# Patient Record
Sex: FEMALE
[De-identification: names, ages, dates, MRNs, and addresses within clinical notes are randomized per-mention and may not be internally consistent; named-entity substitution may affect disease eponyms.]

---

## 2018-05-30 ENCOUNTER — HOSPITAL ENCOUNTER (EMERGENCY)
Dept: HOSPITAL 97 - ER | Age: 23
Discharge: HOME | End: 2018-05-30
Payer: SELF-PAY

## 2018-05-30 VITALS — TEMPERATURE: 98.1 F

## 2018-05-30 VITALS — OXYGEN SATURATION: 98 % | SYSTOLIC BLOOD PRESSURE: 126 MMHG | DIASTOLIC BLOOD PRESSURE: 87 MMHG

## 2018-05-30 DIAGNOSIS — M62.838: ICD-10-CM

## 2018-05-30 DIAGNOSIS — N39.0: Primary | ICD-10-CM

## 2018-05-30 LAB
BUN BLD-MCNC: 15 MG/DL (ref 6–20)
GLUCOSE SERPLBLD-MCNC: 95 MG/DL (ref 65–120)
HCT VFR BLD CALC: 43.1 % (ref 36–45)
LYMPHOCYTES # SPEC AUTO: 1.4 K/UL (ref 0.7–4.9)
MCH RBC QN AUTO: 30.1 PG (ref 27–35)
MCV RBC: 89.2 FL (ref 80–100)
PMV BLD: 10.3 FL (ref 7.6–11.3)
POTASSIUM SERPL-SCNC: 3.8 MEQ/L (ref 3.6–5)
RBC # BLD: 4.83 M/UL (ref 3.86–4.86)

## 2018-05-30 PROCEDURE — 81025 URINE PREGNANCY TEST: CPT

## 2018-05-30 PROCEDURE — 96361 HYDRATE IV INFUSION ADD-ON: CPT

## 2018-05-30 PROCEDURE — 70450 CT HEAD/BRAIN W/O DYE: CPT

## 2018-05-30 PROCEDURE — 80048 BASIC METABOLIC PNL TOTAL CA: CPT

## 2018-05-30 PROCEDURE — 99284 EMERGENCY DEPT VISIT MOD MDM: CPT

## 2018-05-30 PROCEDURE — 81003 URINALYSIS AUTO W/O SCOPE: CPT

## 2018-05-30 PROCEDURE — 36415 COLL VENOUS BLD VENIPUNCTURE: CPT

## 2018-05-30 PROCEDURE — 96375 TX/PRO/DX INJ NEW DRUG ADDON: CPT

## 2018-05-30 PROCEDURE — 96374 THER/PROPH/DIAG INJ IV PUSH: CPT

## 2018-05-30 PROCEDURE — 72125 CT NECK SPINE W/O DYE: CPT

## 2018-05-30 PROCEDURE — 85025 COMPLETE CBC W/AUTO DIFF WBC: CPT
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
## 2018-08-20 ENCOUNTER — HOSPITAL ENCOUNTER (EMERGENCY)
Dept: HOSPITAL 97 - ER | Age: 23
Discharge: HOME | End: 2018-08-20
Payer: SELF-PAY

## 2018-08-20 VITALS — SYSTOLIC BLOOD PRESSURE: 112 MMHG | OXYGEN SATURATION: 98 % | DIASTOLIC BLOOD PRESSURE: 60 MMHG

## 2018-08-20 VITALS — TEMPERATURE: 97.8 F

## 2018-08-20 DIAGNOSIS — O23.11: Primary | ICD-10-CM

## 2018-08-20 LAB
ALBUMIN SERPL BCP-MCNC: 3.9 G/DL (ref 3.4–5)
ALP SERPL-CCNC: 83 U/L (ref 45–117)
ALT SERPL W P-5'-P-CCNC: 16 U/L (ref 12–78)
AST SERPL W P-5'-P-CCNC: 18 U/L (ref 15–37)
BUN BLD-MCNC: 10 MG/DL (ref 7–18)
GLUCOSE SERPLBLD-MCNC: 81 MG/DL (ref 74–106)
HCG SERPL-ACNC: 4613 MIU/ML (ref 1–3)
HCT VFR BLD CALC: 40.2 % (ref 36–45)
LIPASE SERPL-CCNC: 121 U/L (ref 73–393)
LYMPHOCYTES # SPEC AUTO: 1.6 K/UL (ref 0.7–4.9)
MCH RBC QN AUTO: 30.2 PG (ref 27–35)
MCV RBC: 90.7 FL (ref 80–100)
PMV BLD: 10.8 FL (ref 7.6–11.3)
POTASSIUM SERPL-SCNC: 3.7 MMOL/L (ref 3.5–5.1)
RBC # BLD: 4.43 M/UL (ref 3.86–4.86)

## 2018-08-20 PROCEDURE — 87077 CULTURE AEROBIC IDENTIFY: CPT

## 2018-08-20 PROCEDURE — 86901 BLOOD TYPING SEROLOGIC RH(D): CPT

## 2018-08-20 PROCEDURE — 83690 ASSAY OF LIPASE: CPT

## 2018-08-20 PROCEDURE — 76817 TRANSVAGINAL US OBSTETRIC: CPT

## 2018-08-20 PROCEDURE — 80076 HEPATIC FUNCTION PANEL: CPT

## 2018-08-20 PROCEDURE — 84702 CHORIONIC GONADOTROPIN TEST: CPT

## 2018-08-20 PROCEDURE — 86900 BLOOD TYPING SEROLOGIC ABO: CPT

## 2018-08-20 PROCEDURE — 80048 BASIC METABOLIC PNL TOTAL CA: CPT

## 2018-08-20 PROCEDURE — 85025 COMPLETE CBC W/AUTO DIFF WBC: CPT

## 2018-08-20 PROCEDURE — 36415 COLL VENOUS BLD VENIPUNCTURE: CPT

## 2018-08-20 PROCEDURE — 96360 HYDRATION IV INFUSION INIT: CPT

## 2018-08-20 PROCEDURE — 87186 SC STD MICRODIL/AGAR DIL: CPT

## 2018-08-20 PROCEDURE — 87086 URINE CULTURE/COLONY COUNT: CPT

## 2018-08-20 PROCEDURE — 99284 EMERGENCY DEPT VISIT MOD MDM: CPT

## 2018-08-20 PROCEDURE — 87088 URINE BACTERIA CULTURE: CPT

## 2018-08-20 NOTE — RAD REPORT
EXAM DESCRIPTION:  US - Transvaginal OB - 8/20/2018 1:41 pm

 

CLINICAL HISTORY:  ABD CRAMPING, PREGNANT

 

COMPARISON:  OB Complete dated 8/29/2016

 

FINDINGS:  A single gestational sac is seen within the uterus. The shape of the sac is within normal 
limits for gestational age. Based on mean sac diameter of 6 mm, estimated gestational age is 5 weeks 
1 day. No yolk sac or fetal pole is seen.

 

The placenta is not yet developed due to early gestational age.

 

A large right corpus luteal cyst is suspected measuring 5.3 x 5.2 cm. No evidence of ovarian torsion.
 Maternal adnexae are otherwise within normal limits. Normal Doppler blood flow was demonstrated to b
ot ovaries.

 

 

IMPRESSION:  Findings are compatible with early IUP. Embryonic components are not yet visualized, thu
s follow-up sonography would be recommended in 7-10 days.

## 2018-08-20 NOTE — ER
Nurse's Notes                                                                                     

 [facility]                                                                

Name: Temi Cartagena                                                                             

Age: 23 yrs                                                                                       

Sex: Female                                                                                       

: 1995                                                                                   

MRN: [id number]                                                                                   

Arrival Date: 2018                                                                          

Time: 12:29                                                                                       

Account#: [id number]                                                                            

Bed 8                                                                                             

Private MD: None, None                                                                            

Diagnosis: Pregnancy related conditions, unspecified, first trimester;Cystitis                    

                                                                                                  

Presentation:                                                                                     

                                                                                             

12:38 Presenting complaint: Patient states: PAIN TO EPIGASTRIC, SHER SIDES OF ABDOMEN, SHER     ch  

      "OVARIES" ALTERNATING, PINCHING TO "SIDE OF VAGINA" R SIDE.                                 

12:39 Transition of care: patient was not received from another setting of care. Onset of     ch  

      symptoms was 2018. Risk Assessment: Do you want to hurt yourself or someone      

      else? Patient reports no desire to harm self or others. Initial Sepsis Screen: Does the     

      patient meet any 2 criteria? No. Patient's initial sepsis screen is negative. Does the      

      patient have a suspected source of infection? No. Patient's initial sepsis screen is        

      negative. Care prior to arrival: None.                                                      

12:39 Method Of Arrival: Ambulatory                                                             

12:39 Acuity: CLAUDIO 3                                                                           ch  

                                                                                                  

Triage Assessment:                                                                                

12:40 General: Appears in no apparent distress. comfortable, Behavior is calm, cooperative,   ch  

      appropriate for age. Pain: Complains of pain in epigastric area, anterior aspect of         

      right lateral abdomen, anterior aspect of left lateral abdomen, right upper quadrant,       

      left upper quadrant and pelvis Pain currently is 0 out of 10 on a pain scale. at worst      

      was 8 out of 10 on a pain scale. Neuro: No deficits noted.                                  

                                                                                                  

OB/GYN:                                                                                           

12:40 LMP 2018                                                                            ch  

13:38  2, Full Term 1, Premature 0,  0, Living 1                               frederick 

                                                                                                  

Historical:                                                                                       

- Allergies:                                                                                      

12:40 No Known Allergies;                                                                     ch  

- Home Meds:                                                                                      

12:40 None [Active];                                                                          ch  

- PMHx:                                                                                           

12:40 Anxiety;                                                                                ch  

- PSHx:                                                                                           

12:40 None;                                                                                   ch  

                                                                                                  

- Immunization history:: Adult Immunizations up to date, Flu vaccine is not up to date.           

- Social history:: Smoking status: Patient/guardian denies using tobacco,                         

  Patient/guardian denies using alcohol, street drugs.                                            

- Ebola Screening: : Patient negative for fever greater than or equal to 101.5 degrees            

  Fahrenheit, and additional compatible Ebola Virus Disease symptoms Patient denies               

  exposure to infectious person Patient denies travel to an Ebola-affected area in the            

  21 days before illness onset No symptoms or risks identified at this time.                      

- Family history:: not pertinent.                                                                 

                                                                                                  

                                                                                                  

Screenin:06 Abuse screen: Denies threats or abuse. Nutritional screening: No deficits noted.        tw2 

      Tuberculosis screening: No symptoms or risk factors identified. Fall Risk None              

      identified.                                                                                 

                                                                                                  

Assessment:                                                                                       

13:04 Reassessment: pt instructed NPO at this time. General: Appears in no apparent distress. tw2 

      distressed, comfortable. Pain: Complains of pain in pelvis and left upper quadrant and      

      right upper quadrant and epigastric area. Neuro: Level of Consciousness is awake,           

      alert, obeys commands, Oriented to person, place, time, situation. Cardiovascular:          

      Denies chest pain, shortness of breath, Heart tones S1 S2 Patient's skin is warm and        

      dry. Respiratory: Airway is patent Respiratory effort is even, unlabored, Respiratory       

      pattern is regular, symmetrical. GI: Abdomen is flat, Bowel sounds present X 4 quads.       

      Reports lower abdominal pain, upper abdominal pain, nausea. : Reports white vaginal       

      discharge at times. EENT: No signs and/or symptoms were reported regarding the EENT         

      system. Derm: No signs and/or symptoms reported regarding the dermatologic system.          

      Musculoskeletal: Range of motion: intact in all extremities.                                

14:10 Reassessment: Patient appears in no apparent distress at this time. No changes from     tw2 

      previously documented assessment. Patient and/or family updated on plan of care and         

      expected duration. Pain level reassessed. Patient is alert, oriented x 3, equal             

      unlabored respirations, skin warm/dry/pink.                                                 

15:20 Reassessment: Patient appears in no apparent distress at this time. No changes from     tw2 

      previously documented assessment. Patient and/or family updated on plan of care and         

      expected duration. Pain level reassessed. Patient is alert, oriented x 3, equal             

      unlabored respirations, skin warm/dry/pink.                                                 

16:23 Reassessment: Patient appears in no apparent distress at this time. No changes from     tw2 

      previously documented assessment. Patient and/or family updated on plan of care and         

      expected duration. Pain level reassessed. Patient is alert, oriented x 3, equal             

      unlabored respirations, skin warm/dry/pink.                                                 

17:55 Reassessment: Patient appears in no apparent distress at this time. No changes from     tw2 

      previously documented assessment. Patient and/or family updated on plan of care and         

      expected duration. Pain level reassessed. Patient is alert, oriented x 3, equal             

      unlabored respirations, skin warm/dry/pink.                                                 

18:55 Reassessment: Patient appears in no apparent distress at this time. No changes from     tw2 

      previously documented assessment. Patient and/or family updated on plan of care and         

      expected duration. Pain level reassessed. Patient is alert, oriented x 3, equal             

      unlabored respirations, skin warm/dry/pink.                                                 

                                                                                                  

Vital Signs:                                                                                      

12:40  / 83; Pulse 72; Resp 18; Temp 97.8; Pulse Ox 99% on R/A; Weight 95.25 kg; Height ch  

      5 ft. 5 in. (165.10 cm); Pain 0/10;                                                         

14:23  / 53; Pulse 69; Resp 18; Pulse Ox 99% ;                                          sv  

15:20  / 60; Pulse 81; Resp 16; Pulse Ox 100% on R/A;                                   tw2 

16:22  / 68; Pulse 77; Resp 17; Pulse Ox 100% on R/A;                                   tw2 

17:00  / 64; Pulse 83; Resp 18; Pulse Ox 99% ;                                          sv  

18:40  / 60; Pulse 80; Resp 16; Pulse Ox 98% ;                                          sv  

12:40 Body Mass Index 34.95 (95.25 kg, 165.10 cm)                                               

                                                                                                  

ED Course:                                                                                        

12:29 Patient arrived in ED.                                                                  sb2 

12:30 None, None is Private Physician.                                                        sb2 

12:39 Triage completed.                                                                       ch  

12:40 Arm band placed on left wrist. Patient placed in waiting room.                          ch  

12:59 Rowan Arguello, RN is Primary Nurse.                                                        tw2 

13:06 Bed in low position. Call light in reach. Adult w/ patient. Pulse ox on. NIBP on.       tw2 

13:13 Mayito Coulter MD is Attending Physician.                                             frederick 

13:17 Radiology exam delayed due to lab results not completed at this time. (HCG).            lc3 

13:24 Inserted saline lock: 22 gauge in right antecubital area, using aseptic technique.      tw2 

      Blood collected.                                                                            

13:40 US Transvaginal Ob In Process Unspecified.                                              EDMS

16:52 Parth Archuleta MD is Referral Physician.                                           frederick 

17:11 Awaiting lab results, Awaiting: prior to discharge.                                     tw2 

17:45 Awaiting lab results, Awaiting: spoke with Julianne Kim in lab checking on results on     tw2 

      lipase and lfts, 20 minutes needed for additional results, provider notified.               

18:58 No provider procedures requiring assistance completed. IV discontinued, intact,         tw2 

      bleeding controlled, No redness/swelling at site. Pressure dressing applied.                

                                                                                                  

Administered Medications:                                                                         

13:42 Drug: NS 0.9% 1000 ml Route: IV; Rate: 1 bolus; Site: right antecubital;                tw2 

15:00 Follow up: Response: No adverse reaction; IV Status: Completed infusion; IV Intake:     tw2 

      1000ml                                                                                      

                                                                                                  

                                                                                                  

Intake:                                                                                           

15:00 IV: 1000ml; Total: 1000ml.                                                              tw2 

                                                                                                  

Outcome:                                                                                          

16:52 Discharge ordered by MD. mack 

18:58 Patient left the ED.                                                                    tw2 

18:58 Discharged to home ambulatory.                                                          tw2 

18:58 Condition: stable                                                                           

18:58 Discharge instructions given to patient, Instructed on discharge instructions, follow       

      up and referral plans. medication usage, Demonstrated understanding of instructions,        

      follow-up care, medications, Prescriptions given X 2.                                       

                                                                                                  

Addendum:                                                                                         

2018                                                                                        

     07:45 Addendum: Culture Results: Positive urine culture. No further action required. Bacteria i
w

           sensitive to prescribed antibiotic.                                                    

                                                                                                  

Signatures:                                                                                       

Dispatcher [employer]                           EDDot Gonzalez, RN                  RN   Zhane Taylor RN RN sv Anderson, Corey, MD MD cha Williams, Irene, RN RN iw Cunningham, Laulita lc3 Wise, Tara, RN                          RN   tw2                                                  

Lucille Haider                               sb2                                                  

                                                                                                  

**************************************************************************************************

## 2018-08-20 NOTE — EDPHYS
Physician Documentation                                                                           

 [facility]                                                                

Name: Temi Cratagena                                                                             

Age: 23 yrs                                                                                       

Sex: Female                                                                                       

: 1995                                                                                   

MRN: [id number]                                                                                   

Arrival Date: 2018                                                                          

Time: 12:29                                                                                       

Account#: [id number]                                                                            

Bed 8                                                                                             

Private MD: None, None                                                                            

ED Physician Mayito Coulter                                                                      

HPI:                                                                                              

                                                                                             

13:38 This 23 yrs old  Female presents to ER via Ambulatory with complaints of        frederick 

      PREG,ABD CRAMPING LMP 18.                                                             

13:38 The patient presents with abdominal pain in the upper abdomen, in the lower abdomen.    frederick 

      Onset: The symptoms/episode began/occurred 2 day(s) ago. The patient presents to the        

      emergency department with possible uterine contractions. The estimated gestational age      

      is 8 weeks. Associated signs and symptoms: The patient has no apparent associated signs     

      or symptoms.                                                                                

                                                                                                  

OB/GYN:                                                                                           

12:40 LMP 2018                                                                            ch  

13:38  2, Full Term 1, Premature 0,  0, Living 1                               frederick 

                                                                                                  

Historical:                                                                                       

- Allergies:                                                                                      

12:40 No Known Allergies;                                                                     ch  

- Home Meds:                                                                                      

12:40 None [Active];                                                                          ch  

- PMHx:                                                                                           

12:40 Anxiety;                                                                                ch  

- PSHx:                                                                                           

12:40 None;                                                                                   ch  

                                                                                                  

- Immunization history:: Adult Immunizations up to date, Flu vaccine is not up to date.           

- Social history:: Smoking status: Patient/guardian denies using tobacco,                         

  Patient/guardian denies using alcohol, street drugs.                                            

- Ebola Screening: : Patient negative for fever greater than or equal to 101.5 degrees            

  Fahrenheit, and additional compatible Ebola Virus Disease symptoms Patient denies               

  exposure to infectious person Patient denies travel to an Ebola-affected area in the            

  21 days before illness onset No symptoms or risks identified at this time.                      

- Family history:: not pertinent.                                                                 

                                                                                                  

                                                                                                  

ROS:                                                                                              

13:38 Constitutional: Negative for fever, chills, and weight loss, Eyes: Negative for injury, frederick 

      pain, redness, and discharge, ENT: Negative for injury, pain, and discharge, Neck:          

      Negative for injury, pain, and swelling, Cardiovascular: Negative for chest pain,           

      palpitations, and edema, Respiratory: Negative for shortness of breath, cough,              

      wheezing, and pleuritic chest pain, Abdomen/GI: Negative for abdominal pain, nausea,        

      vomiting, diarrhea, and constipation, Back: Negative for injury and pain, : Negative      

      for injury, bleeding, discharge, and swelling, MS/Extremity: Negative for injury and        

      deformity, Skin: Negative for injury, rash, and discoloration, Neuro: Negative for          

      headache, weakness, numbness, tingling, and seizure, Psych: Negative for depression,        

      anxiety, suicide ideation, homicidal ideation, and hallucinations, Allergy/Immunology:      

      Negative for hives, rash, and allergies, Endocrine: Negative for neck swelling,             

      polydipsia, polyuria, polyphagia, and marked weight changes, Hematologic/Lymphatic:         

      Negative for swollen nodes, abnormal bleeding, and unusual bruising.                        

                                                                                                  

Exam:                                                                                             

13:38 Constitutional:  This is a well developed, well nourished patient who is awake, alert,  frederick 

      and in no acute distress. Head/Face:  Normocephalic, atraumatic. Eyes:  Pupils equal        

      round and reactive to light, extra-ocular motions intact.  Lids and lashes normal.          

      Conjunctiva and sclera are non-icteric and not injected.  Cornea within normal limits.      

      Periorbital areas with no swelling, redness, or edema. ENT:  Nares patent. No nasal         

      discharge, no septal abnormalities noted.  Tympanic membranes are normal and external       

      auditory canals are clear.  Oropharynx with no redness, swelling, or masses, exudates,      

      or evidence of obstruction, uvula midline.  Mucous membranes moist. Neck:  Trachea          

      midline, no thyromegaly or masses palpated, and no cervical lymphadenopathy.  Supple,       

      full range of motion without nuchal rigidity, or vertebral point tenderness.  No            

      Meningismus. Chest/axilla:  Normal chest wall appearance and motion.  Nontender with no     

      deformity.  No lesions are appreciated. Cardiovascular:  Regular rate and rhythm with a     

      normal S1 and S2.  No gallops, murmurs, or rubs.  Normal PMI, no JVD.  No pulse             

      deficits. Respiratory:  Lungs have equal breath sounds bilaterally, clear to                

      auscultation and percussion.  No rales, rhonchi or wheezes noted.  No increased work of     

      breathing, no retractions or nasal flaring. Abdomen/GI:  Soft, non-tender, with normal      

      bowel sounds.  No distension or tympany.  No guarding or rebound.  No evidence of           

      tenderness throughout. Back:  No spinal tenderness.  No costovertebral tenderness.          

      Full range of motion. Skin:  Warm, dry with normal turgor.  Normal color with no            

      rashes, no lesions, and no evidence of cellulitis. MS/ Extremity:  Pulses equal, no         

      cyanosis.  Neurovascular intact.  Full, normal range of motion. Neuro:  Awake and           

      alert, GCS 15, oriented to person, place, time, and situation.  Cranial nerves II-XII       

      grossly intact.  Motor strength 5/5 in all extremities.  Sensory grossly intact.            

      Cerebellar exam normal.  Normal gait. Psych:  Awake, alert, with orientation to person,     

      place and time.  Behavior, mood, and affect are within normal limits.                       

                                                                                                  

Vital Signs:                                                                                      

12:40  / 83; Pulse 72; Resp 18; Temp 97.8; Pulse Ox 99% on R/A; Weight 95.25 kg; Height ch  

      5 ft. 5 in. (165.10 cm); Pain 0/10;                                                         

14:23  / 53; Pulse 69; Resp 18; Pulse Ox 99% ;                                          sv  

15:20  / 60; Pulse 81; Resp 16; Pulse Ox 100% on R/A;                                   tw2 

16:22  / 68; Pulse 77; Resp 17; Pulse Ox 100% on R/A;                                   tw2 

17:00  / 64; Pulse 83; Resp 18; Pulse Ox 99% ;                                          sv  

18:40  / 60; Pulse 80; Resp 16; Pulse Ox 98% ;                                          sv  

12:40 Body Mass Index 34.95 (95.25 kg, 165.10 cm)                                               

                                                                                                  

MDM:                                                                                              

13:13 Patient medically screened.                                                             [facility] 

13:39 Data reviewed: vital signs, nurses notes, lab test result(s), radiologic studies,       frederick 

      ultrasound.                                                                                 

                                                                                                  

                                                                                             

13:14 Order name: Quantitative Hcg; Complete Time: 18:39                                      [facility] 

                                                                                             

13:14 Order name: Abo/rh Typing; Complete Time: 14:53                                         [facility] 

                                                                                             

13:14 Order name: Basic Metabolic Panel; Complete Time: 18:39                                 [facility] 

                                                                                             

13:14 Order name: CBC with Diff; Complete Time: 14:53                                         [facility] 

                                                                                             

13:14 Order name: Urine Culture                                                               [facility] 

                                                                                             

13:37 Order name: LFT's; Complete Time: 18:39                                                 [facility] 

                                                                                             

12:41 Order name: Urine Dipstick-Ancillary (obtain specimen); Complete Time: 13:00              

                                                                                             

12:41 Order name: Urine Pregnancy Test (obtain specimen); Complete Time: 13:00                  

                                                                                             

13:14 Order name: IV Saline Lock; Complete Time: 13:27                                        [facility] 

                                                                                             

13:14 Order name: Labs collected and sent; Complete Time: 13:28                               [facility] 

                                                                                             

13:14 Order name: US Transvaginal Ob; Complete Time: 15:15                                    [facility] 

                                                                                             

13:37 Order name: Lipase; Complete Time: 18:39                                                [facility] 

                                                                                             

16:09 Order name: ABO/RH no charge; Complete Time: 16:22                                      [facility]

                                                                                             

13:14 Order name: NPO; Complete Time: 13:28                                                   [facility] 

                                                                                                  

Administered Medications:                                                                         

13:42 Drug: NS 0.9% 1000 ml Route: IV; Rate: 1 bolus; Site: right antecubital;                tw2 

15:00 Follow up: Response: No adverse reaction; IV Status: Completed infusion; IV Intake:     tw2 

      1000ml                                                                                      

                                                                                                  

                                                                                                  

Disposition:                                                                                      

18 16:52 Discharged to Home. Impression: Pregnancy related conditions, unspecified,         

  first trimester, Cystitis.                                                                      

- Condition is Stable.                                                                            

- Discharge Instructions: Dysuria, First Trimester of Pregnancy, Easy-to-Read, First              

  Trimester of Pregnancy, Pelvic Rest.                                                            

- Prescriptions for Prenatal Vitamin 27- 0.8 mg Oral Tablet - take 1 tablet by ORAL               

  route once daily; 30 tablet. Macrobid 100 mg Oral Capsule - take 1 capsule by ORAL              

  route every 12 hours for 7 days; 14 capsule.                                                    

- Medication Reconciliation Form, Thank You Letter, Antibiotic Education, Prescription            

  Opioid Use, Work release form form.                                                             

- Follow up: Private Physician; When: 2 - 3 days; Reason: Recheck today's complaints,             

  Continuance of care, Re-evaluation by your physician. Follow up: Parth Archuleta;             

  When: 2 - 3 days; Reason: Recheck today's complaints, Re-evaluation by your physician.          

- Problem is new.                                                                                 

- Symptoms have improved.                                                                         

                                                                                                  

                                                                                                  

                                                                                                  

Signatures:                                                                                       

Dispatcher [employer]                           EDDot Gonzalez RN RN ch Anderson, Corey, MD MD cha Wise, Tara RN                          RN   tw2                                                  

                                                                                                  

Corrections: (The following items were deleted from the chart)                                    

18:58 16:52 2018 16:52 Discharged to Home. Impression: Pregnancy related conditions,    tw2 

      unspecified, first trimester; Cystitis. Condition is Stable. Discharge Instructions:        

      Dysuria, First Trimester of Pregnancy, Easy-to-Read, First Trimester of Pregnancy,          

      Pelvic Rest. Prescriptions for Prenatal Vitamin 27-0.8 mg Oral Tablet - take 1 tablet       

      by ORAL route once daily; 30 tablet, Macrobid 100 mg Oral Capsule - take 1 capsule by       

      ORAL route every 12 hours for 7 days; 14 capsule. and Forms are Medication                  

      Reconciliation Form, Thank You Letter, Antibiotic Education, Prescription Opioid Use.       

      Follow up: Private Physician; When: 2 - 3 days; Reason: Recheck today's complaints,         

      Continuance of care, Re-evaluation by your physician. Follow up: Parth Archuleta;         

      When: 2 - 3 days; Reason: Recheck today's complaints, Re-evaluation by your physician.      

      Problem is new. Symptoms have improved. frederick                                                 

                                                                                                  

**************************************************************************************************

## 2019-08-03 ENCOUNTER — HOSPITAL ENCOUNTER (EMERGENCY)
Dept: HOSPITAL 97 - ER | Age: 24
Discharge: HOME | End: 2019-08-03
Payer: COMMERCIAL

## 2019-08-03 VITALS — OXYGEN SATURATION: 99 %

## 2019-08-03 VITALS — DIASTOLIC BLOOD PRESSURE: 68 MMHG | SYSTOLIC BLOOD PRESSURE: 112 MMHG

## 2019-08-03 VITALS — TEMPERATURE: 98.4 F

## 2019-08-03 DIAGNOSIS — N73.9: Primary | ICD-10-CM

## 2019-08-03 LAB
ALBUMIN SERPL BCP-MCNC: 3.8 G/DL (ref 3.4–5)
ALP SERPL-CCNC: 94 U/L (ref 45–117)
ALT SERPL W P-5'-P-CCNC: 18 U/L (ref 12–78)
AST SERPL W P-5'-P-CCNC: 14 U/L (ref 15–37)
BUN BLD-MCNC: 13 MG/DL (ref 7–18)
GLUCOSE SERPLBLD-MCNC: 83 MG/DL (ref 74–106)
HCT VFR BLD CALC: 35 % (ref 36–45)
LIPASE SERPL-CCNC: 151 U/L (ref 73–393)
LYMPHOCYTES # SPEC AUTO: 1.9 K/UL (ref 0.7–4.9)
PMV BLD: 11.4 FL (ref 7.6–11.3)
POTASSIUM SERPL-SCNC: 4.2 MMOL/L (ref 3.5–5.1)
RBC # BLD: 4.29 M/UL (ref 3.86–4.86)

## 2019-08-03 PROCEDURE — 80048 BASIC METABOLIC PNL TOTAL CA: CPT

## 2019-08-03 PROCEDURE — 96375 TX/PRO/DX INJ NEW DRUG ADDON: CPT

## 2019-08-03 PROCEDURE — 87490 CHLMYD TRACH DNA DIR PROBE: CPT

## 2019-08-03 PROCEDURE — 81025 URINE PREGNANCY TEST: CPT

## 2019-08-03 PROCEDURE — 36415 COLL VENOUS BLD VENIPUNCTURE: CPT

## 2019-08-03 PROCEDURE — 96365 THER/PROPH/DIAG IV INF INIT: CPT

## 2019-08-03 PROCEDURE — 76830 TRANSVAGINAL US NON-OB: CPT

## 2019-08-03 PROCEDURE — 96361 HYDRATE IV INFUSION ADD-ON: CPT

## 2019-08-03 PROCEDURE — 99284 EMERGENCY DEPT VISIT MOD MDM: CPT

## 2019-08-03 PROCEDURE — 80076 HEPATIC FUNCTION PANEL: CPT

## 2019-08-03 PROCEDURE — 87590 N.GONORRHOEAE DNA DIR PROB: CPT

## 2019-08-03 PROCEDURE — 87210 SMEAR WET MOUNT SALINE/INK: CPT

## 2019-08-03 PROCEDURE — 85025 COMPLETE CBC W/AUTO DIFF WBC: CPT

## 2019-08-03 PROCEDURE — 81003 URINALYSIS AUTO W/O SCOPE: CPT

## 2019-08-03 PROCEDURE — 83690 ASSAY OF LIPASE: CPT

## 2019-08-03 NOTE — ER
Nurse's Notes                                                                                     

 [facility]                                                                 

Name: Temi Cartagena                                                                             

Age: 24 yrs                                                                                       

Sex: Female                                                                                       

: 1995                                                                                   

MRN: [id number]                                                                                   

Arrival Date: 2019                                                                          

Time: 13:23                                                                                       

Account#: [id number]                                                                            

Bed 20                                                                                            

Private MD:                                                                                       

Diagnosis: Female pelvic inflammatory disease, unspecified                                        

                                                                                                  

Presentation:                                                                                     

                                                                                             

13:25 Presenting complaint: Patient states: im having bad pain on my bladder area,            hj  

      (suprapubic area), it doesn't burn when i pee, its been on and off for a couple of          

      weeks now; denies fever and chills; reports nausea;. Transition of care: patient was        

      not received from another setting of care. Onset of symptoms was 2019. Risk      

      Assessment: Do you want to hurt yourself or someone else? Patient reports no desire to      

      harm self or others. Initial Sepsis Screen: Does the patient meet any 2 criteria? No.       

      Patient's initial sepsis screen is negative. Does the patient have a suspected source       

      of infection? No. Patient's initial sepsis screen is negative. Care prior to arrival:       

      None.                                                                                       

13:25 Method Of Arrival: Ambulatory                                                             

13:25 Acuity: CLAUDIO 3                                                                           hj  

                                                                                                  

OB/GYN:                                                                                           

13:27 LMP 2019                                                                             

                                                                                                  

Historical:                                                                                       

- Allergies:                                                                                      

13:27 No Known Allergies;                                                                       

- PMHx:                                                                                           

13:27 Anxiety;                                                                                  

- PSHx:                                                                                           

13:27 None;                                                                                     

                                                                                                  

                                                                                                  

                                                                                                  

Screenin:41 Abuse screen: Denies threats or abuse. Nutritional screening: No deficits noted.        em  

      Tuberculosis screening: No symptoms or risk factors identified. Fall Risk None              

      identified.                                                                                 

                                                                                                  

Assessment:                                                                                       

13:44 General: Appears in no apparent distress. uncomfortable, Behavior is calm, cooperative, em  

      Denies fever. Pain: Complains of pain in suprapubic area Pain currently is 8 out of 10      

      on a pain scale. Neuro: Level of Consciousness is awake, alert, obeys commands,             

      Oriented to person, place, time, situation. Cardiovascular: Capillary refill < 3            

      seconds Patient's skin is warm and dry. Respiratory: Airway is patent Respiratory           

      effort is even, unlabored, Respiratory pattern is regular, symmetrical. : Reports         

      discharge, Denies burning with urination, vaginal bleeding. Derm: Skin is intact, is        

      healthy with good turgor, Skin is pink, warm \T\ dry. Musculoskeletal: Capillary refill <   

      3 seconds, Range of motion: intact in all extremities.                                      

13:44 GI: Abdomen is flat, Patient currently denies nausea, vomiting.                         em  

14:41 Reassessment: Patient appears in no apparent distress at this time. Patient and/or      em  

      family updated on plan of care and expected duration. Pain level reassessed. Patient is     

      alert, oriented x 3, equal unlabored respirations, skin warm/dry/pink. rates pain 4/10      

      Patient states feeling better. Patient states symptoms have improved.                       

16:33 Reassessment: Patient appears in no apparent distress at this time. Patient and/or      em  

      family updated on plan of care and expected duration. Pain level reassessed. Patient is     

      alert, oriented x 3, equal unlabored respirations, skin warm/dry/pink. Patient denies       

      pain at this time. Patient states feeling better.                                           

                                                                                                  

Vital Signs:                                                                                      

13:27  / 68; Pulse 62; Resp 18; Temp 98.4(O); Pulse Ox 100% on R/A; Weight 86.18 kg;    hj  

      Height 5 ft. 5 in. (165.10 cm); Pain 8/10;                                                  

14:41  / 80; Pulse 71; Resp 18; Pulse Ox 99% on R/A; Pain 4/10;                         em  

16:34  / 68; Pulse 81; Resp 16; Pulse Ox 99% on R/A; Pain 0/10;                         em  

13:27 Body Mass Index 31.62 (86.18 kg, 165.10 cm)                                               

                                                                                                  

ED Course:                                                                                        

13:23 Patient arrived in ED.                                                                  mr  

13:27 Triage completed.                                                                       hj  

13:27 Arm band placed on left wrist.                                                          hj  

13:33 Hernesto Saravia LVN is Primary Nurse.                                                     em  

13:35 Mayito Pena PA is PHCP.                                                                cp  

13:35 Kevin Leo MD is Attending Physician.                                                cp  

13:43 Patient has correct armband on for positive identification. Placed in gown. Bed in low  em  

      position. Call light in reach. Pulse ox on. NIBP on.                                        

14:30 Assist provider with pelvic exam: Set up pelvic tray. Performed by Mayito JACKSON        iw  

      Specimens sent to lab. Patient tolerated well.                                              

16:05 Ultrasound completed. Patient tolerated well. Notified NP/PA page.                      sg3 

16:05 US Transvaginal Study (Probe) In Process Unspecified.                                   EDMS

16:48 IV discontinued, intact, bleeding controlled, No redness/swelling at site. Pressure     em  

      dressing applied.                                                                           

                                                                                                  

Administered Medications:                                                                         

13:58 CANCELLED (Physician Discretion): morphine 2 mg IVP once                                cp  

14:10 Drug: NS 0.9% 1000 ml Route: IV; Rate: 1 bolus; Site: right antecubital;                em  

16:35 Follow up: IV Status: Completed infusion; IV Intake: 1000ml                             em  

14:10 Drug: TORadol 30 mg Route: IVP; Site: right antecubital;                                iw  

15:13 Follow up: Response: No adverse reaction; Pain is decreased                             em  

15:24 Not Given (Patient Refused): Zofran 4 mg IVP once; over 2 minutes                       em  

15:29 Drug: Rocephin - (cefTRIAXone) 1 grams Route: IVPB; Infused Over: 30 mins; Site: right  iw  

      antecubital;                                                                                

16:35 Follow up: Response: No adverse reaction; IV Status: Completed infusion; IV Intake: 10mlem  

                                                                                                  

                                                                                                  

Intake:                                                                                           

16:35 IV: 10ml; Total: 10ml.                                                                  em  

16:35 IV: 1000ml; Total: 1010ml.                                                              em  

                                                                                                  

Outcome:                                                                                          

16:19 Discharge ordered by MD.                                                                cp  

16:48 Discharged to home ambulatory.                                                          em  

16:48 Condition: good                                                                             

16:48 Discharge instructions given to patient, Instructed on discharge instructions, follow       

      up and referral plans. medication usage, Demonstrated understanding of instructions,        

      follow-up care, medications, Prescriptions given X 5                                        

16:49 Patient left the ED.                                                                    em  

                                                                                                  

Signatures:                                                                                       

Dispatcher [employer]                           EDMS                                                 

Myra Ortiz, Hernesto, LVN                       LVN  em                                                   

Gabi Calderon RN RN   iw                                                   

Dane Alex RN RN hj Page, Corey, PA PA cp Godinez, Sarah                               sg3                                                  

                                                                                                  

Corrections: (The following items were deleted from the chart)                                    

13:30 13:27 Pulse 62bpm; Resp 18bpm; Pulse Ox 100% RA; Temp 98.4F Oral; 86.18 kg; Height 5    hj  

      ft. 5 in.; BMI: 31.6; Pain 8/10; hj                                                         

                                                                                                  

**************************************************************************************************

## 2019-08-03 NOTE — RAD REPORT
EXAM DESCRIPTION:  US - Transvaginal Study Probe - 8/3/2019 4:07 pm

 

CLINICAL HISTORY:  Pelvic pain

 

COMPARISON:  none

 

FINDINGS:  The uterus measures 7 x 3 x 3cm. A fibroid is not seen. Endometrial stripe measures 14 mil
limeters.

 

The ovaries are normal in size and echotexture. The right and left adnexa unremarkable

 

No significant free fluid is seen.

 

IMPRESSION:  No significant abnormality is displayed

## 2019-08-03 NOTE — EDPHYS
Physician Documentation                                                                           

 [facility]                                                                 

Name: Temi Cartagena                                                                             

Age: 24 yrs                                                                                       

Sex: Female                                                                                       

: 1995                                                                                   

MRN: [id number]                                                                                   

Arrival Date: 2019                                                                          

Time: 13:23                                                                                       

Account#: [id number]                                                                            

Bed 20                                                                                            

Private MD:                                                                                       

ED Physician Kevin Leo                                                                         

HPI:                                                                                              

                                                                                             

13:50 This 24 yrs old  Female presents to ER via Ambulatory with complaints of        cp  

      Vaginal Pain.                                                                               

13:50 The patient presents with pelvic pain, vaginal discharge. Onset: The symptoms/episode   cp  

      began/occurred 2 week(s) ago. Associated signs and symptoms: Pertinent positives:           

      nausea, Pertinent negatives: constipation, diarrhea, dysuria, fever, vaginal bleeding.      

      Severity of symptoms: in the emergency department the symptoms are unchanged, despite       

      home interventions.                                                                         

                                                                                                  

OB/GYN:                                                                                           

13:27 LMP 2019                                                                             

                                                                                                  

Historical:                                                                                       

- Allergies:                                                                                      

13:27 No Known Allergies;                                                                     hj  

- PMHx:                                                                                           

13:27 Anxiety;                                                                                hj  

- PSHx:                                                                                           

13:27 None;                                                                                   hj  

                                                                                                  

                                                                                                  

                                                                                                  

ROS:                                                                                              

14:00 Constitutional: Negative for body aches, chills, fever, poor PO intake.                 cp  

14:00 Eyes: Negative for injury, pain, redness, and discharge.                                cp  

14:00 ENT: Negative for drainage from ear(s), ear pain, sore throat, difficulty swallowing,       

      difficulty handling secretions.                                                             

14:00 Cardiovascular: Negative for chest pain, palpitations.                                      

14:00 Respiratory: Negative for cough, shortness of breath, wheezing.                             

14:00 Abdomen/GI: Negative for vomiting, diarrhea, constipation, black/tarry stool, rectal        

      bleeding.                                                                                   

14:00 : Positive for pelvic pain, vaginal discharge, Negative for urinary symptoms, flank       

      pain, vaginal bleeding.                                                                     

14:00 Skin: Negative for rash.                                                                    

14:00 All other systems are negative.                                                             

                                                                                                  

Exam:                                                                                             

14:50 Head/Face:  Normocephalic, atraumatic.                                                  cp  

14:50 Constitutional: The patient appears in no acute distress, alert, awake, non-toxic, well     

      developed, well nourished, uncomfortable.                                                   

14:50 Eyes: Periorbital structures: appear normal, Conjunctiva: normal, no exudate, no            

      injection, Sclera: no appreciated abnormality, Lids and lashes: appear normal,              

      bilaterally.                                                                                

14:50 ENT: External ear(s): are unremarkable, Nose: is normal, Mouth: Lips: moist, Oral           

      mucosa: moist, Posterior pharynx: is normal, airway is patent, no erythema, no exudate.     

14:50 Chest/axilla: Inspection: normal, Palpation: is normal, no crepitus, no tenderness.         

14:50 Cardiovascular: Rate: normal, Rhythm: regular.                                              

14:50 Respiratory: the patient does not display signs of respiratory distress,  Respirations:     

      normal, no use of accessory muscles, no retractions, no splinting, no tachypnea,            

      labored breathing, is not present, Breath sounds: are clear throughout, no decreased        

      breath sounds, no stridor, no wheezing.                                                     

14:50 Abdomen/GI: Inspection: abdomen appears normal, Bowel sounds: active, all quadrants,        

      Palpation: soft, in all quadrants, moderate abdominal tenderness, in the suprapubic         

      area, rebound tenderness, is not appreciated, voluntary guarding, is elicited in the        

      suprapubic area.                                                                            

14:50 : Pelvic Exam: External exam: is normal, Speculum exam: no bleeding is noted, os that     

      is closed, bimanual exam reveals cervical motion tenderness, uterine tenderness, right      

      adnexal tenderness, left adnexal tenderness, no adnexal mass on right, no adnexal mass      

      on left, discharge, white, yellow, the nurse was present for the exam.                      

14:50 Skin: no rash present.                                                                      

                                                                                                  

Vital Signs:                                                                                      

13:27  / 68; Pulse 62; Resp 18; Temp 98.4(O); Pulse Ox 100% on R/A; Weight 86.18 kg;    hj  

      Height 5 ft. 5 in. (165.10 cm); Pain 8/10;                                                  

14:41  / 80; Pulse 71; Resp 18; Pulse Ox 99% on R/A; Pain 4/10;                         em  

16:34  / 68; Pulse 81; Resp 16; Pulse Ox 99% on R/A; Pain 0/10;                         em  

13:27 Body Mass Index 31.62 (86.18 kg, 165.10 cm)                                               

                                                                                                  

MDM:                                                                                              

13:41 Patient medically screened.                                                             cp  

14:00 Differential diagnosis: cervicitis, ectopic pregnancy, nonspecific abdominal pain,      cp  

      ovarian cyst, pelvic inflammatory disease, urinary tract infection, vaginosis.              

16:15 Data reviewed: vital signs, nurses notes, lab test result(s), radiologic studies,       cp  

      ultrasound.                                                                                 

16:15 Counseling: I had a detailed discussion with the patient and/or guardian regarding: the cp  

      historical points, exam findings, and any diagnostic results supporting the                 

      discharge/admit diagnosis, lab results, radiology results, the need for outpatient          

      follow up, an OB/Gyne specialist, to return to the emergency department if symptoms         

      worsen or persist or if there are any questions or concerns that arise at home.             

      Response to treatment: the patient's symptoms have markedly improved after treatment,       

      and as a result, I will discharge patient. Special discussion: Based on the patient's       

      Hx, exam, and Dx evaluation, there is no indication for emergent surgery or inpatient       

      Tx. It is understood by the patient/guardian that if the Sx's persist or worsen they        

      need to return immediately for re-evaluation.                                               

                                                                                                  

                                                                                             

13:47 Order name: Urine Dipstick--Ancillary (enter results); Complete Time: 14:30             eb  

                                                                                             

13:47 Order name: Urine Pregnancy--Ancillary (enter results); Complete Time: 14:30            eb  

                                                                                             

13:56 Order name: Basic Metabolic Panel; Complete Time: 14:48                                 cp  

                                                                                             

13:56 Order name: CBC with Diff; Complete Time: 14:30                                         cp  

                                                                                             

14:31 Interpretation: Normal except: HGB 11.2; HCT 35.0; MCV 81.5; MCH 26.1; RDW 17.6; MPV    cp  

      11.4.                                                                                       

                                                                                             

13:56 Order name: Creatinine for Radiology; Complete Time: 14:48                              cp  

                                                                                             

13:56 Order name: Hepatic Function; Complete Time: 14:48                                      cp  

                                                                                             

13:56 Order name: Lipase; Complete Time: 14:48                                                cp  

                                                                                             

13:58 Order name: GC (GONORR/CHLAMYDIA) Probe                                                 cp  

                                                                                             

13:58 Order name: Wet Prep; Complete Time: 15:11                                              cp  

                                                                                             

15:11 Interpretation: Reviewed.                                                               cp  

                                                                                             

14:49 Order name: US Transvaginal Study (Probe)                                               cp  

                                                                                             

13:41 Order name: Urine Dipstick-Ancillary (obtain specimen); Complete Time: 13:48            cp  

                                                                                             

13:41 Order name: Urine Pregnancy Test (obtain specimen); Complete Time: 13:48                cp  

                                                                                             

13:56 Order name: IV Saline Lock; Complete Time: 14:43                                        cp  

                                                                                             

13:56 Order name: Labs collected and sent; Complete Time: 14:43                               cp  

                                                                                             

13:58 Order name: Pelvic Exam Setup; Complete Time: 15:03                                     cp  

                                                                                                  

Administered Medications:                                                                         

13:58 CANCELLED (Physician Discretion): morphine 2 mg IVP once                                cp  

14:10 Drug: NS 0.9% 1000 ml Route: IV; Rate: 1 bolus; Site: right antecubital;                em  

16:35 Follow up: IV Status: Completed infusion; IV Intake: 1000ml                             em  

14:10 Drug: TORadol 30 mg Route: IVP; Site: right antecubital;                                iw  

15:13 Follow up: Response: No adverse reaction; Pain is decreased                             em  

15:24 Not Given (Patient Refused): Zofran 4 mg IVP once; over 2 minutes                       em  

15:29 Drug: Rocephin - (cefTRIAXone) 1 grams Route: IVPB; Infused Over: 30 mins; Site: right  iw  

      antecubital;                                                                                

16:35 Follow up: Response: No adverse reaction; IV Status: Completed infusion; IV Intake: 10mlem  

                                                                                                  

                                                                                                  

Disposition:                                                                                      

17:26 Co-signature as Attending Physician, Kevin Leo MD.                                    rn  

                                                                                                  

Disposition:                                                                                      

19 16:19 Discharged to Home. Impression: Female pelvic inflammatory disease, unspecified.   

- Condition is Stable.                                                                            

- Discharge Instructions: Pelvic Inflammatory Disease, Pelvic Pain, Female.                       

- Prescriptions for Zofran 4 mg Oral Tablet - take 1 tablet by ORAL route every 12                

  hours As needed; 20 tablet. Doxycycline Hyclate 100 mg Oral Tablet - take 1 tablet by           

  ORAL route every 12 hours for 14 days; 28 tablet. Metronidazole 500 mg Oral Tablet -            

  take 1 tablet by ORAL route every 8 hours for 14 days; 42 tablet. Tramadol 50 mg Oral           

  Tablet - take 1 tablet by ORAL route every 8 hours as needed; 15 tablet. Ibuprofen              

  800 mg Oral Tablet - take 1 tablet by ORAL route every 8 hours As needed take with              

  food; 30 tablet.                                                                                

- Medication Reconciliation Form, Thank You Letter, Antibiotic Education, Prescription            

  Opioid Use form.                                                                                

- Follow up: Private Physician; When: 1 week; Reason: Recheck today's complaints.                 

- Problem is new.                                                                                 

- Symptoms have improved.                                                                         

                                                                                                  

                                                                                                  

                                                                                                  

Signatures:                                                                                       

Dispatcher [employer]                           Hernesto Marques, LVN                       LVN  em                                                   

Gabi Calderon RN RN iw Nieto, Roman, MD MD rn Joaquin, Henry, RN RN hj Page, Corey PA                         PA   cp                                                   

                                                                                                  

Corrections: (The following items were deleted from the chart)                                    

13:58 13:58 morphine 2 mg IVP once ordered. cp                                                cp  

16:49 16:19 2019 16:19 Discharged to Home. Impression: Female pelvic inflammatory       em  

      disease, unspecified. Condition is Stable. Forms are Medication Reconciliation Form,        

      Thank You Letter, Antibiotic Education, Prescription Opioid Use. Follow up: Private         

      Physician; When: 1 week; Reason: Recheck today's complaints. Problem is new. Symptoms       

      have improved. cp                                                                           

                                                                                                  

**************************************************************************************************

## 2019-08-03 NOTE — XMS REPORT
Patient Summary Document

 Created on:August 3, 2019



Patient:FELIPE CLARKE

Sex:Female

:1995

External Reference #:[id number]





Demographics







 Address  [address]



   [place] [address]

 

 Home Phone  [phone]

 

 Email Address  NONE

 

 Preferred Language  Unknown

 

 Marital Status  Unknown

 

 Mu-ism Affiliation  Unknown

 

 Race  Unknown

 

 Additional Race(s)  Unavailable

 

 Ethnic Group  Unknown









Author







 Organization  [facility]

 

 Address  [address] [place] [address]

 

 Phone  [phone]









Care Team Providers







 Name  Role  Phone

 

 Unavailable  Unavailable  Unavailable









Problems

This patient has no known problems.



Allergies, Adverse Reactions, Alerts

This patient has no known allergies or adverse reactions.



Medications

This patient has no known medications.

## 2019-11-18 ENCOUNTER — HOSPITAL ENCOUNTER (EMERGENCY)
Dept: HOSPITAL 97 - ER | Age: 24
Discharge: HOME | End: 2019-11-18
Payer: SELF-PAY

## 2019-11-18 VITALS — SYSTOLIC BLOOD PRESSURE: 124 MMHG | DIASTOLIC BLOOD PRESSURE: 76 MMHG

## 2019-11-18 VITALS — OXYGEN SATURATION: 100 % | TEMPERATURE: 98.2 F

## 2019-11-18 DIAGNOSIS — R53.1: ICD-10-CM

## 2019-11-18 DIAGNOSIS — F17.210: ICD-10-CM

## 2019-11-18 DIAGNOSIS — R53.83: ICD-10-CM

## 2019-11-18 DIAGNOSIS — N39.0: Primary | ICD-10-CM

## 2019-11-18 DIAGNOSIS — R53.81: ICD-10-CM

## 2019-11-18 LAB — UA COMPLETE W REFLEX CULTURE PNL UR: (no result)

## 2019-11-18 PROCEDURE — 81015 MICROSCOPIC EXAM OF URINE: CPT

## 2019-11-18 PROCEDURE — 99284 EMERGENCY DEPT VISIT MOD MDM: CPT

## 2019-11-18 PROCEDURE — 87088 URINE BACTERIA CULTURE: CPT

## 2019-11-18 PROCEDURE — 87086 URINE CULTURE/COLONY COUNT: CPT

## 2019-11-18 PROCEDURE — 96372 THER/PROPH/DIAG INJ SC/IM: CPT

## 2019-11-18 PROCEDURE — 81025 URINE PREGNANCY TEST: CPT

## 2019-11-18 PROCEDURE — 81003 URINALYSIS AUTO W/O SCOPE: CPT

## 2019-11-18 NOTE — EDPHYS
Physician Documentation                                                                           

 [facility]                                                                 

Name: Temi Cartagena                                                                             

Age: 24 yrs                                                                                       

Sex: Female                                                                                       

: 1995                                                                                   

MRN: [id number]                                                                                   

Arrival Date: 2019                                                                          

Time: 12:41                                                                                       

Account#: [id number]                                                                            

Bed 18                                                                                            

Private MD:                                                                                       

ED Physician Mayito Coulter                                                                      

HPI:                                                                                              

                                                                                             

13:16 This 24 yrs old  Female presents to ER via Ambulatory with complaints of Flu    frederick 

      Symptoms, Diarrhea, Abdominal Pain.                                                         

13:16 The patient presents to the emergency department with nausea, diarrhea, that is         frederick 

      intermittent. Onset: The symptoms/episode began/occurred 2 day(s) ago. Possible causes:     

      unknown. The symptoms are aggravated by nothing. The symptoms are alleviated by             

      nothing. Associated signs and symptoms: Pertinent positives: abdominal pain, diarrhea,      

      dysuria, fever, nausea. Severity of symptoms: At their worst the symptoms were mild in      

      the emergency department the symptoms are unchanged. The patient has not experienced        

      similar symptoms in the past.                                                               

                                                                                                  

OB/GYN:                                                                                           

12:49 LMP 10/21/2019                                                                          hb  

                                                                                                  

Historical:                                                                                       

- Allergies:                                                                                      

12:50 No Known Allergies;                                                                     hb  

- PMHx:                                                                                           

12:50 Anxiety;                                                                                hb  

- PSHx:                                                                                           

12:50 None;                                                                                   hb  

                                                                                                  

- Immunization history:: Adult Immunizations up to date.                                          

- Social history:: Smoking status: Patient uses tobacco products, smokes one pack                 

  cigarettes per day.                                                                             

- Ebola Screening: : No symptoms or risks identified at this time.                                

- Family history:: not pertinent.                                                                 

                                                                                                  

                                                                                                  

ROS:                                                                                              

13:16 Eyes: Negative for injury, pain, redness, and discharge, ENT: Negative for injury,      frederick 

      pain, and discharge, Neck: Negative for injury, pain, and swelling, Cardiovascular:         

      Negative for chest pain, palpitations, and edema, Respiratory: Negative for shortness       

      of breath, cough, wheezing, and pleuritic chest pain, Back: Negative for injury and         

      pain, : Negative for injury, bleeding, discharge, and swelling, MS/Extremity:             

      Negative for injury and deformity, Skin: Negative for injury, rash, and discoloration,      

      Neuro: Negative for headache, weakness, numbness, tingling, and seizure, Psych:             

      Negative for depression, anxiety, suicide ideation, homicidal ideation, and                 

      hallucinations, Allergy/Immunology: Negative for hives, rash, and allergies, Endocrine:     

      Negative for neck swelling, polydipsia, polyuria, polyphagia, and marked weight             

      changes, Hematologic/Lymphatic: Negative for swollen nodes, abnormal bleeding, and          

      unusual bruising.                                                                           

13:16 Constitutional: Positive for chills, fever, malaise.                                        

13:16 Abdomen/GI: Positive for abdominal pain, nausea, diarrhea, abdominal cramps.                

                                                                                                  

Exam:                                                                                             

13:16 Constitutional:  This is a well developed, well nourished patient who is awake, alert,  frederick 

      and in no acute distress. Head/Face:  Normocephalic, atraumatic. Eyes:  Pupils equal        

      round and reactive to light, extra-ocular motions intact.  Lids and lashes normal.          

      Conjunctiva and sclera are non-icteric and not injected.  Cornea within normal limits.      

      Periorbital areas with no swelling, redness, or edema. ENT:  Nares patent. No nasal         

      discharge, no septal abnormalities noted.  Tympanic membranes are normal and external       

      auditory canals are clear.  Oropharynx with no redness, swelling, or masses, exudates,      

      or evidence of obstruction, uvula midline.  Mucous membranes moist. Neck:  Trachea          

      midline, no thyromegaly or masses palpated, and no cervical lymphadenopathy.  Supple,       

      full range of motion without nuchal rigidity, or vertebral point tenderness.  No            

      Meningismus. Chest/axilla:  Normal chest wall appearance and motion.  Nontender with no     

      deformity.  No lesions are appreciated. Cardiovascular:  Regular rate and rhythm with a     

      normal S1 and S2.  No gallops, murmurs, or rubs.  Normal PMI, no JVD.  No pulse             

      deficits. Respiratory:  Lungs have equal breath sounds bilaterally, clear to                

      auscultation and percussion.  No rales, rhonchi or wheezes noted.  No increased work of     

      breathing, no retractions or nasal flaring. Abdomen/GI:  Soft, non-tender, with normal      

      bowel sounds.  No distension or tympany.  No guarding or rebound.  No evidence of           

      tenderness throughout. Back:  No spinal tenderness.  No costovertebral tenderness.          

      Full range of motion. Skin:  Warm, dry with normal turgor.  Normal color with no            

      rashes, no lesions, and no evidence of cellulitis. MS/ Extremity:  Pulses equal, no         

      cyanosis.  Neurovascular intact.  Full, normal range of motion. Neuro:  Awake and           

      alert, GCS 15, oriented to person, place, time, and situation.  Cranial nerves II-XII       

      grossly intact.  Motor strength 5/5 in all extremities.  Sensory grossly intact.            

      Cerebellar exam normal.  Normal gait. Psych:  Awake, alert, with orientation to person,     

      place and time.  Behavior, mood, and affect are within normal limits.                       

                                                                                                  

Vital Signs:                                                                                      

12:49  / 84; Pulse 118; Resp 20; Temp 98.2(TE); Pulse Ox 100% ; Weight 77.11 kg; Height hb  

      5 ft. 5 in. (165.10 cm); Pain 9/10;                                                         

13:23  / 76; Pulse 113; Resp 16; Pulse Ox 100% ;                                        jl7 

12:49 Body Mass Index 28.29 (77.11 kg, 165.10 cm)                                             hb  

                                                                                                  

MDM:                                                                                              

12:57 Patient medically screened.                                                             [facility] 

13:19 Data reviewed: vital signs, nurses notes, lab test result(s), urinalysis, pyuria.       [facility] 

                                                                                                  

                                                                                             

13:04 Order name: Urine Microscopic Only                                                        

                                                                                             

13:12 Order name: Urine Dipstick--Ancillary (enter results)                                     

                                                                                             

13:04 Order name: Urine Dipstick-Ancillary (obtain specimen); Complete Time: 13:04              

                                                                                             

13:12 Order name: Urine Pregnancy--Ancillary (enter results)                                  bd  

                                                                                                  

Administered Medications:                                                                         

13:35 Drug: Rocephin (cefTRIAXone) 1 grams Route: IM; Site: right gluteus;                    [facility] 

13:50 Follow up: Response: No adverse reaction                                                [facility] 

                                                                                                  

                                                                                                  

Disposition:                                                                                      

19 13:20 Discharged to Home. Impression: Malaise and fatigue, Diarrhea, unspecified,        

  Urinary tract infection, site not specified, Weakness.                                          

- Condition is Stable.                                                                            

- Discharge Instructions: Food Choices to Help Relieve Diarrhea, Adult, Diarrhea,                 

  Adult, Dysuria, Urinary Tract Infection, Adult, Weakness, Fatigue, Urinary Tract                

  Infection, Adult, Easy-to-Read, Diarrhea, Adult, Easy-to-Read, Weakness, Easy-to-Read.          

- Prescriptions for Zofran 4 mg Oral Tablet - take 1 tablet by ORAL route every 12                

  hours As needed; 20 tablet. Bactrim - 160 mg Oral Tablet - take 1 tablet by               

  ORAL route every 12 hours for 7 days; 14 tablet.                                                

- Medication Reconciliation Form, Thank You Letter, Antibiotic Education, Prescription            

  Opioid Use form.                                                                                

- Follow up: Private Physician; When: 2 - 3 days; Reason: Recheck today's complaints,             

  Continuance of care, Re-evaluation by your physician.                                           

- Problem is new.                                                                                 

- Symptoms have improved.                                                                         

                                                                                                  

                                                                                                  

                                                                                                  

Signatures:                                                                                       

Dispatcher [employer]                           EDMS                                                 

Mayito Coulter MD MD cha Williams, Irene RN                     RN   Erna Hess, RN                     RN   Paul Rojas RN                        RN   jl7                                                  

                                                                                                  

Corrections: (The following items were deleted from the chart)                                    

14:03 13:20 2019 13:20 Discharged to Home. Impression: Malaise and fatigue; Diarrhea,   jl7 

      unspecified; Urinary tract infection, site not specified; Weakness. Condition is            

      Stable. Forms are Medication Reconciliation Form, Thank You Letter, Antibiotic              

      Education, Prescription Opioid Use. Follow up: Private Physician; When: 2 - 3 days;         

      Reason: Recheck today's complaints, Continuance of care, Re-evaluation by your              

      physician. Problem is new. Symptoms have improved. frederick                                      

                                                                                                  

**************************************************************************************************

## 2019-11-18 NOTE — ER
Nurse's Notes                                                                                     

 [facility]                                                                 

Name: Temi Cartagena                                                                             

Age: 24 yrs                                                                                       

Sex: Female                                                                                       

: 1995                                                                                   

MRN: [id number]                                                                                   

Arrival Date: 2019                                                                          

Time: 12:41                                                                                       

Account#: [id number]                                                                            

Bed 18                                                                                            

Private MD:                                                                                       

Diagnosis: Malaise and fatigue;Diarrhea, unspecified;Urinary tract infection, site not            

  specified;Weakness                                                                              

                                                                                                  

Presentation:                                                                                     

                                                                                             

12:48 Presenting complaint: Abdominal cramping, nausea, headache, chills, and subjective      hb  

      fever x 2 days. Transition of care: patient was not received from another setting of        

      care. Onset of symptoms was 2019. Risk Assessment: Do you want to hurt         

      yourself or someone else? Patient reports no desire to harm self or others. Care prior      

      to arrival: None.                                                                           

12:48 Method Of Arrival: Ambulatory                                                           hb  

12:48 Acuity: CLAUDIO 3                                                                           hb  

13:49 Initial Sepsis Screen: Does the patient meet any 2 criteria? HR > 90 bpm. No. Patient's jl7 

      initial sepsis screen is negative. Does the patient have a suspected source of              

      infection? No. Patient's initial sepsis screen is negative.                                 

                                                                                                  

OB/GYN:                                                                                           

12:49 LMP 10/21/2019                                                                          hb  

                                                                                                  

Historical:                                                                                       

- Allergies:                                                                                      

12:50 No Known Allergies;                                                                     hb  

- PMHx:                                                                                           

12:50 Anxiety;                                                                                hb  

- PSHx:                                                                                           

12:50 None;                                                                                   hb  

                                                                                                  

- Immunization history:: Adult Immunizations up to date.                                          

- Social history:: Smoking status: Patient uses tobacco products, smokes one pack                 

  cigarettes per day.                                                                             

- Ebola Screening: : No symptoms or risks identified at this time.                                

- Family history:: not pertinent.                                                                 

                                                                                                  

                                                                                                  

Screenin:08 Abuse screen: Denies threats or abuse. Denies injuries from another. Nutritional        jl7 

      screening: No deficits noted. Tuberculosis screening: No symptoms or risk factors           

      identified. Fall Risk None identified.                                                      

                                                                                                  

Assessment:                                                                                       

13:08 General: Appears in no apparent distress. uncomfortable, Behavior is cooperative,       jl7 

      anxious. Pain: Complains of pain in abdomen diffusely Pain currently is 9 out of 10 on      

      a pain scale. Neuro: Level of Consciousness is awake, alert, obeys commands, Oriented       

      to person, place, time, situation. Cardiovascular: Patient's skin is warm and dry.          

      Respiratory: Airway is patent Respiratory effort is even, unlabored, Respiratory            

      pattern is regular, symmetrical. GI: Abdomen is non-distended, Bowel sounds present X 4     

      quads. : No signs and/or symptoms were reported regarding the genitourinary system.       

      EENT: No signs and/or symptoms were reported regarding the EENT system. Derm: Skin is       

      pink, warm \T\ dry.                                                                         

13:35 Reassessment: Pt will be discharged once shot time is up.                               jl7 

                                                                                                  

Vital Signs:                                                                                      

12:49  / 84; Pulse 118; Resp 20; Temp 98.2(TE); Pulse Ox 100% ; Weight 77.11 kg; Height hb  

      5 ft. 5 in. (165.10 cm); Pain 9/10;                                                         

13:23  / 76; Pulse 113; Resp 16; Pulse Ox 100% ;                                        jl7 

12:49 Body Mass Index 28.29 (77.11 kg, 165.10 cm)                                             hb  

                                                                                                  

ED Course:                                                                                        

12:41 Patient arrived in ED.                                                                  mr  

12:49 Triage completed.                                                                         

12:49 Arm band placed on.                                                                       

12:52 Paul Mccall RN is Primary Nurse.                                                      jl7 

12:57 Mayito Coulter MD is Attending Physician.                                             [facility] 

13:08 Patient has correct armband on for positive identification. Bed in low position. Call   jl7 

      light in reach. Side rails up X 1. Pulse ox on. NIBP on.                                    

13:08 Urine collected: clean catch specimen, cloudy.                                          jl7 

13:49 No provider procedures requiring assistance completed. Patient did not have IV access   jl7 

      during this emergency room visit.                                                           

                                                                                                  

Administered Medications:                                                                         

13:35 Drug: Rocephin (cefTRIAXone) 1 grams Route: IM; Site: right gluteus;                    jl7 

13:50 Follow up: Response: No adverse reaction                                                jl7 

                                                                                                  

                                                                                                  

Outcome:                                                                                          

13:20 Discharge ordered by MD.                                                                [facility] 

13:50 Discharged to home ambulatory.                                                          jl7 

13:50 Condition: stable                                                                           

13:50 Discharge instructions given to patient, Instructed on discharge instructions, follow       

      up and referral plans. medication usage, Demonstrated understanding of instructions,        

      follow-up care, medications, Prescriptions given X 2.                                       

14:03 Patient left the ED.                                                                    jl7 

                                                                                                  

Signatures:                                                                                       

Mayito Coulter MD MD cha Rivera, Mary                                 mr GibbsErna, RN                     RN                                                      

Paul Mccall RN RN   jl7                                                  

                                                                                                  

**************************************************************************************************

## 2019-11-18 NOTE — XMS REPORT
Summary of Care

 Created on:2019



Patient:Temi Cartagena

Sex:Female

:1995

External Reference #:[id number]





Demographics







 Address  [address]



   [place] [address]

 

 Mobile Phone  [phone]

 

 Home Phone  [phone]

 

 Email Address  [email or web address]

 

 Preferred Language  English

 

 Marital Status  Single

 

 Confucianism Affiliation  Unknown

 

 Race  White

 

 Ethnic Group   or 









Author







 Organization  [facility]

 

 Address  [address] [place] [address]









Support







 Name  Relationship  Address  Phone

 

 Andrew George  Unavailable  [address]  [phone]



     [place] [address]  

 

 Vicky Cartagena  Unavailable  [address]  [phone]



     [place] [address]  









Care Team Providers







 Name  Role  Phone

 

 Shravan Sidhu [facility]  Primary Care Provider  [phone]









Reason for Visit







 Reason  Comments

 

 Assessment  ER Visit







Encounter Details







 Date  Type  Department  Care Team  Description

 

 2019  Telephone  [facility]  Shravan Sidhu,  Assessment (ER 
Visit)



     [facility]



  



     [address] [facility]



  [address] [place], [place] [address] [phone] (Fax)  







Allergies







 Active Allergy  Reactions  Severity  Noted Date  Comments

 

 Metoclopramide Hcl  Anxiety    10/14/2018  



documented as of this encounter (statuses as of 2019)



Medications







 Medication  Sig  Dispensed  Refills  Start Date  End Date  Status

 

 proMETHazine 25 mg  Take 1 tablet by  30 tablet  0  2018    Active



 tabletIndications:  mouth every 6          



 Nausea and vomiting  (six) hours as          



 during pregnancy  needed for          



 prior to 22 weeks  Nausea and          



 gestation  Vomiting (N/V).          

 

 proMETHazine 25 mg  Insert 1  4 Suppository  1  2018    Active



 suppository  Suppository into          



   rectum every 4          



   (four) hours as          



   needed for          



   Nausea and          



   Vomiting (N/V).          

 

 famotidine 20 mg  Take 1 tablet by  60 tablet  3  10/03/2018    Active



 tablet  mouth 2 (two)          



   times daily.          

 

 proCHLORperazine  Insert 1  12 Suppository  0  10/14/2018    Active



 (COMPAZINE) 25 mg  Suppository into          



 suppository  rectum every 12          



   (twelve) hours          



   as needed for          



   Nausea and          



   Vomiting (N/V).          

 

 proCHLORperazine 10  Take 1 tablet by  30 tablet  3  10/15/2018    Active



 mg tablet  mouth every 6          



   (six) hours as          



   needed for          



   Nausea and          



   Vomiting (N/V).          

 

 doxylamine-pyridoxine  Take 4 tablets  120 tablet  1  10/15/2018    Active



 , vit B6, (DICLEGIS)  by mouth at          



 10-10 mg per  bedtime. Take 1          



 tabletIndications:  tab in the          



 Nausea and vomiting  morning, 1 tab          



 during pregnancy  with lunch and 2          



 prior to 22 weeks  tabs at bedtime.          



 gestation            



documented as of this encounter (statuses as of 2019)



Active Problems







 Problem  Noted Date

 

 Glucose tolerance test abnormal  2019

 

 12 weeks gestation of pregnancy  10/14/2018

 

 Rubella non-immune status, antepartum  10/10/2018

 

 Dyspepsia  10/03/2018

 

 Obesity in pregnancy  2018

 

 UTI (urinary tract infection) during pregnancy  2018









 Overview: 







 TONIE at next visit -neg









 Supervision of high risk pregnancy in third trimester  2018

 

 Multiparity  2018

 

 Marijuana use  2018









 Overview: 







 Reports due to nausea









 Ovarian cyst  2018









 Overview: 







 Per outside us[facility] see external provided records









 Atypical squamous cells of undetermined significance (ASCUS) on  2018



 Papanicolaou smear of cervix  









 Overview: 







 Repeat pap in 12 months









 History of depression  2018

 

 History of anxiety  2016

 

 Nausea and vomiting during pregnancy prior to 22 weeks gestation  2016



documented as of this encounter (statuses as of 2019)



Resolved Problems







 Problem  Noted Date  Resolved Date

 

 Hyperemesis  10/06/2018  10/09/2018

 

 Dehydration  2018  10/07/2018

 

 11 weeks gestation of pregnancy  2018  10/09/2018

 

 Well woman exam  2018

 

 Contraceptive management  2018

 

 Nexplanon removal  2018









 Overview: 







 Per patient reports was only for 2 years









 History of depression  2016

 

 UTI in pregnancy, antepartum  2016









 Overview: 







 Still current uti as of 16









 Supervision of high risk pregnancy, antepartum  2016

 

 BV (bacterial vaginosis)  2016



documented as of this encounter (statuses as of 2019)



Immunizations







 Name  Administration Dates  Next Due

 

 Influenza Virus Vaccine Quad .5 mL IM 6+ MO  2019, 10/18/2018  

 

 Influenza Virus Vaccine Quad IM 3+ YRS  2016  

 

 TDAP (ADACEL) VACCINE  2016  

 

 Tdap  2019  



documented as of this encounter



Social History







 Tobacco Use  Types  Packs/Day  Years Used  Date

 

 Never Smoker        









 Smokeless Tobacco: Never Used      









 Alcohol Use  Drinks/Week  oz/Week  Comments

 

 No  0 Standard drinks or equivalent  0.0  









 Sex Assigned at Birth  Date Recorded

 

 Not on file  









 Job Start Date  Occupation  Industry

 

 Not on file  Not on file  Not on file









 Travel History  Travel Start  Travel End









 No recent travel history available.



documented as of this encounter



Last Filed Vital Signs

Not on filedocumented in this encounter



Plan of Treatment







 Date  Type  Specialty  Care Team  Description

 

 2019  Office Visit  [facility]  2, [facility] Room  

 

 2019  Office Visit  [facility]  Shravan Sidhu, FNP



  



       [address] [place] [address] [phone] (Fax)  









 Health Maintenance  Due Date  Last Done  Comments

 

 HPV VACCINES (1 - Female  2010    



 3-dose series)      

 

 INFLUENZA VACCINE  2019, 10/18/2018,  



     2016  

 

 CHLAMYDIA SCREENING  10/06/2019  10/06/2018, 2018,  



     2018, Additional  



     history exists  

 

 PAP SMEAR  2021  

 

 DTaP,Tdap,and Td Vaccines  2029, 2016  



 (3 - Td)      

 

 PNEUMOCOCCAL 0-64 YEARS  Aged Out    No longer eligible



 COMBINED SERIES      based on patient's age



       to complete this topic



documented as of this encounter



Results

Not on filedocumented in this encounter



Insurance







 Payer  Benefit Plan /  Subscriber ID  Effective  Phone  Address  Type



   Group    Dates      

 

 [facility]  xxxxxxxxx  2018-Mary Kate    [address]  Medicaid



 HEALTH CHOICE -  HEALTH CHOICE    [place]    [id number]



  



 [place]NA[place]ICA[place]          [id number]  



documented as of this encounter



Advance Directives







 Name  Relationship  Healthcare Agent  Communication



     Relationship  

 

 Andrew George  Spouse  Primary healthcare agent  [phone]



       (Mobile)[phone] (Home)

 

 Vicky Cartagena  Mother  [facility] healthcare  [phone]



     agent  (Mobile)[phone] (Home)

## 2019-11-18 NOTE — XMS REPORT
Patient Summary Document

 Created on:2019



Patient:FELIPE CLARKE

Sex:Female

:1995

External Reference #:[id number]





Demographics







 Address  [address]



   [place] [address]

 

 Home Phone  [phone]

 

 Email Address  NONE

 

 Preferred Language  Unknown

 

 Marital Status  Unknown

 

 Muslim Affiliation  Unknown

 

 Race  Unknown

 

 Additional Race(s)  Unavailable

 

 Ethnic Group  Unknown









Author







 Organization  [facility]

 

 Address  [address] [place] [address] [place] [address]

 

 Phone  [phone]









Care Team Providers







 Name  Role  Phone

 

 Unavailable  Unavailable  Unavailable









Problems

This patient has no known problems.



Allergies, Adverse Reactions, Alerts

This patient has no known allergies or adverse reactions.



Medications

This patient has no known medications.

## 2020-06-18 ENCOUNTER — HOSPITAL ENCOUNTER (EMERGENCY)
Dept: HOSPITAL 97 - ER | Age: 25
Discharge: HOME | End: 2020-06-18
Payer: COMMERCIAL

## 2020-06-18 VITALS — OXYGEN SATURATION: 98 % | DIASTOLIC BLOOD PRESSURE: 69 MMHG | SYSTOLIC BLOOD PRESSURE: 122 MMHG | TEMPERATURE: 97.8 F

## 2020-06-18 DIAGNOSIS — Z3A.08: ICD-10-CM

## 2020-06-18 DIAGNOSIS — O98.311: Primary | ICD-10-CM

## 2020-06-18 PROCEDURE — 99281 EMR DPT VST MAYX REQ PHY/QHP: CPT

## 2020-06-18 NOTE — EDPHYS
Physician Documentation                                                                           

 [facility]                                                                 

Name: Temi Cartagena                                                                             

Age: 25 yrs                                                                                       

Sex: Female                                                                                       

: 1995                                                                                   

MRN: [id number]                                                                                   

Arrival Date: 2020                                                                          

Time: 17:36                                                                                       

Account#: [id number]                                                                            

Bed 15                                                                                            

Private MD:                                                                                       

ED Physician Soy Tineo                                                                     

HPI:                                                                                              

                                                                                             

20:39 This 25 yrs old  Female presents to ER via Ambulatory with complaints of        tw4 

      Vaginal Itching, 8 wks Preg.                                                                

20:39 The patient presents with a possible exposure to a sexually transmitted disease,        tw4 

      herpes, and has been treated with nothing to date. Onset: The symptoms/episode              

      began/occurred yesterday. Modifying factors: The symptoms are alleviated by nothing,        

      the symptoms are aggravated by nothing. Associated signs and symptoms: The patient has      

      no apparent associated signs or symptoms. Severity of symptoms: At their worst the          

      symptoms were moderate, in the emergency department the symptoms are unchanged. The         

      patient has not experienced similar symptoms in the past.                                   

                                                                                                  

Historical:                                                                                       

- Allergies:                                                                                      

18:01 No Known Allergies;                                                                     ss  

- PMHx:                                                                                           

18:01 Anxiety;                                                                                ss  

                                                                                                  

- Immunization history:: Adult Immunizations unknown.                                             

- Social history:: Smoking status: Patient/guardian denies using tobacco, Stopped _               

  months ago 1.                                                                                   

                                                                                                  

                                                                                                  

ROS:                                                                                              

20:39  Positive for vaginal itching.                                                        tw4 

20:39 Constitutional: Negative for fever, chills, and weight loss, Eyes: Negative for injury,     

      pain, redness, and discharge, Cardiovascular: Negative for chest pain, palpitations,        

      and edema, Respiratory: Negative for shortness of breath, cough, wheezing, and              

      pleuritic chest pain, Abdomen/GI: Negative for abdominal pain, nausea, vomiting,            

      diarrhea, and constipation, MS/Extremity: Negative for injury and deformity, Skin:          

      Negative for injury, rash, and discoloration.                                               

                                                                                                  

Exam:                                                                                             

20:39 Constitutional:  This is a well developed, well nourished patient who is awake, alert,  tw4 

      and in no acute distress. Head/Face:  Normocephalic, atraumatic. Chest/axilla:  Normal      

      chest wall appearance and motion.  Nontender with no deformity.  No lesions are             

      appreciated. Cardiovascular:  Regular rate and rhythm with a normal S1 and S2.  No          

      gallops, murmurs, or rubs.  Normal PMI, no JVD.  No pulse deficits. Respiratory:  Lungs     

      have equal breath sounds bilaterally, clear to auscultation and percussion.  No rales,      

      rhonchi or wheezes noted.  No increased work of breathing, no retractions or nasal          

      flaring. Abdomen/GI:  Soft, non-tender, with normal bowel sounds.  No distension or         

      tympany.  No guarding or rebound.  No evidence of tenderness throughout.                    

20:39 : Pelvic Exam: External exam: herpes lesions noted.                                       

                                                                                                  

Vital Signs:                                                                                      

17:57  / 69; Pulse 87; Resp 16; Temp 97.8; Pulse Ox 98% on R/A; Weight 90.72 kg; Height ss  

      5 ft. 5 in. (165.10 cm);                                                                    

17:57 Body Mass Index 33.28 (90.72 kg, 165.10 cm)                                             ss  

                                                                                                  

MDM:                                                                                              

19:43 Patient medically screened.                                                             tw4 

20:39 Data reviewed: vital signs, nurses notes. Data interpreted: Pulse oximetry:             tw4 

      Interpretation: normal. Counseling: I had a detailed discussion with the patient and/or     

      guardian regarding: the historical points, exam findings, and any diagnostic results        

      supporting the discharge/admit diagnosis. Special discussion: I discussed with the          

      patient/guardian in detail that at this point there is no indication for admission to       

      the hospital. It is understood, however, that if the symptoms persist or worsen the         

      patient needs to return immediately for re-evaluation. ED course: PT has lesions            

      consistent with HSV. Instructed that patient should followup with PCP and OB/Gyn.           

                                                                                                  

Administered Medications:                                                                         

No medications were administered                                                                  

                                                                                                  

                                                                                                  

Disposition:                                                                                      

20 20:46 Discharged to Home. Impression: Herpesviral vesicular dermatitis.                  

- Condition is Stable.                                                                            

- Discharge Instructions: Genital Herpes.                                                         

- Prescriptions for Acyclovir 200 mg Oral Capsule - take 1 capsule by ORAL route 5                

  times per day; 50 capsule.                                                                      

- Medication Reconciliation Form, Thank You Letter, Antibiotic Education, Prescription            

  Opioid Use form.                                                                                

- Follow up: Private Physician; When: Upon discharge from the Emergency Department;               

  Reason: Recheck today's complaints, Continuance of care, Re-evaluation by your                  

  physician.                                                                                      

- Problem is new.                                                                                 

- Symptoms have improved.                                                                         

                                                                                                  

                                                                                                  

                                                                                                  

Signatures:                                                                                       

Rimma Duggan RN                      RN                                                      

Soy Tineo MD MD   tw4                                                  

Kareen Carlton RN                       RN   ls4                                                  

                                                                                                  

Corrections: (The following items were deleted from the chart)                                    

21:06 20:46 2020 20:46 Discharged to Home. Impression: Herpesviral vesicular            ls4 

      dermatitis. Condition is Stable. Forms are Medication Reconciliation Form, Thank You        

      Letter, Antibiotic Education, Prescription Opioid Use. Follow up: Private Physician;        

      When: Upon discharge from the Emergency Department; Reason: Recheck today's complaints,     

      Continuance of care, Re-evaluation by your physician. Problem is new. Symptoms have         

      improved. tw4                                                                               

                                                                                                  

**************************************************************************************************

## 2020-06-18 NOTE — ER
Nurse's Notes                                                                                     

 [facility]                                                                 

Name: Temi Cartagena                                                                             

Age: 25 yrs                                                                                       

Sex: Female                                                                                       

: 1995                                                                                   

MRN: [id number]                                                                                   

Arrival Date: 2020                                                                          

Time: 17:36                                                                                       

Account#: [id number]                                                                            

Bed 15                                                                                            

Private MD:                                                                                       

Diagnosis: Herpesviral vesicular dermatitis                                                       

                                                                                                  

Presentation:                                                                                     

                                                                                             

17:57 Chief complaint: Patient states: "I'm having a herpes outbreak on my vagina. It started ss  

      3-4 days ago and I'm about 8 weeks pregnant.". Coronavirus screen: Proceed with normal      

      triage. Patient denies a cough. Patient denies shortness of breath or difficulty            

      breathing. Patient denies measured and/or subjective temperature greater than 100.4F        

      prior to today's visit. Patient denies travel on a cruise ship or to a country the [facility]      

      currently lists as an affected area. Patient denies contact with known and/or suspected     

      case of COVID-19. Ebola Screen: Patient denies exposure to infectious person. Patient       

      denies travel to an Ebola-affected area in the 21 days before illness onset. Initial        

      Sepsis Screen: Does the patient meet any 2 criteria? No. Patient's initial sepsis           

      screen is negative. Does the patient have a suspected source of infection? No.              

      Patient's initial sepsis screen is negative. Risk Assessment: Do you want to hurt           

      yourself or someone else? Patient reports no desire to harm self or others. Onset of        

      symptoms was 2020.                                                                 

17:57 Method Of Arrival: Ambulatory                                                             

17:57 Acuity: CLAUDIO 4                                                                           ss  

                                                                                                  

Historical:                                                                                       

- Allergies:                                                                                      

18:01 No Known Allergies;                                                                     ss  

- PMHx:                                                                                           

18:01 Anxiety;                                                                                ss  

                                                                                                  

- Immunization history:: Adult Immunizations unknown.                                             

- Social history:: Smoking status: Patient/guardian denies using tobacco, Stopped _               

  months ago 1.                                                                                   

                                                                                                  

                                                                                                  

Vital Signs:                                                                                      

17:57  / 69; Pulse 87; Resp 16; Temp 97.8; Pulse Ox 98% on R/A; Weight 90.72 kg; Height ss  

      5 ft. 5 in. (165.10 cm);                                                                    

17:57 Body Mass Index 33.28 (90.72 kg, 165.10 cm)                                               

                                                                                                  

ED Course:                                                                                        

17:36 Patient arrived in ED.                                                                  ag5 

18:01 Triage completed.                                                                       ss  

18:01 Arm band placed on right wrist.                                                         ss  

19:43 Soy Tineo MD is Attending Physician.                                            tw4 

19:49 Kareen Carlton, RN is Primary Nurse.                                                     ls4 

                                                                                                  

Administered Medications:                                                                         

No medications were administered                                                                  

                                                                                                  

                                                                                                  

Outcome:                                                                                          

20:46 Discharge ordered by MD.                                                                tw4 

21:06 Patient left the ED.                                                                    ls4 

                                                                                                  

Signatures:                                                                                       

Rimma Duggan, RN                      RN                                                      

Soy Tineo MD MD   4                                                  

Kareen Carlton, RN                       RN   4                                                  

[facility] [place] [address]                                                  

                                                                                                  

**************************************************************************************************

## 2020-06-18 NOTE — XMS REPORT
Continuity of Care Document

                            Created on:2020



Patient:FELIPE CLARKE

Sex:Female

:1995

External Reference #:[id number]





Demographics







                          Address                   [address]



                                                    [place] [address]

 

                          Home Phone                [phone]

 

                          Mobile Phone              [phone]

 

                          Email Address             NONE

 

                          Preferred Language        English

 

                          Marital Status            Unknown

 

                          Baptism Affiliation     Unknown

 

                          Race                      Unknown

 

                          Additional Race(s)        Unavailable



                                                    White

 

                          Ethnic Group              Unknown









Author







                          Organization              [facility]

t

 

                          Address                   [address]  [address]



                                                    [place] [address]

 

                          Phone                     [phone]









Support







                Name            Relationship    Address         Phone

 

                Ariel       Spouse          [address] [phone]



                                                [place] [address] 

 

                Mitzi          Mother          [address] [phone]

[phone]



                                                [place] [address] 

 

                Mitzi          Mother          [address] [phone]



                                                [place] [address] 









Care Team Providers







                    Name                Role                Phone

 

                    Lulu IZQUIERDO          Attending Clinician Unavailable

 

                    IKE Abel      Attending Clinician [phone]









Problems

This patient has no known problems.



Allergies, Adverse Reactions, Alerts

This patient has no known allergies or adverse reactions.



Medications

This patient has no known medications.



Procedures

This patient has no known procedures.



Encounters







        Start   End     Encounter Admission Attending Care    Care    Encounter 

Source



        Date/Time Date/Time Type    Type    Clinicians Facility Department ID   

   

 

        2020 Nurse           HIMANSHU Lawson    [id number]

[id number] 



        00:00:00 00:00:00 Triage          Cherie DUNBAR   [id number]         



                                                [facility] [id number]         



                                                        [phone]         



                                                        019             

 

        2019 Telephone         WING Sidhu    [phone]

[id number] 



        00:00:00 00:00:00                 [facility]  [id number]        

 



                                                REGIONAL [id number]         



                                                MATERNAL [phone]         



                                                & CHILD [address] [place] [address]                 







Results

This patient has no known results.

## 2020-06-18 NOTE — XMS REPORT
Summary of Care

                            Created on:2020



Patient:Temi Cartagena

Sex:Female

:1995

External Reference #:[id number]





Demographics







                          Address                   [address]



                                                    [place] [address]

 

                          Mobile Phone              [phone]

 

                          Home Phone                [phone]

 

                          Email Address             [email or web address]

[email or web address]

 

                          Preferred Language        English

 

                          Marital Status            Single

 

                          Episcopalian Affiliation     Unknown

 

                          Race                      White

 

                          Ethnic Group               or 









Author







                          Organization              [facility]

 

                          Address                   [address] [place] [address]









Support







                Name            Relationship    Address         Phone

 

                Andrew George Unavailable     [address] [phone]



                                                [place] [address] 

 

                Vicky Mitzi  Unavailable     [address] [phone]



                                                [place] [address] 









Care Team Providers







                    Name                Role                Phone

 

                    Pcp,  Does Not Have A Primary Care Provider [phone]









Reason for Visit







                          Reason                    Comments

 

                          Vaginal Problem           







Encounter Details







             Date         Type         Department   Care Team    Description

 

                    2020          Nurse Triage        [facility]



                                        Cherie Lawson RN



                                        Vaginal Problem



                                       [address] [place] [address] 



                                                            [id number] [phone]              







Allergies







             Active Allergy Reactions    Severity     Noted Date   Comments

 

             Metoclopramide Hcl Anxiety                   10/14/2018   



documented as of this encounter (statuses as of 2020)



Medications







          Medication Sig       Dispensed Refills   Start Date End Date  Status

 

          proMETHazine 25 mg Take 1 tablet by 30 tablet 0         2018    

       Active



          tabletIndications: mouth every 6                                      

   



          Nausea and vomiting (six) hours as                                    

     



          during pregnancy needed for                                         



          prior to 22 weeks Nausea and                                         



          gestation Vomiting (N/V).                                         

 

          proMETHazine 25 mg Insert 1  4 Suppository 1         2018       

    Active



          suppository Suppository into                                         



                    rectum every 4                                         



                    (four) hours as                                         



                    needed for                                         



                    Nausea and                                         



                    Vomiting (N/V).                                         

 

          famotidine 20 mg Take 1 tablet by 60 tablet 3         10/03/2018      

     Active



          tablet    mouth 2 (two)                                         



                    times daily.                                         

 

          proCHLORperazine Insert 1  12 Suppository 0         10/14/2018        

   Active



          (COMPAZINE) 25 mg Suppository into                                    

     



          suppository rectum every 12                                         



                    (twelve) hours                                         



                    as needed for                                         



                    Nausea and                                         



                    Vomiting (N/V).                                         

 

          proCHLORperazine 10 Take 1 tablet by 30 tablet 3         10/15/2018   

        Active



          mg tablet mouth every 6                                         



                    (six) hours as                                         



                    needed for                                         



                    Nausea and                                         



                    Vomiting (N/V).                                         

 

          doxylamine-pyridoxine Take 4 tablets 120 tablet 1         10/15/2018  

         Active



          , vit B6, (DICLEGIS) by mouth at                                      

   



          10-10 mg per bedtime. Take 1                                         



          tabletIndications: tab in the                                         



          Nausea and vomiting morning, 1 tab                                    

     



          during pregnancy with lunch and 2                                     

    



          prior to 22 weeks tabs at bedtime.                                    

     



          gestation                                                   

 

          ciprofloxacin HCl 500 Take 1 tablet by 20 tablet 0         2019 

          Active



          mg tabletIndications: mouth 2 (two)                                   

      



          Colitis   times daily.                                         

 

          metroNIDAZOLE 500 mg Take 1 tablet by 14 tablet 0         2019  

         Active



          tabletIndications: mouth 2 (two)                                      

   



          Colitis   times daily.                                         

 

          ondansetron 4 mg Take 1 tablet by 20 tablet 0         2019      

     Active



          disintegrating mouth every 8                                         



          tabletIndications: (eight) hours as                                   

      



          Colitis   needed for                                         



                    Nausea and                                         



                    Vomiting (N/V).                                         



documented as of this encounter (statuses as of 2020)



Active Problems







                          Problem                   Noted Date

 

                          Glucose tolerance test abnormal 2019

 

                          12 weeks gestation of pregnancy 10/14/2018

 

                          Rubella non-immune status, antepartum 10/10/2018

 

                          Dyspepsia                 10/03/2018

 

                          Obesity in pregnancy      2018

 

                          UTI (urinary tract infection) during pregnancy 

018









                                        Overview: 







                                        TONIE at next visit -neg









                          Supervision of high risk pregnancy in third trimester 

2018

 

                          Multiparity               2018

 

                          Marijuana use             2018









                                        Overview: 







                                        Reports due to nausea









                          Ovarian cyst              2018









                                        Overview: 







                                        Per outside usg see external provided re

cords









                          Atypical squamous cells of undetermined significance (

ASCUS) on 2018



                          Papanicolaou smear of cervix 









                                        Overview: 







                                        Repeat pap in 12 months









                          History of depression     2018

 

                          History of anxiety        2016

 

                          Nausea and vomiting during pregnancy prior to 22 weeks

 gestation 2016



documented as of this encounter (statuses as of 2020)



Resolved Problems







                    Problem             Noted Date          Resolved Date

 

                    Hyperemesis         10/06/2018          10/09/2018

 

                    Dehydration         2018          10/07/2018

 

                    11 weeks gestation of pregnancy 2018          10/09/20

18

 

                    Well woman exam     2018

 

                    Contraceptive management 2018

 

                    Nexplanon removal   2018









                                        Overview: 







                                        Per patient reports was only for 2 years









                    History of depression 2016

 

                    UTI in pregnancy, antepartum 2016









                                        Overview: 







                                        Still current uti as of 16









                    Supervision of high risk pregnancy, antepartum 2016

 

                    BV (bacterial vaginosis) 2016



documented as of this encounter (statuses as of 2020)



Immunizations







                    Name                Administration Dates Next Due

 

                    Influenza Virus Vaccine Quad .5 mL IM 6+ MO 2019, 10/1

2018 

 

                    Influenza Virus Vaccine Quad IM 3+ YRS 2016          

 

                    TDAP                2019          

 

                    TDAP (ADACEL) VACCINE 2016          



documented as of this encounter



Social History







             Tobacco Use  Types        Packs/Day    Years Used   Date

 

             Never Smoker                                        









                Smokeless Tobacco: Never Used                                 









                Alcohol Use     Drinks/Week     oz/Week         Comments

 

                No              0 Standard drinks or equivalent 0.0             









                          Sex Assigned at Birth     Date Recorded

 

                          Not on file               









                    Job Start Date      Occupation          Industry

 

                    Not on file         Not on file         Not on file









                    Travel History      Travel Start        Travel End









                                        No recent travel history available.



documented as of this encounter



Last Filed Vital Signs

Not on filedocumented in this encounter



Plan of Treatment







             Date         Type         Specialty    Care Team    Description

 

             2020   [place] [facility] 

 

                2020      Initial Prenatal Visit [facility]   [facility] [place], [facility]



                                        



                                                                [address] [address]

[phone] (Fax) 









                Health Maintenance Due Date        Last Done       Comments

 

                HPV VACCINES (1 - Female 2006                      



                2-dose series)                                  

 

                Depression Screening 2007                      

 

                INFLUENZA VACCINE (Season 2020,     



                Ended)                          10/18/2018,     



                                                2016      

 

                PAP SMEAR       2021      

 

                DTaP,Tdap,and Td Vaccines (3 2029,     



                - Td)                           2016      

 

                PNEUMOCOCCAL 0-64 YEARS Aged Out                        No longe

r eligible based



                COMBINED SERIES                                 on patient's age

 to



                                                                complete this to

pic



documented as of this encounter



Results

Not on filedocumented in this encounter



Advance Directives







                Name            Relationship    Healthcare Agent Communication



                                                Relationship    

 

                Andrew George Spouse          Primary healthcare agent [phone]



                                                                [phone])[phone] (Home)

 

                Vicky Cartagena  Mother          [facility] [place] [id number] 



                                                agent           (Mobile)[phone] (Home)

## 2020-06-20 ENCOUNTER — HOSPITAL ENCOUNTER (EMERGENCY)
Dept: HOSPITAL 97 - ER | Age: 25
Discharge: HOME | End: 2020-06-20
Payer: COMMERCIAL

## 2020-06-20 VITALS — OXYGEN SATURATION: 99 %

## 2020-06-20 VITALS — TEMPERATURE: 97.9 F | SYSTOLIC BLOOD PRESSURE: 126 MMHG | DIASTOLIC BLOOD PRESSURE: 89 MMHG

## 2020-06-20 DIAGNOSIS — Z3A.08: ICD-10-CM

## 2020-06-20 DIAGNOSIS — O21.0: Primary | ICD-10-CM

## 2020-06-20 LAB
ALBUMIN SERPL BCP-MCNC: 4.4 G/DL (ref 3.4–5)
ALP SERPL-CCNC: 102 U/L (ref 45–117)
ALT SERPL W P-5'-P-CCNC: 14 U/L (ref 12–78)
AST SERPL W P-5'-P-CCNC: 9 U/L (ref 15–37)
BUN BLD-MCNC: 12 MG/DL (ref 7–18)
GLUCOSE SERPLBLD-MCNC: 87 MG/DL (ref 74–106)
HCT VFR BLD CALC: 37.9 % (ref 36–45)
LIPASE SERPL-CCNC: 150 U/L (ref 73–393)
LYMPHOCYTES # SPEC AUTO: 1.5 K/UL (ref 0.7–4.9)
PMV BLD: 11.3 FL (ref 7.6–11.3)
POTASSIUM SERPL-SCNC: 3.6 MMOL/L (ref 3.5–5.1)
RBC # BLD: 4.43 M/UL (ref 3.86–4.86)
UA COMPLETE W REFLEX CULTURE PNL UR: (no result)

## 2020-06-20 PROCEDURE — 81015 MICROSCOPIC EXAM OF URINE: CPT

## 2020-06-20 PROCEDURE — 80076 HEPATIC FUNCTION PANEL: CPT

## 2020-06-20 PROCEDURE — 87088 URINE BACTERIA CULTURE: CPT

## 2020-06-20 PROCEDURE — 81025 URINE PREGNANCY TEST: CPT

## 2020-06-20 PROCEDURE — 85025 COMPLETE CBC W/AUTO DIFF WBC: CPT

## 2020-06-20 PROCEDURE — 96361 HYDRATE IV INFUSION ADD-ON: CPT

## 2020-06-20 PROCEDURE — 87086 URINE CULTURE/COLONY COUNT: CPT

## 2020-06-20 PROCEDURE — 96375 TX/PRO/DX INJ NEW DRUG ADDON: CPT

## 2020-06-20 PROCEDURE — 36415 COLL VENOUS BLD VENIPUNCTURE: CPT

## 2020-06-20 PROCEDURE — 87186 SC STD MICRODIL/AGAR DIL: CPT

## 2020-06-20 PROCEDURE — 87077 CULTURE AEROBIC IDENTIFY: CPT

## 2020-06-20 PROCEDURE — 80048 BASIC METABOLIC PNL TOTAL CA: CPT

## 2020-06-20 PROCEDURE — 96374 THER/PROPH/DIAG INJ IV PUSH: CPT

## 2020-06-20 PROCEDURE — 99284 EMERGENCY DEPT VISIT MOD MDM: CPT

## 2020-06-20 PROCEDURE — 81003 URINALYSIS AUTO W/O SCOPE: CPT

## 2020-06-20 PROCEDURE — 83690 ASSAY OF LIPASE: CPT

## 2020-06-20 NOTE — EDPHYS
Physician Documentation                                                                           

 [facility]                                                                 

Name: Temi Cartagena                                                                             

Age: 25 yrs                                                                                       

Sex: Female                                                                                       

: 1995                                                                                   

MRN: [id number]                                                                                   

Arrival Date: 2020                                                                          

Time: 00:59                                                                                       

Account#: [id number]                                                                            

Bed 5                                                                                             

Private MD:                                                                                       

ED Physician Soy Tineo                                                                     

HPI:                                                                                              

                                                                                             

01:54 This 25 yrs old  Female presents to ER via Ambulatory with complaints of        tw4 

      Nausea/Vomiting, Decreased Appetite, About 8wks preg.                                       

01:54 The patient presents to the emergency department with nausea, vomiting. Onset: The      tw4 

      symptoms/episode began/occurred 1 week(s) ago. Possible causes: unknown. The symptoms       

      are aggravated by nothing. The symptoms are alleviated by nothing. Severity of              

      symptoms: At their worst the symptoms were moderate in the emergency department the         

      symptoms are unchanged. The patient has not experienced similar symptoms in the past.       

                                                                                                  

OB/GYN:                                                                                           

01:40 LMP 2020                                                                              rr5 

                                                                                                  

Historical:                                                                                       

- Allergies:                                                                                      

01:07 No Known Allergies;                                                                     sg  

- PMHx:                                                                                           

01:07 Anxiety;                                                                                sg  

- PSHx:                                                                                           

01:07 None;                                                                                   sg  

                                                                                                  

- Immunization history:: Adult Immunizations up to date.                                          

- Social history:: Smoking status: Patient denies any tobacco usage or history of.                

                                                                                                  

                                                                                                  

ROS:                                                                                              

01:54 Constitutional: Negative for fever, chills, and weight loss, Eyes: Negative for injury, tw4 

      pain, redness, and discharge, Cardiovascular: Negative for chest pain, palpitations,        

      and edema, Respiratory: Negative for shortness of breath, cough, wheezing, and              

      pleuritic chest pain, Back: Negative for injury and pain, MS/Extremity: Negative for        

      injury and deformity, Skin: Negative for injury, rash, and discoloration, Neuro:            

      Negative for headache, weakness, numbness, tingling, and seizure.                           

01:54 Abdomen/GI: Positive for nausea and vomiting, nausea, vomiting, and diarrhea, nausea,       

      vomiting.                                                                                   

                                                                                                  

Exam:                                                                                             

01:54 Constitutional:  This is a well developed, well nourished patient who is awake, alert,  tw4 

      and in no acute distress. Head/Face:  Normocephalic, atraumatic. Chest/axilla:  Normal      

      chest wall appearance and motion.  Nontender with no deformity.  No lesions are             

      appreciated. Cardiovascular:  Regular rate and rhythm with a normal S1 and S2.  No          

      gallops, murmurs, or rubs.  Normal PMI, no JVD.  No pulse deficits. Respiratory:  Lungs     

      have equal breath sounds bilaterally, clear to auscultation and percussion.  No rales,      

      rhonchi or wheezes noted.  No increased work of breathing, no retractions or nasal          

      flaring. Skin:  Warm, dry with normal turgor.  Normal color with no rashes, no lesions,     

      and no evidence of cellulitis. MS/ Extremity:  Pulses equal, no cyanosis.                   

      Neurovascular intact.  Full, normal range of motion. Neuro:  Awake and alert, GCS 15,       

      oriented to person, place, time, and situation.  Cranial nerves II-XII grossly intact.      

      Motor strength 5/5 in all extremities.  Sensory grossly intact.  Cerebellar exam            

      normal.  Normal gait.                                                                       

01:54 Abdomen/GI: Inspection: abdomen appears normal, Bowel sounds: normal, Palpation:            

      abdomen is soft and non-tender.                                                             

                                                                                                  

Vital Signs:                                                                                      

01:10  / 75; Pulse 78; Resp 19; Temp 97.7; Pulse Ox 100% ; Weight 90.72 kg; Height 5    rr5 

      ft. 5 in. (165.10 cm); Pain 0/10;                                                           

02:50  / 62; Pulse 75; Resp 17; Pulse Ox 99% ;                                          rr5 

03:51  / 75; Pulse 70; Resp 16; Pulse Ox 99% ;                                          rr5 

04:45  / 89; Pulse 79; Resp 19; Temp 97.9; Pulse Ox 99% ;                               rr5 

01:10 Body Mass Index 33.28 (90.72 kg, 165.10 cm)                                             rr5 

                                                                                                  

MDM:                                                                                              

01:19 Patient medically screened.                                                             tw4 

04:35 Differential diagnosis: Nonspecific abd pain, gastritis. Data reviewed: vital signs,    tw4 

      EMS record. Data reviewed: lab test result(s), CBC, electrolytes. Data interpreted:         

      Pulse oximetry: Interpretation: normal. Counseling: I had a detailed discussion with        

      the patient and/or guardian regarding: the historical points, exam findings, and any        

      diagnostic results supporting the discharge/admit diagnosis. Medication response:           

      Phenergan relieved the patient's nausea, Zofran partially relieved the patient's            

      nausea. Response to treatment: and as a result, I will discharge patient. Special           

      discussion: I discussed with the patient/guardian in detail that at this point there is     

      no indication for admission to the hospital. It is understood, however, that if the         

      symptoms persist or worsen the patient needs to return immediately for re-evaluation.       

                                                                                                  

                                                                                             

01:18 Order name: Basic Metabolic Panel                                                       [facility] 

                                                                                             

01:18 Order name: CBC with Diff                                                               [facility] 

                                                                                             

01:18 Order name: Hepatic Function; Complete Time: 02:40                                      [facility] 

                                                                                             

02:40 Interpretation: Normal except: AST 9; TP 8.9; GLOB 4.5; A/G 1.0.                        [facility] 

                                                                                             

01:18 Order name: Lipase; Complete Time: 02:40                                                [facility] 

                                                                                             

02:42 Interpretation: Within normal limits: .                                          [facility] 

                                                                                             

01:18 Order name: Urine Microscopic Only; Complete Time: 02:40                                [facility] 

                                                                                             

02:41 Interpretation: Normal except: UWBC 10-20; URBC 5-10; UBACT >50; SQEPI 10-20.           [facility] 

                                                                                             

01:19 Order name: Basic Metabolic Panel; Complete Time: 02:40                                 [facility]

                                                                                             

02:42 Interpretation: Within normal limits.                                                   [facility] 

                                                                                             

01:19 Order name: CBC with Automated Diff                                                     [facility]

                                                                                             

01:30 Order name: Urine Pregnancy--Ancillary (enter results); Complete Time: 02:43            tt3 

                                                                                             

02:43 Interpretation: Abnormal: URINE PREG POS.                                               [facility] 

                                                                                             

01:30 Order name: Urine Dipstick--Ancillary (enter results); Complete Time: 02:40             tt3 

                                                                                             

02:24 Order name: Urine Culture                                                               [facility]

                                                                                             

01:18 Order name: IV Saline Lock; Complete Time: 01:42                                         

                                                                                             

01:18 Order name: Labs collected and sent; Complete Time: 01:42                                

                                                                                             

01:18 Order name: Urine Dipstick-Ancillary (obtain specimen); Complete Time: 01:42             

                                                                                             

02:40 Order name: PO challenge; Complete Time: 04:44                                           

                                                                                                  

Administered Medications:                                                                         

01:43 Drug: NS 0.9% 1000 ml Route: IV; Rate: 1 bolus; Site: left forearm;                     rr5 

03:00 Follow up: Response: No adverse reaction; IV Status: Completed infusion; IV Intake:     rr5 

      1000ml                                                                                      

01:43 Drug: Zofran (Ondansetron) 4 mg Route: IVP; Site: left forearm;                         rr5 

02:30 Follow up: Response: No adverse reaction; No change in condition                        rr5 

02:54 Drug: Zofran (Ondansetron) 4 mg Route: IVP; Site: left forearm;                         rr5 

03:30 Follow up: Response: No adverse reaction; No change in condition                        rr5 

03:50 Drug: Phenergan 12.5 mg Route: IVP; Site: left forearm;                                 rr5 

04:44 Follow up: Response: No adverse reaction; Marked relief of symptoms                     rr5 

                                                                                                  

                                                                                                  

Disposition:                                                                                      

20 04:37 Discharged to Home. Impression: Nausea with vomiting, unspecified, Hyperemesis     

  gravidum.                                                                                       

- Condition is Stable.                                                                            

- Discharge Instructions: Hyperemesis Gravidarum, Nausea and Vomiting, Adult, Nausea,             

  Adult.                                                                                          

- Prescriptions for Zofran 4 mg Oral Tablet - take 1 tablet by ORAL route every 12                

  hours As needed; 20 tablet. promethazine 25 mg Oral Tablet - take 1 tablet by ORAL              

  route every 6 hours As needed; 20 tablet.                                                       

- Medication Reconciliation Form, Thank You Letter, Antibiotic Education, Prescription            

  Opioid Use form.                                                                                

- Follow up: Private Physician; When: Upon discharge from the Emergency Department;               

  Reason: If symptoms return, Recheck today's complaints, Continuance of care,                    

  Re-evaluation by your physician.                                                                

- Problem is new.                                                                                 

- Symptoms have improved.                                                                         

                                                                                                  

                                                                                                  

                                                                                                  

Signatures:                                                                                       

Dispatcher [employer]                           EDMS                                                 

Raul Garcia, RN                         RN   Soy Lazar MD MD   tw4                                                  

Khoa Moore RN                      RN   rr5                                                  

                                                                                                  

Corrections: (The following items were deleted from the chart)                                    

02:43 02:43 Within normal limits: URINE PREG POS. tw4                                         tw4 

04:46 04:37 2020 04:37 Discharged to Home. Impression: Nausea with vomiting,            rr5 

      unspecified; Hyperemesis gravidum. Condition is Stable. Forms are Medication                

      Reconciliation Form, Thank You Letter, Antibiotic Education, Prescription Opioid Use.       

      Follow up: Private Physician; When: Upon discharge from the Emergency Department;           

      Reason: If symptoms return, Recheck today's complaints, Continuance of care,                

      Re-evaluation by your physician. Problem is new. Symptoms have improved. tw4                

                                                                                                  

**************************************************************************************************

## 2020-06-20 NOTE — XMS REPORT
Continuity of Care Document

                            Created on:2020



Patient:FELIPE CLARKE

Sex:Female

:1995

External Reference #:[id number]





Demographics







                          Address                   [address]



                                                    [place] [address]

 

                          Home Phone                [phone]

 

                          Mobile Phone              [phone]

 

                          Email Address             [email or web address]

[email or web address]

 

                          Preferred Language        English

 

                          Marital Status            Unknown

 

                          [employer] Affiliation     Unknown

 

                          Race                      Unknown

 

                          Additional Race(s)        White



                                                    Unavailable

 

                          Ethnic Group              Unknown









Author







                          Organization              [facility]

t

 

                          Address                   [address]  [address]



                                                    [place] [address]

 

                          Phone                     [phone]









Support







                Name            Relationship    Address         Phone

 

                Ariel       Spouse          [address] [phone]



                                                [place] [address] 

 

                Mitzi          Mother          [address] [phone]

[phone]



                                                [place] [address] 

 

                Mitzi          Mother          [address] [phone]



                                                [place] [address] 









Care Team Providers







                    Name                Role                Phone

 

                    Lulu IZQUIERDO          Attending Clinician Unavailable

 

                    IKE Abel      Attending Clinician [phone]









Problems

This patient has no known problems.



Allergies, Adverse Reactions, Alerts

This patient has no known allergies or adverse reactions.



Medications

This patient has no known medications.



Procedures

This patient has no known procedures.



Encounters







        Start   End     Encounter Admission Attending Care    Care    Encounter 

Source



        Date/Time Date/Time Type    Type    Clinicians Facility Department ID   

   

 

        2020 Nurse           HIMANSHU Lawson    [id number]

[id number] 



        00:00:00 00:00:00 Triage          Cherie BASILIOY   [id number]         



                                                [facility] [id number]         



                                                        [phone]         



                                                        019             

 

        2019 Telephone         WING Sidhu    [phone] [id number]

[id number] 



        00:00:00 00:00:00                 [facility]  [id number]        

 



                                                REGIONAL [id number]         



                                                MATERNAL [phone]         



                                                & CHILD [address] [place] [address]                 







Results

This patient has no known results.

## 2020-06-20 NOTE — ER
Nurse's Notes                                                                                     

 [facility]                                                                 

Name: Temi Cartagena                                                                             

Age: 25 yrs                                                                                       

Sex: Female                                                                                       

: 1995                                                                                   

MRN: [id number]                                                                                   

Arrival Date: 2020                                                                          

Time: 00:59                                                                                       

Account#: [id number]                                                                            

Bed 5                                                                                             

Private MD:                                                                                       

Diagnosis: Nausea with vomiting, unspecified;Hyperemesis gravidum                                 

                                                                                                  

Presentation:                                                                                     

                                                                                             

01:06 Acuity: CLAUDIO 3                                                                           sg  

01:10 Chief complaint: Patient states: I feel feel really nauseous, vomiting and decreased    rr5 

      appetite started a week ago. denies vaginal bleeding. denies pain.                          

01:10 Coronavirus screen: Proceed with normal triage. Ebola Screen: Patient negative for      rr5 

      fever greater than or equal to 101.5 degrees Fahrenheit, and additional compatible          

      Ebola Virus Disease symptoms Patient denies exposure to infectious person. Patient          

      denies travel to an Ebola-affected area in the 21 days before illness onset. Initial        

      Sepsis Screen: Does the patient meet any 2 criteria? No. Patient's initial sepsis           

      screen is negative. Does the patient have a suspected source of infection? No.              

      Patient's initial sepsis screen is negative. Risk Assessment: Do you want to hurt           

      yourself or someone else? Patient reports no desire to harm self or others. Onset of        

      symptoms was 2020.                                                                 

01:10 Method Of Arrival: Ambulatory                                                           rr5 

                                                                                                  

OB/GYN:                                                                                           

01:40 LMP 2020                                                                              rr5 

                                                                                                  

Historical:                                                                                       

- Allergies:                                                                                      

01:07 No Known Allergies;                                                                     sg  

- PMHx:                                                                                           

01:07 Anxiety;                                                                                sg  

- PSHx:                                                                                           

01:07 None;                                                                                   sg  

                                                                                                  

- Immunization history:: Adult Immunizations up to date.                                          

- Social history:: Smoking status: Patient denies any tobacco usage or history of.                

                                                                                                  

                                                                                                  

Screenin:49 Abuse screen: Denies threats or abuse. Denies injuries from another. Nutritional        rr5 

      screening: No deficits noted. Tuberculosis screening: No symptoms or risk factors           

      identified. Fall Risk IV access (20 points). Total Hua Fall Scale indicates No Risk       

      (0-24 pts).                                                                                 

                                                                                                  

Assessment:                                                                                       

01:25 General: Appears in no apparent distress. uncomfortable, Behavior is calm, cooperative, rr5 

      appropriate for age. Pain: Denies pain. Neuro: Level of Consciousness is awake, alert,      

      obeys commands, Oriented to person, place, time, situation. Cardiovascular: Capillary       

      refill is > 3 seconds Patient's skin is warm and dry. Respiratory: Airway is patent         

      Respiratory effort is even, unlabored, Respiratory pattern is regular, symmetrical. GI:     

      Abdomen is round non-distended, Reports intolerance of fluids, intolerance of food,         

      nausea, vomiting, Patient currently denies abdominal pain, diarrhea. : No signs           

      and/or symptoms were reported regarding the genitourinary system. EENT: No signs and/or     

      symptoms were reported regarding the EENT system. Derm: Skin is intact, is healthy with     

      good turgor, Skin temperature is warm. Musculoskeletal: Circulation, motion, and            

      sensation intact. Capillary refill < 3 seconds.                                             

02:10 Reassessment: Patient appears in no apparent distress at this time. Patient is alert,   rr5 

      oriented x 3, equal unlabored respirations, skin warm/dry/pink. Patient states feeling      

      better. Patient states symptoms have improved.                                              

02:50 GI: Pt is actively vomiting clear fluid, small in amount ED provider aware with order   rr5 

      made and carried out.                                                                       

03:35 Reassessment: Patient appears in no apparent distress at this time. patient complaints  rr5 

      she still gagging and nauseated. ED provider with order made and carried out.               

04:03 Reassessment: Patient appears in no apparent distress at this time. Patient is alert,   rr5 

      oriented x 3, equal unlabored respirations, skin warm/dry/pink. Patient states feeling      

      better. Patient states symptoms have improved.                                              

04:45 Reassessment: Patient appears in no apparent distress at this time. Patient is alert,   rr5 

      oriented x 3, equal unlabored respirations, skin warm/dry/pink. discharge instruction       

      given and explained without complaints made. Patient states feeling better. Patient         

      states symptoms have improved.                                                              

                                                                                                  

Vital Signs:                                                                                      

01:10  / 75; Pulse 78; Resp 19; Temp 97.7; Pulse Ox 100% ; Weight 90.72 kg; Height 5    rr5 

      ft. 5 in. (165.10 cm); Pain 0/10;                                                           

02:50  / 62; Pulse 75; Resp 17; Pulse Ox 99% ;                                          rr5 

03:51  / 75; Pulse 70; Resp 16; Pulse Ox 99% ;                                          rr5 

04:45  / 89; Pulse 79; Resp 19; Temp 97.9; Pulse Ox 99% ;                               rr5 

01:10 Body Mass Index 33.28 (90.72 kg, 165.10 cm)                                             rr5 

                                                                                                  

ED Course:                                                                                        

00:59 Patient arrived in ED.                                                                  es  

01:06 Triage completed.                                                                       sg  

01:06 Arm band placed on.                                                                     sg  

01:10 Patient has correct armband on for positive identification. Bed in low position. Call   rr5 

      light in reach. Side rails up X2. Pulse ox on. NIBP on.                                     

01:10 No provider procedures requiring assistance completed. Urine collected: clean catch     rr5 

      specimen, clear.                                                                            

01:19 Soy Tineo MD is Attending Physician.                                            tw4 

01:31 Khoa Moore, RN is Primary Nurse.                                                    rr5 

01:35 Inserted saline lock: 20 gauge in left forearm, using aseptic technique. Blood          rr5 

      collected.                                                                                  

04:46 IV discontinued, intact, bleeding controlled, No redness/swelling at site. Pressure     rr5 

      dressing applied.                                                                           

                                                                                                  

Administered Medications:                                                                         

01:43 Drug: NS 0.9% 1000 ml Route: IV; Rate: 1 bolus; Site: left forearm;                     rr5 

03:00 Follow up: Response: No adverse reaction; IV Status: Completed infusion; IV Intake:     rr5 

      1000ml                                                                                      

01:43 Drug: Zofran (Ondansetron) 4 mg Route: IVP; Site: left forearm;                         rr5 

02:30 Follow up: Response: No adverse reaction; No change in condition                        rr5 

02:54 Drug: Zofran (Ondansetron) 4 mg Route: IVP; Site: left forearm;                         rr5 

03:30 Follow up: Response: No adverse reaction; No change in condition                        rr5 

03:50 Drug: Phenergan 12.5 mg Route: IVP; Site: left forearm;                                 rr5 

04:44 Follow up: Response: No adverse reaction; Marked relief of symptoms                     rr5 

                                                                                                  

                                                                                                  

Intake:                                                                                           

03:00 IV: 1000ml; Total: 1000ml.                                                              rr5 

                                                                                                  

Outcome:                                                                                          

04:37 Discharge ordered by MD.                                                                tw4 

04:46 Discharged to home ambulatory.                                                          rr5 

04:46 Condition: stable                                                                           

04:46 Discharge instructions given to patient, Instructed on discharge instructions, follow       

      up and referral plans. medication usage, Demonstrated understanding of instructions,        

      follow-up care, medications, Prescriptions given X 2.                                       

04:46 Patient left the ED.                                                                    rr5 

                                                                                                  

Signatures:                                                                                       

Raul Garcia RN                         RN                                                      

Leyda Abbott Terrence, MD MD   tw4                                                  

Khoa Moore, RN                      RN   rr5                                                  

                                                                                                  

**************************************************************************************************

## 2020-08-21 ENCOUNTER — HOSPITAL ENCOUNTER (EMERGENCY)
Dept: HOSPITAL 97 - ER | Age: 25
Discharge: HOME | End: 2020-08-21
Payer: COMMERCIAL

## 2020-08-21 VITALS — DIASTOLIC BLOOD PRESSURE: 78 MMHG | SYSTOLIC BLOOD PRESSURE: 117 MMHG

## 2020-08-21 VITALS — OXYGEN SATURATION: 100 %

## 2020-08-21 VITALS — TEMPERATURE: 98.7 F

## 2020-08-21 DIAGNOSIS — Z3A.16: ICD-10-CM

## 2020-08-21 DIAGNOSIS — O23.42: Primary | ICD-10-CM

## 2020-08-21 LAB
ALBUMIN SERPL BCP-MCNC: 3.1 G/DL (ref 3.4–5)
ALP SERPL-CCNC: 60 U/L (ref 45–117)
ALT SERPL W P-5'-P-CCNC: 12 U/L (ref 12–78)
AST SERPL W P-5'-P-CCNC: 8 U/L (ref 15–37)
BUN BLD-MCNC: 5 MG/DL (ref 7–18)
GLUCOSE SERPLBLD-MCNC: 103 MG/DL (ref 74–106)
HCT VFR BLD CALC: 37.3 % (ref 36–45)
LYMPHOCYTES # SPEC AUTO: 0.7 K/UL (ref 0.7–4.9)
MORPHOLOGY BLD-IMP: (no result)
PMV BLD: 11.8 FL (ref 7.6–11.3)
POTASSIUM SERPL-SCNC: 3.5 MMOL/L (ref 3.5–5.1)
RBC # BLD: 4.26 M/UL (ref 3.86–4.86)

## 2020-08-21 PROCEDURE — 83690 ASSAY OF LIPASE: CPT

## 2020-08-21 PROCEDURE — 87086 URINE CULTURE/COLONY COUNT: CPT

## 2020-08-21 PROCEDURE — 81025 URINE PREGNANCY TEST: CPT

## 2020-08-21 PROCEDURE — 80076 HEPATIC FUNCTION PANEL: CPT

## 2020-08-21 PROCEDURE — 87088 URINE BACTERIA CULTURE: CPT

## 2020-08-21 PROCEDURE — 80048 BASIC METABOLIC PNL TOTAL CA: CPT

## 2020-08-21 PROCEDURE — 96361 HYDRATE IV INFUSION ADD-ON: CPT

## 2020-08-21 PROCEDURE — 87186 SC STD MICRODIL/AGAR DIL: CPT

## 2020-08-21 PROCEDURE — 36415 COLL VENOUS BLD VENIPUNCTURE: CPT

## 2020-08-21 PROCEDURE — 96375 TX/PRO/DX INJ NEW DRUG ADDON: CPT

## 2020-08-21 PROCEDURE — 96374 THER/PROPH/DIAG INJ IV PUSH: CPT

## 2020-08-21 PROCEDURE — 99284 EMERGENCY DEPT VISIT MOD MDM: CPT

## 2020-08-21 PROCEDURE — 85025 COMPLETE CBC W/AUTO DIFF WBC: CPT

## 2020-08-21 PROCEDURE — 87077 CULTURE AEROBIC IDENTIFY: CPT

## 2020-08-21 PROCEDURE — 81003 URINALYSIS AUTO W/O SCOPE: CPT

## 2020-08-21 NOTE — XMS REPORT
Summary of Care

                             Created on:2020



Patient:Temi Cartagena

Sex:Female

:1995

External Reference #:[id number]





Demographics







                          Address                   [address]



                                                    [place] [id number]

 

                          Mobile Phone              [phone]

 

                          Home Phone                [phone]

 

                          Email Address             [email or web address]

[email or web address]

 

                          Preferred Language        English

 

                          Marital Status            Single

 

                          Denominational Affiliation     Unknown

 

                          Race                      White

 

                          Ethnic Group               or 









Author







                          Organization              [facility]

 

                          Address                   [address] [place] [id number]









Support







                Name            Relationship    Address         Phone

 

                Andrew George Unavailable     [address] [phone]



                                                [place] [id number] 

 

                Vicky Mitzi  Unavailable     [address] [phone]



                                                [place] [id number] 









Care Team Providers







                    Name                Role                Phone

 

                    Pcp,  Does Not Have A Primary Care Provider [phone]









Encounter Details







             Date         Type         Department   Care Team    Description

 

                    2020          Orders Only         [facility]



                          Doctor Unassigned, No     



                                                            [address] [facility]



                                        Name



                                        



                                                [place] [address] 







Allergies







             Active Allergy Reactions    Severity     Noted Date   Comments

 

             Metoclopramide Hcl Anxiety                   10/14/2018   



documented as of this encounter (statuses as of 2020)



Medications







          Medication Sig       Dispensed Refills   Start Date End Date  Status

 

          proMETHazine 25 mg Take 1 tablet by 30 tablet 0         2018    

       Active



          tabletIndications: mouth every 6                                      

   



          Nausea and vomiting (six) hours as                                    

     



          during pregnancy needed for                                         



          prior to 22 weeks Nausea and                                         



          gestation Vomiting (N/V).                                         

 

          proMETHazine 25 mg Insert 1  4 Suppository 1         2018       

    Active



          suppository Suppository into                                         



                    rectum every 4                                         



                    (four) hours as                                         



                    needed for                                         



                    Nausea and                                         



                    Vomiting (N/V).                                         

 

          famotidine 20 mg Take 1 tablet by 60 tablet 3         10/03/2018      

     Active



          tablet    mouth 2 (two)                                         



                    times daily.                                         

 

          proCHLORperazine Insert 1  12 Suppository 0         10/14/2018        

   Active



          (COMPAZINE) 25 mg Suppository into                                    

     



          suppository rectum every 12                                         



                    (twelve) hours                                         



                    as needed for                                         



                    Nausea and                                         



                    Vomiting (N/V).                                         

 

          proCHLORperazine 10 Take 1 tablet by 30 tablet 3         10/15/2018   

        Active



          mg tablet mouth every 6                                         



                    (six) hours as                                         



                    needed for                                         



                    Nausea and                                         



                    Vomiting (N/V).                                         

 

          doxylamine-pyridoxine Take 4 tablets 120 tablet 1         10/15/2018  

         Active



          , vit B6, (DICLEGIS) by mouth at                                      

   



          10-10 mg per bedtime. Take 1                                         



          tabletIndications: tab in the                                         



          Nausea and vomiting morning, 1 tab                                    

     



          during pregnancy with lunch and 2                                     

    



          prior to 22 weeks tabs at bedtime.                                    

     



          gestation                                                   

 

          ciprofloxacin HCl 500 Take 1 tablet by 20 tablet 0         2019 

          Active



          mg tabletIndications: mouth 2 (two)                                   

      



          Colitis   times daily.                                         

 

          metroNIDAZOLE 500 mg Take 1 tablet by 14 tablet 0         2019  

         Active



          tabletIndications: mouth 2 (two)                                      

   



          Colitis   times daily.                                         

 

          ondansetron 4 mg Take 1 tablet by 20 tablet 0         2019      

     Active



          disintegrating mouth every 8                                         



          tabletIndications: (eight) hours as                                   

      



          Colitis   needed for                                         



                    Nausea and                                         



                    Vomiting (N/V).                                         



documented as of this encounter (statuses as of 2020)



Active Problems







                          Problem                   Noted Date

 

                          Glucose tolerance test abnormal 2019

 

                          12 weeks gestation of pregnancy 10/14/2018

 

                          Rubella non-immune status, antepartum 10/10/2018

 

                          Dyspepsia                 10/03/2018

 

                          Obesity in pregnancy      2018

 

                          UTI (urinary tract infection) during pregnancy 

018









                                        Overview: 







                                        TONIE at next visit -neg









                          Supervision of high risk pregnancy in third trimester 

2018

 

                          Multiparity               2018

 

                          Marijuana use             2018









                                        Overview: 







                                        Reports due to nausea









                          Ovarian cyst              2018









                                        Overview: 







                                        Per outside usg see external provided re

cords









                          Atypical squamous cells of undetermined significance (

ASCUS) on 2018



                          Papanicolaou smear of cervix 









                                        Overview: 







                                        Repeat pap in 12 months









                          History of depression     2018

 

                          History of anxiety        2016

 

                          Nausea and vomiting during pregnancy prior to 22 weeks

 gestation 2016



documented as of this encounter (statuses as of 2020)



Resolved Problems







                    Problem             Noted Date          Resolved Date

 

                    Hyperemesis         10/06/2018          10/09/2018

 

                    Dehydration         2018          10/07/2018

 

                    11 weeks gestation of pregnancy 2018          10/09/20

18

 

                    Well woman exam     2018

 

                    Contraceptive management 2018

 

                    Nexplanon removal   2018









                                        Overview: 







                                        Per patient reports was only for 2 years









                    History of depression 2016

 

                    UTI in pregnancy, antepartum 2016









                                        Overview: 







                                        Still current uti as of 16









                    Supervision of high risk pregnancy, antepartum 2016

 

                    BV (bacterial vaginosis) 2016



documented as of this encounter (statuses as of 2020)



Immunizations







                    Name                Administration Dates Next Due

 

                    Influenza Virus Vaccine Quad .5 mL IM 6+ MO 2019, 10/1

2018 

 

                    Influenza Virus Vaccine Quad IM 3+ YRS 2016          

 

                    TDAP                2019          

 

                    TDAP (ADACEL) VACCINE 2016          



documented as of this encounter



Social History







             Tobacco Use  Types        Packs/Day    Years Used   Date

 

             Never Smoker                                        









                Smokeless Tobacco: Never Used                                 









                Alcohol Use     Drinks/Week     oz/Week         Comments

 

                No              0 Standard drinks or equivalent 0.0             









                          Sex Assigned at Birth     Date Recorded

 

                          Not on file               









                    Job Start Date      Occupation          Industry

 

                    Not on file         Not on file         Not on file









                    Travel History      Travel Start        Travel End









                                        No recent travel history available.



documented as of this encounter



Last Filed Vital Signs

Not on filedocumented in this encounter



Plan of Treatment







             Date         Type         Specialty    Care Team    Description

 

                2020      Office Visit    [facility]   Shravan Sidhu FNP



[address] [place] [address]



[phone] (Fax)                      Arrived



                                                                



1, [facility] Room                    

 

                2020      Initial Prenatal Visit OB IKE Crews FNP



                                        



                                                                [address], 

15



                                        



                                                                [phone] (Fax) 









                Health Maintenance Due Date        Last Done       Comments

 

                HPV VACCINES (1 - Female 2006                      



                2-dose series)                                  

 

                Depression Screening 2007                      

 

                INFLUENZA VACCINE (#1) 2020,     



                                                10/18/2018,     



                                                2016      

 

                PAP SMEAR       2021      

 

                DTaP,Tdap,and Td Vaccines (3 2029,     



                - Td)                           2016      

 

                PNEUMOCOCCAL 0-64 YEARS Aged Out                        No longe

r eligible based



                COMBINED SERIES                                 on patient's age

 to



                                                                complete this to

pic



documented as of this encounter



Procedures







             Procedure Name Priority     Date/Time    Associated Diagnosis Comme

nts

 

             ASSIGNMENT OF BENEFITS Routine      2020  9:13 AM CDT        

      



documented in this encounter



Results

Not on filedocumented in this encounter



Insurance







          Payer     Benefit Plan / Subscriber ID Effective Dates Phone     Addre

ss   Type



                    Group                                             

 

          [employer] [place] 2020-Present [id number] [address] 

  Medicaid



                      [place]                                       [id number] [place] [address] 



                                                            [id number] 



documented as of this encounter



Advance Directives







                Name            Relationship    Healthcare Agent Communication



                                                Relationship    

 

                Andrew George Spouse          Primary healthcare agent [phone]



                                                                (Mobile)[phone] (Home)

 

                Vicky Cartagena  Mother          [place] [id number]





                                                agent           (Mobile)[phone] (Home)

## 2020-08-21 NOTE — ER
Nurse's Notes                                                                                     

 [facility]                                                                 

Name: Temi Cartagena                                                                             

Age: 25 yrs                                                                                       

Sex: Female                                                                                       

: 1995                                                                                   

MRN: [id number]                                                                                   

Arrival Date: 2020                                                                          

Time: 03:09                                                                                       

Account#: [id number]                                                                            

Bed 7                                                                                             

Private MD:                                                                                       

Diagnosis: Hyperemesis gravidarum with metabolic disturbance-not present;Pregnancy related        

  conditions, unspecified, first trimester;Pregnancy related conditions, unspecified,             

  second trimester;Urinary tract infection, site not specified                                    

                                                                                                  

Presentation:                                                                                     

                                                                                             

03:22 Chief complaint: Patient states: Vomiting x 2 days without relief; Patient is about 3   lp1 

      months pregnant; recently prescribed Diclegis, not working for he; denies any other         

      symptoms. Coronavirus screen: Client denies travel out of the [place] in the last 14 days.     

      At this time, the client does not indicate any symptoms associated with coronavirus-19.     

      Ebola Screen: No symptoms or risks identified at this time. Initial Sepsis Screen: Does     

      the patient meet any 2 criteria? No. Patient's initial sepsis screen is negative. Does      

      the patient have a suspected source of infection? No. Patient's initial sepsis screen       

      is negative. Risk Assessment: Do you want to hurt yourself or someone else? Patient         

      reports no desire to harm self or others. Onset of symptoms was 2020.            

03:22 Method Of Arrival: Ambulatory                                                           lp1 

03:22 Acuity: CLAUDIO 3                                                                           lp1 

                                                                                                  

OB/GYN:                                                                                           

03:27 LMP 2020, Pregnancy Verified, EDC 2021, Gestational age from LMP: 16 weeks 3   lp1 

      days                                                                                        

                                                                                                  

Historical:                                                                                       

- Allergies:                                                                                      

03:26 No Known Allergies;                                                                     lp1 

- Home Meds:                                                                                      

03:26 Prenatal Vitamin Oral tab 1 tab once daily [Active];                                    lp1 

- PMHx:                                                                                           

03:26 Anxiety;                                                                                lp1 

- PSHx:                                                                                           

03:26 None;                                                                                   lp1 

                                                                                                  

- Immunization history:: Adult Immunizations up to date.                                          

- Social history:: Smoking status: Patient denies any tobacco usage or history of.                

                                                                                                  

                                                                                                  

Screenin:27 Abuse screen: Denies threats or abuse. Denies injuries from another. Nutritional        lp1 

      screening: No deficits noted. Tuberculosis screening: No symptoms or risk factors           

      identified. Fall Risk None identified.                                                      

                                                                                                  

Assessment:                                                                                       

03:26 General: Appears in no apparent distress. Behavior is appropriate for age. Pain: Denies lp1 

      pain. Neuro: No deficits noted. Cardiovascular: Patient's skin is warm and dry.             

      Respiratory: Respiratory effort is even, unlabored. GI: Pt is actively vomiting bile,       

      Reports nausea, vomiting, since 2 days ago. : No signs and/or symptoms were reported      

      regarding the genitourinary system. EENT: No signs and/or symptoms were reported            

      regarding the EENT system. Derm: Skin is pink, warm \T\ dry. Musculoskeletal: No deficits   

      noted.                                                                                      

04:34 Reassessment: Patient appears in no apparent distress at this time. Patient and/or      lp1 

      family updated on plan of care and expected duration. Pain level reassessed. Patient        

      states some relief; "I still feel a little bit of uneasiness".                              

05:00 Reassessment: Patient appears in no apparent distress at this time. Patient states      lp1 

      continued nausea; provider notified.                                                        

06:00 Reassessment: Patient appears in no apparent distress at this time. Patient vomiting at lp1 

      this time, small amount of emesis noted; Provider notified.                                 

06:28 Reassessment: pt states she is feeling very anxious she doesn't like the way reglan is  bb  

      making her feel reassured pt symptoms would pass, pt states she will try and get some       

      sleep IV site intact, patent with fluids infusing.                                          

07:30 Reassessment: Patient appears in no apparent distress at this time. Patient and/or      ph  

      family updated on plan of care and expected duration. Pain level reassessed. Pt asleep      

      w/ equal and unlabored respirations.                                                        

07:47 Reassessment: Patient appears in no apparent distress at this time. Patient and/or      ph  

      family updated on plan of care and expected duration. Pain level reassessed. Patient is     

      alert, oriented x 3, equal unlabored respirations, skin warm/dry/pink. Pt reports that      

      nausea has improved, states, " My stomach still feels a little uneasy but it's much         

      better." Pt provided w/ water per request, tolerating well at this time, VSS, will          

      continue to monitor.                                                                        

10:00 Reassessment: Patient is alert, oriented x 3, equal unlabored respirations, skin        aa5 

      warm/dry/pink. Patient states feeling better. Pt drank cup of water, tolerated well. .      

                                                                                                  

Vital Signs:                                                                                      

03:22  / 73; Pulse 100; Resp 16; Temp 98.7(O); Pulse Ox 98% on R/A; Weight 81.65 kg     lp1 

      (R); Height 5 ft. 5 in. (165.10 cm); Pain 0/10;                                             

04:00  / 84; Pulse 100; Resp 18; Pulse Ox 98% on R/A;                                   lp1 

06:30  / 66; Pulse 95; Resp 16; Pulse Ox 97% on R/A;                                    lp1 

07:30 BP 97 / 61; Pulse 86; Resp 18; Pulse Ox 100% on R/A;                                    ph  

10:00  / 78; Pulse 86; Resp 16 S; Pulse Ox 100% on R/A;                                 aa5 

03:22 Body Mass Index 29.95 (81.65 kg, 165.10 cm)                                             lp1 

                                                                                                  

ED Course:                                                                                        

03:09 Patient arrived in ED.                                                                  bp1 

03:17 Mauro Dodge MD is Attending Physician.                                             mh7 

03:20 Maribel Yu, RN is Primary Nurse.                                                       lp1 

03:24 Triage completed.                                                                       lp1 

03:24 Arm band placed on.                                                                     lp1 

03:27 Patient has correct armband on for positive identification.                             lp1 

03:50 Inserted saline lock: 22 gauge in right antecubital area, using aseptic technique.      lp1 

      Blood collected.                                                                            

04:02 No provider procedures requiring assistance completed.                                  lp1 

07:28 Attending Physician role handed off by Mauro Dodge MD                              frederick 

07:28 Mayito Coulter MD is Attending Physician.                                             frederick 

08:33 Diallo Barahona MD is Referral Physician.                                              frederick 

10:00 IV discontinued, intact, bleeding controlled, No redness/swelling at site. Pressure     aa5 

      dressing applied.                                                                           

                                                                                                  

Administered Medications:                                                                         

03:50 Drug: NS 0.9% 1000 ml Route: IV; Rate: 1000 ml; Site: right antecubital;                lp1 

05:30 Follow up: IV Status: Completed infusion; IV Intake: 1000ml                             lp1 

03:50 Drug: Zofran (Ondansetron) 4 mg Route: IVP; Site: right antecubital;                    lp1 

04:45 Follow up: Response: Nausea is decreased                                                lp1 

05:49 Drug: Zofran (Ondansetron) 4 mg {Note: Verbal order per Provider.} Route: IVP; Site:    lp1 

      right antecubital;                                                                          

06:29 Follow up: Response: No change in condition                                             lp1 

06:15 Drug: D5-NS 1000 ml Route: IV; Rate: per protocol; Site: right antecubital;             lp1 

07:46 Follow up: Response: No adverse reaction; IV Status: Completed infusion; IV Intake:     ph  

      1000ml                                                                                      

06:15 Drug: Reglan 10 mg Route: IVP; Site: right antecubital;                                 lp1 

07:16 Follow up: Response: No adverse reaction                                                ph  

06:15 Drug: Pepcid 20 mg Route: IVP; Site: right antecubital;                                 lp1 

07:16 Follow up: Response: No adverse reaction                                                ph  

07:46 Drug: Rocephin - (cefTRIAXone) 1 grams Route: IVPB; Infused Over: 30 mins; Site: right  ph  

      antecubital;                                                                                

07:46 Drug: NS 0.9% 1000 ml Route: IV; Rate: 1 bolus; Site: right antecubital;                ph  

10:00 Follow up: IV Status: Completed infusion; IV Intake: 1000ml                             aa5 

10:00 Drug: Augmentin Chewable Tablet 800 mg Route: PO;                                       aa5 

10:00 Follow up: Response: Medication administered at discharge.                              aa5 

                                                                                                  

                                                                                                  

Intake:                                                                                           

05:30 IV: 1000ml; Total: 1000ml.                                                              lp1 

07:46 IV: 1000ml; Total: 2000ml.                                                              ph  

10:00 IV: 1000ml; Total: 3000ml.                                                              aa5 

                                                                                                  

Outcome:                                                                                          

08:34 Discharge ordered by MD.                                                                frederick 

10:10 Discharged to home ambulatory.                                                          aa5 

10:10 Condition: improved                                                                         

10:10 Discharge instructions given to patient, Instructed on discharge instructions, follow       

      up and referral plans. medication usage, Demonstrated understanding of instructions,        

      follow-up care, medications, Prescriptions given X 5                                        

10:15 Patient left the ED.                                                                    aa5 

                                                                                                  

Addendum:                                                                                         

2020                                                                                        

     07:54 Addendum: Culture Results: Positive wound culture. No further action required. Bacteria h
b

           sensitive to prescribed antibiotic.                                                    

                                                                                                  

Signatures:                                                                                       

Mayito Coulter MD MD cha Ballard, Brenda, RN                     RN                                                      

Ximena Gong RN RN   aa5                                                  

Maribel Yu RN RN   lp1                                                  

Thuy Quintero RN RN                                                      

Erna Gibbs RN RN                                                      

Zoraida Graham Maurice, MD MD   mh7                                                  

                                                                                                  

Corrections: (The following items were deleted from the chart)                                    

                                                                                             

10:16 08:50 Augmentin Chewable Tablet 800 mg PO aa5                                           aa5 

10:16 10:15 Response: Medication administered at discharge. aa5                               aa5 

10:19 10:18 Patient left the ED. aa5                                                          aa5 

                                                                                                  

**************************************************************************************************

## 2020-08-21 NOTE — EDPHYS
Physician Documentation                                                                           

 [facility]                                                                 

Name: Temi Cartagena                                                                             

Age: 25 yrs                                                                                       

Sex: Female                                                                                       

: 1995                                                                                   

MRN: [id number]                                                                                   

Arrival Date: 2020                                                                          

Time: 03:09                                                                                       

Account#: [id number]                                                                            

Bed 7                                                                                             

Private MD:                                                                                       

ED Physician Mayito Coulter                                                                      

HPI:                                                                                              

                                                                                             

03:55 This 25 yrs old  Female presents to ER via Ambulatory with complaints of        mh7 

      Dehydrated.                                                                                 

03:55 The patient presents to the emergency department with nausea, that is moderate,         mh7 

      vomiting. Onset: The symptoms/episode began/occurred 2 day(s) ago. Possible causes:         

      pregnancy. The symptoms are aggravated by food , The symptoms are alleviated by             

      nothing. Associated signs and symptoms: Pertinent negatives: abdominal pain, anorexia,      

      belching, constipation, diarrhea, dysuria, fever, flatulence, GI bleeding, hematuria,       

      vaginal discharge. Severity of symptoms: At their worst the symptoms were moderate          

      yesterday, in the emergency department the symptoms have improved moderately. The           

      patient has experienced similar episodes in the past, a few times. Patient is 15 weeks      

      pregnant, [id number], with nausea and vomiting for 2 days. She has had similar issues          

      during pregnancy in the past. She was taking Phenergan without relief. Her doctor           

      prescribed Diclegis but she has not gotten prescription filled yet. She has had relief      

      in th past with Zofran, She denies any abdominal pain, fever, diarrhea, vaginal             

      bleeding, or dysuria..                                                                      

                                                                                                  

OB/GYN:                                                                                           

03:27 LMP 2020, Pregnancy Verified, EDC 2021, Gestational age from LMP: 16 weeks 3   lp1 

      days                                                                                        

                                                                                                  

Historical:                                                                                       

- Allergies:                                                                                      

03:26 No Known Allergies;                                                                     lp1 

- Home Meds:                                                                                      

03:26 Prenatal Vitamin Oral tab 1 tab once daily [Active];                                    lp1 

- PMHx:                                                                                           

03:26 Anxiety;                                                                                lp1 

- PSHx:                                                                                           

03:26 None;                                                                                   lp1 

                                                                                                  

- Immunization history:: Adult Immunizations up to date.                                          

- Social history:: Smoking status: Patient denies any tobacco usage or history of.                

                                                                                                  

                                                                                                  

ROS:                                                                                              

04:22 Constitutional: Negative for fever, chills, and weight loss, Eyes: Negative for injury, mh7 

      pain, redness, and discharge, ENT: Negative for injury, pain, and discharge, Neck:          

      Negative for injury, pain, and swelling, Cardiovascular: Negative for chest pain,           

      palpitations, and edema, Respiratory: Negative for shortness of breath, cough,              

      wheezing, and pleuritic chest pain, Back: Negative for injury and pain, : Negative        

      for injury, bleeding, discharge, and swelling, MS/Extremity: Negative for injury and        

      deformity, Skin: Negative for injury, rash, and discoloration, Neuro: Negative for          

      headache, weakness, numbness, tingling, and seizure, Psych: Negative for depression,        

      anxiety, suicide ideation, homicidal ideation, and hallucinations, Allergy/Immunology:      

      Negative for hives, rash, and allergies, Endocrine: Negative for neck swelling,             

      polydipsia, polyuria, polyphagia, and marked weight changes, Hematologic/Lymphatic:         

      Negative for swollen nodes, abnormal bleeding, and unusual bruising.                        

                                                                                                  

Exam:                                                                                             

04:22 Constitutional:  This is a well developed, well nourished patient who is awake, alert,  mh7 

      and in no acute distress. Head/Face:  Normocephalic, atraumatic. Eyes:  Pupils equal        

      round and reactive to light, extra-ocular motions intact.  Lids and lashes normal.          

      Conjunctiva and sclera are non-icteric and not injected.  Cornea within normal limits.      

      Periorbital areas with no swelling, redness, or edema. ENT:  Nares patent. No nasal         

      discharge, no septal abnormalities noted.  Tympanic membranes are normal and external       

      auditory canals are clear.  Oropharynx with no redness, swelling, or masses, exudates,      

      or evidence of obstruction, uvula midline.  Mucous membranes moist. Neck:  Trachea          

      midline, no thyromegaly or masses palpated, and no cervical lymphadenopathy.  Supple,       

      full range of motion without nuchal rigidity, or vertebral point tenderness.  No            

      Meningismus. Chest/axilla:  Normal chest wall appearance and motion.  Nontender with no     

      deformity.  No lesions are appreciated. Cardiovascular:  Regular rate and rhythm with a     

      normal S1 and S2.  No gallops, murmurs, or rubs.  Normal PMI, no JVD.  No pulse             

      deficits. Respiratory:  Lungs have equal breath sounds bilaterally, clear to                

      auscultation and percussion.  No rales, rhonchi or wheezes noted.  No increased work of     

      breathing, no retractions or nasal flaring.                                                 

04:22 Abdomen/GI:  Soft, non-tender, with normal bowel sounds.  No distension or tympany.  No     

      guarding or rebound.  No evidence of tenderness throughout. Back:  No spinal                

      tenderness.  No costovertebral tenderness.  Full range of motion.                           

04:22 Skin:  Warm, dry with normal turgor.  Normal color with no rashes, no lesions, and no       

      evidence of cellulitis. MS/ Extremity:  Pulses equal, no cyanosis.  Neurovascular           

      intact.  Full, normal range of motion. Neuro:  Awake and alert, GCS 15, oriented to         

      person, place, time, and situation.  Cranial nerves II-XII grossly intact.  Motor           

      strength 5/5 in all extremities.  Sensory grossly intact.  Cerebellar exam normal.          

      Normal gait. Psych:  Awake, alert, with orientation to person, place and time.              

      Behavior, mood, and affect are within normal limits.                                        

04:22 Abdomen/GI:                                                                                 

04:22 : CVA tenderness, is absent, Pelvic Exam: The exam is refused by the                      

      patient/guardian.  The risks and consequences are understood by the patient, Gravid         

      exam: Fundal height: consistent with gestational age, Bladder: is normal.                   

                                                                                                  

Vital Signs:                                                                                      

03:22  / 73; Pulse 100; Resp 16; Temp 98.7(O); Pulse Ox 98% on R/A; Weight 81.65 kg     lp1 

      (R); Height 5 ft. 5 in. (165.10 cm); Pain 0/10;                                             

04:00  / 84; Pulse 100; Resp 18; Pulse Ox 98% on R/A;                                   lp1 

06:30  / 66; Pulse 95; Resp 16; Pulse Ox 97% on R/A;                                    lp1 

07:30 BP 97 / 61; Pulse 86; Resp 18; Pulse Ox 100% on R/A;                                    ph  

10:00  / 78; Pulse 86; Resp 16 S; Pulse Ox 100% on R/A;                                 aa5 

03:22 Body Mass Index 29.95 (81.65 kg, 165.10 cm)                                             lp1 

                                                                                                  

MDM:                                                                                              

03:27 Patient medically screened.                                                             [facility] 

07:29 Data reviewed: vital signs, nurses notes, lab test result(s).                           frederick 

08:24 Differential diagnosis: hyperemesis gravidarium. Data interpreted: Cardiac monitor: not frederick 

      applicable for this patient encounter. Pulse oximetry: on room air is 100 %.                

      Counseling: I had a detailed discussion with the patient and/or guardian regarding: the     

      historical points, exam findings, and any diagnostic results supporting the                 

      discharge/admit diagnosis, lab results, the need for outpatient follow up, for              

      definitive care, an OB/Gyne specialist. Medication response: reglan. ED course: much        

      improved, foloow up explained, zofra, phenergan as needed to back up diclegis..             

                                                                                                  

                                                                                             

03:29 Order name: CBC with Diff; Complete Time: 05:02                                         7 

                                                                                             

03:29 Order name: Basic Metabolic Panel; Complete Time: 06:06                                 7 

                                                                                             

03:29 Order name: LFT's; Complete Time: 06:06                                                 [facility] 

                                                                                             

04:34 Order name: Manual Differential; Complete Time: 05:02                                   EDMS

                                                                                             

05:08 Order name: Lipase; Complete Time: 06:06                                                [facility] 

                                                                                             

06:27 Order name: Urine Pregnancy--Ancillary (enter results); Complete Time: 06:39            tt3 

                                                                                             

06:28 Order name: Urine Culture                                                               [facility] 

                                                                                             

06:28 Order name: Urine Dipstick--Ancillary (enter results); Complete Time: 06:39             tt3 

                                                                                             

03:29 Order name: Urine Dipstick-Ancillary (obtain specimen); Complete Time: 06:29            7 

                                                                                             

08:23 Order name: PO challenge; Complete Time: 10:01                                          frederick 

                                                                                                  

Administered Medications:                                                                         

03:50 Drug: NS 0.9% 1000 ml Route: IV; Rate: 1000 ml; Site: right antecubital;                lp1 

05:30 Follow up: IV Status: Completed infusion; IV Intake: 1000ml                             lp1 

03:50 Drug: Zofran (Ondansetron) 4 mg Route: IVP; Site: right antecubital;                    lp1 

04:45 Follow up: Response: Nausea is decreased                                                lp1 

05:49 Drug: Zofran (Ondansetron) 4 mg {Note: Verbal order per Provider.} Route: IVP; Site:    lp1 

      right antecubital;                                                                          

06:29 Follow up: Response: No change in condition                                             lp1 

06:15 Drug: D5-NS 1000 ml Route: IV; Rate: per protocol; Site: right antecubital;             lp1 

07:46 Follow up: Response: No adverse reaction; IV Status: Completed infusion; IV Intake:     ph  

      1000ml                                                                                      

06:15 Drug: Reglan 10 mg Route: IVP; Site: right antecubital;                                 lp1 

07:16 Follow up: Response: No adverse reaction                                                ph  

06:15 Drug: Pepcid 20 mg Route: IVP; Site: right antecubital;                                 lp1 

07:16 Follow up: Response: No adverse reaction                                                ph  

07:46 Drug: Rocephin - (cefTRIAXone) 1 grams Route: IVPB; Infused Over: 30 mins; Site: right  ph  

      antecubital;                                                                                

07:46 Drug: NS 0.9% 1000 ml Route: IV; Rate: 1 bolus; Site: right antecubital;                ph  

10:00 Follow up: IV Status: Completed infusion; IV Intake: 1000ml                             aa5 

10:00 Drug: Augmentin Chewable Tablet 800 mg Route: PO;                                       aa5 

10:00 Follow up: Response: Medication administered at discharge.                              aa5 

                                                                                                  

                                                                                                  

Disposition:                                                                                      

20 08:34 Discharged to Home. Impression: Hyperemesis gravidarum with metabolic              

  disturbance - not present, Pregnancy related conditions, unspecified, first                     

  trimester, Pregnancy related conditions, unspecified, second trimester,                         

  Urinary tract infection, site not specified.                                                    

- Condition is Stable.                                                                            

- Discharge Instructions: Hyperemesis Gravidarum, Morning Sickness, Easy-to-Read,                 

  Urinary Tract Infection, Adult, Morning Sickness, First Trimester of Pregnancy,                 

  Easy-to-Read, Urinary Tract Infection, Adult, Easy-to-Read, First Trimester of                  

  Pregnancy, Pelvic Rest.                                                                         

- Prescriptions for Diclegis 10- 10 mg Oral tablet,delayed release (DR/EC) - take 1               

  tablet by ORAL route 3 times per day and 2 tablets at bedtime; 60 tablet. Prenatal              

  Vitamin 27- 0.8 mg Oral Tablet - take 1 tablet by ORAL route once daily; 30 tablet.             

  Zofran 4 mg Oral Tablet - take 1 tablet by ORAL route every 12 hours As needed; 20              

  tablet. Phenergan 25 mg Rectal Suppository - insert 1 suppository by RECTAL route               

  every 6 hours As needed; 12 suppository. Augmentin 500- 125 mg Oral Tablet - take 1             

  tablet by ORAL route every 12 hours for 7 days; 14 tablet.                                      

- Medication Reconciliation Form, Thank You Letter, Antibiotic Education, Prescription            

  Opioid Use form.                                                                                

- Follow up: Private Physician; When: 2 - 3 days; Reason: Recheck today's complaints,             

  Continuance of care, Re-evaluation by your physician. Follow up: Diallo Barahona MD;           

  When: 2 - 3 days; Reason: Recheck today's complaints, Re-evaluation by your physician.          

- Problem is new.                                                                                 

- Symptoms have improved.                                                                         

                                                                                                  

                                                                                                  

                                                                                                  

Signatures:                                                                                       

Dispatcher [employer]                           Mayito Ferro MD MD cha Calderon, Audri, RN                     RN   aa5                                                  

Maribel Yu RN                         RN   lp1                                                  

Thuy Quintero RN                      RN                                                      

Mauro Dodge MD MD   mh7                                                  

                                                                                                  

Corrections: (The following items were deleted from the chart)                                    

10:01 08:06 FHT's ordered. frederick                                                                aa5 

10:18 08:34 2020 08:34 Discharged to Home. Impression: Hyperemesis gravidarum with      aa5 

      metabolic disturbance - not present; Pregnancy related conditions, unspecified, first       

      trimester; Pregnancy related conditions, unspecified, second trimester; Urinary tract       

      infection, site not specified. Condition is Stable. Forms are Medication Reconciliation     

      Form, Thank You Letter, Antibiotic Education, Prescription Opioid Use. Follow up:           

      Private Physician; When: 2 - 3 days; Reason: Recheck today's complaints, Continuance of     

      care, Re-evaluation by your physician. Follow up: Diallo Barahona; When: 2 - 3 days;         

      Reason: Recheck today's complaints, Re-evaluation by your physician. Problem is new.        

      Symptoms have improved. frederick                                                                 

                                                                                                  

**************************************************************************************************

## 2020-08-21 NOTE — XMS REPORT
Summary of Care

                             Created on:2020



Patient:Temi Cartagena

Sex:Female

:1995

External Reference #:[id number]





Demographics







                          Address                   [address]



                                                    [place] [address]

 

                          Mobile Phone              [phone]

 

                          Home Phone                [phone]

 

                          Email Address             [email or web address]

[email or web address]

 

                          Preferred Language        English

 

                          Marital Status            Single

 

                          Sikhism Affiliation     Unknown

 

                          Race                      White

 

                          Ethnic Group               or 









Author







                          Organization              [facility]

 

                          Address                   [address] [place] [id number]









Support







                Name            Relationship    Address         Phone

 

                Andrew George Unavailable     [address] [phone]



                                                [place] [address] 

 

                Vicky Cartagena  Unavailable     [address] [phone]



                                                [place] [address] 









Care Team Providers







                    Name                Role                Phone

 

                    Pcp,  Does Not Have A Primary Care Provider [phone]









Reason for Visit







                          Reason                    Comments

 

                          URINARY TRACT INFECTION   







Encounter Details







             Date         Type         Department   Care Team    Description

 

             2020   Telephone    [facility] Sidhu, UR

INARY TRACT



                                                            [place]



                                        FN



                                        INFECTION



                                                [address] [facility] [address] [place] [address]



                                        



                                                [place]    [phone] [address] [phone] (Fax)        



                                       [phone]              







Allergies







             Active Allergy Reactions    Severity     Noted Date   Comments

 

             Metoclopramide Hcl Anxiety                   10/14/2018   



documented as of this encounter (statuses as of 2020)



Medications







          Medication Sig       Dispensed Refills   Start Date End Date  Status

 

          proMETHazine 25 mg Take 1 tablet 30 tablet 0         2018       

    Active



          tabletIndications: by mouth every                                     

    



          Nausea and vomiting 6 (six) hours                                     

    



          during pregnancy prior as needed for                                  

       



          to 22 weeks gestation Nausea and                                      

   



                    Vomiting (N/V).                                         

 

          ondansetron 4 mg Take 1 tablet 20 tablet 0         2019         

  Active



          disintegrating by mouth every                                         



          tabletIndications: 8 (eight) hours                                    

     



          Colitis   as needed for                                         



                    Nausea and                                         



                    Vomiting (N/V).                                         

 

          prenatal vit Take 1 Packet 30 Each   6         2020           Ac

tive



          33-iron-folic-dha by mouth daily.                                     

    



          (SELECT-OB + DHA) 29                                                  

 



          mg iron-1 mg -250 mg                                                  

 



          combo packIndications:                                                

   



          Supervision of high                                                   



          risk pregnancy,                                                   



          antepartum                                                   

 

          Nitrofurantoin&Nit. Take 1 capsule 20 capsule 0         2020 Active



          Macrocryst (MACROBID) by mouth 2                                      

   



          100 mg    (two) times                                         



          capsuleIndications: daily for 10                                      

   



          Urinary tract days.                                             



          infection without                                                   



          hematuria, site                                                   



          unspecified                                                   



documented as of this encounter (statuses as of 2020)



Active Problems







                          Problem                   Noted Date

 

                          History of herpes simplex type 2 infection 2020

 

                          Tobacco abuse             2020

 

                          BMI 31.0-31.9,adult       2020

 

                          Glucose tolerance test abnormal 2019

 

                          12 weeks gestation of pregnancy 10/14/2018

 

                          Rubella non-immune status, antepartum 10/10/2018









                                        Overview: 







                                        Address in Postpartum









                          Dyspepsia                 10/03/2018

 

                          Class 1 obesity with body mass index (BMI) of 31.0 to 

31.9 in adult, 2018



                          unspecified obesity type, unspecified whether serious 

comorbidity present 

 

                          UTI (urinary tract infection) during pregnancy 

018









                                        Overview: 







                                        TONIE at next visit -neg









                          Supervision of high risk pregnancy in third trimester 

2018

 

                          Multiparity               2018

 

                          Marijuana use             2018









                                        Overview: 







                                        Reports due to nausea









                          Ovarian cyst              2018









                                        Overview: 







                                        Per outside usg see external provided re

cords









                          Atypical squamous cells of undetermined significance (

ASCUS) on 2018



                          Papanicolaou smear of cervix 









                                        Overview: 







                                        Repeat pap in 12 months









                          History of depression     2018

 

                          History of anxiety        2016

 

                          Nausea and vomiting during pregnancy prior to 22 weeks

 gestation 2016

 

                          Supervision of high risk pregnancy, antepartum 

016









                    Pregnant            Estimated Date of Delivery Comments

 

                    Yes                 2021          Based on last menstr

ual period of 2020



                                                            (Approximate)



documented as of this encounter (statuses as of 2020)



Resolved Problems







                    Problem             Noted Date          Resolved Date

 

                    Hyperemesis         10/06/2018          10/09/2018

 

                    Dehydration         2018          10/07/2018

 

                    11 weeks gestation of pregnancy 2018          10/09/20

18

 

                    Well woman exam     2018

 

                    Contraceptive management 2018

 

                    Nexplanon removal   2018









                                        Overview: 







                                        Per patient reports was only for 2 years









                    History of depression 2016

 

                    UTI in pregnancy, antepartum 2016









                                        Overview: 







                                        Still current uti as of 16









                    BV (bacterial vaginosis) 2016



documented as of this encounter (statuses as of 2020)



Immunizations







                    Name                Administration Dates Next Due

 

                    Influenza Virus Vaccine Quad .5 mL IM 6+ MO 2019, 10/1

2018 

 

                    Influenza Virus Vaccine Quad IM 3+ YRS 2016          

 

                    TDAP                2019          

 

                    TDAP (ADACEL) VACCINE 2016          



documented as of this encounter



Social History







             Tobacco Use  Types        Packs/Day    Years Used   Date

 

             Former Smoker                                        









                Smokeless Tobacco: Never Used                                 









                                        Comments: quit 2020 was smoking 1 

pack per day









                Alcohol Use     Drinks/Week     oz/Week         Comments

 

                No              0 Standard drinks or equivalent 0.0             









                    Pregnant            Estimated Date of Delivery Comments

 

                    Yes                 2021          Based on last menstr

ual period of 2020



                                                            (Approximate)









                          Sex Assigned at Birth     Date Recorded

 

                          Not on file               









                    Job Start Date      Occupation          Industry

 

                    Not on file         Not on file         Not on file









                    Travel History      Travel Start        Travel End









                                        No recent travel history available.









                    COVID-19 Exposure   Response            Date Recorded

 

                    In the last month, have you been in contact with No / Unsure

         2020  9:52 AM 

CDT



                    someone who was confirmed or suspected to have              

       



                    Coronavirus / COVID-19?                     



documented as of this encounter



Last Filed Vital Signs

Not on filedocumented in this encounter



Plan of Treatment







             Date         Type         Specialty    Care Team    Description

 

             2020   Technician Visit Maternal Fetal              



                                       Medicine                  

 

             2020   Routine Prenatal Visit OB Satell[facility] Shravan Sidhu, 



                                                                FNP



                                        



                                                    [address] [address]

15



                                        



                                                                [phone] (Fax) 









                Health Maintenance Due Date        Last Done       Comments

 

                HPV VACCINES (1 - Female 2006                      



                2-dose series)                                  

 

                INFLUENZA VACCINE (#1) 2020,     



                                                10/18/2018,     



                                                2016      

 

                Depression Screening 2021,     



                                                2020      

 

                PAP SMEAR       2023,     



                                                2018      

 

                DTaP,Tdap,and Td Vaccines (3 2029,     



                - Td)                           2016      

 

                PNEUMOCOCCAL 0-64 YEARS Aged Out                        No longe

r eligible based



                COMBINED SERIES                                 on patient's age

 to



                                                                complete this to

pic



documented as of this encounter



Results

Not on filedocumented in this encounter



Visit Diagnoses







                                        Diagnosis

 

                                        Urinary tract infection without hematuri

a, site unspecified - Primary



documented in this encounter



Insurance







          Payer     Benefit Plan / Subscriber ID Effective Dates Phone     Addre

ss   Type



                    Group                                             

 

          [employer] [place] 2020-Present [id number] [address] 

  Medicaid



                      [place]                                       [id number] [place] [address] 



                                                            [id number] 



documented as of this encounter



Advance Directives







                Name            Relationship    Healthcare Agent Communication



                                                Relationship    

 

                Andrew George Spouse          Primary healthcare agent [phone]



                                                                [phone])[phone] (Home)

 

                Vicky Cartagena  Mother          [facility] [phone]





                                                agent           (Mobile)[phone] (Home)

## 2020-08-21 NOTE — XMS REPORT
Continuity of Care Document

                           Created on:2020



Patient:FELIPE CLARKE

Sex:Female

:1995

External Reference #:[id number]





Demographics







                          Address                   [address]  



                                                    [place] [address]

 

                          Home Phone                [phone]

 

                          Mobile Phone              [phone]

 

                          Email Address             NONE

 

                          Preferred Language        English

 

                          Marital Status            Unknown

 

                          Amish Affiliation     Unknown

 

                          Race                      Unknown

 

                          Additional Race(s)        Unavailable



                                                    White

 

                          Ethnic Group              Unknown









Author







                          Organization              [facility]

t

 

                          Address                   [address]



                                                    [place] [address]

 

                          Phone                     [phone]









Support







                Name            Relationship    Address         Phone

 

                Ariel       Spouse          [address] [phone]



                                                [place] [address] 

 

                Mitzi          Mother          [address] [phone]

[phone]



                                                [place] [address] 

 

                Mitzi          Mother          [address] [phone]



                                                [place] [address] 









Care Team Providers







                    Name                Role                Phone

 

                    IKE Abel      Attending Clinician [phone]









Problems

This patient has no known problems.



Allergies, Adverse Reactions, Alerts

This patient has no known allergies or adverse reactions.



Medications

This patient has no known medications.



Procedures

This patient has no known procedures.



Encounters







        Start   End     Encounter Admission Attending Care    Care    Encounter 

Source



        Date/Time Date/Time Type    Type    Clinicians Facility Department ID   

   

 

        2020 Routine         WING Sidhu    [id number]

19 



        14:42:20 15:36:31 Prenatal         [facility]  [id number]       

  



                        Visit                   REGIONAL [id number]         



                                                MATERNAL [id number]         



                                                & CHILD [address]             



                                                [facility]                 

 

        2020 Telephone         WING Sidhu    [phone]

[id number] 



        00:00:00 00:00:00                 [facility]  [id number]        

 



                                                REGIONAL [id number]         



                                                MATERNAL [id number]         



                                                & CHILD 107             



                                                [facility]                 

 

        2020 Abstract         WING Sidhu    [id number]

326 



        00:00:00 00:00:00                 [facility]  [id number]        

 



                                                REGIONAL [id number]         



                                                MATERNAL [phone]         



                                                & CHILD [address] [place] [address]                 







Results

This patient has no known results.

## 2020-08-21 NOTE — XMS REPORT
Summary of Care

                           Created on:2020



Patient:Temi Cartagena

Sex:Female

:1995

External Reference #:[id number]





Demographics







                          Address                   [address]



                                                    [place] [address]

 

                          Mobile Phone              [phone]

 

                          Home Phone                [phone]

 

                          Email Address             [email or web address]



 

                          Preferred Language        English

 

                          Marital Status            Single

 

                          Zoroastrian Affiliation     Unknown

 

                          Race                      White

 

                          Ethnic Group               or 









Author







                          Organization              [facility]

 

                          Address                   [address] [place] [id number]









Support







                Name            Relationship    Address         Phone

 

                Andrew George Unavailable     [address] [phone]



                                                [place] [address] 

 

                Vicky Cartagena  Unavailable     [address] [phone]



                                                [place] [address] 









Care Team Providers







                    Name                Role                Phone

 

                    Pcp,  Does Not Have A Primary Care Provider [phone]









Reason for Referral

 (Routine)





           Status     Reason     Specialty  Diagnoses / Referred By Referred To



                                            Procedures Contact    Contact

 

                New Request                     Maternal Fetal  Diagnoses



Supervision of high risk pregnancy, antepartum Shravan Sidhu          



                                                Medicine        



Procedures



CONSULT MATERNAL FETAL MEDICINE ULTRASOUND Preferred Location: [facility]



                                        



                                                       [address], [place] 



                                                                 [address]



                                        



                                                       Phone:     



                                                                 [phone]



                                        



                                                       Fax:       



                                                       [phone] 









Reason for Visit







                          Reason                    Comments

 

                          ROUTINE PRENATAL VISIT    







Encounter Details







             Date         Type         Department   Care Team    Description

 

             2020   Routine Prenatal [facility] Shravan Sidhu

upervision of 

high risk pregnancy, antepartum (Primary Dx);



                                        Visit               [facility]



                                        Multiparity;



                                       [address] [place] [address]  History o

f herpes simplex type 2 infection;



                                                            Street



                                        [place]



                                        Nausea and vomiting during pregnancy cintia

or to 22 weeks gestation;



                                       [place], [place] Urinary tract i

nfection without hematuria, site 

unspecified;



                                                            [id number]



                                        Tobacco abuse;



                                                [phone]



                                        Class 1 obesity with body mass index (BM

I) of 31.0 to 31.9 in adult, 

unspecified obesity type, unspecified whether serious comorbidity present



                                                    [phone] 



                                                    (Fax)        







Allergies







             Active Allergy Reactions    Severity     Noted Date   Comments

 

             Metoclopramide Hcl Anxiety                   10/14/2018   



documented as of this encounter (statuses as of 2020)



Medications







          Medication Sig       Dispensed Refills   Start Date End Date  Status

 

          proMETHazine 25 mg Take 1 tablet by 30 tablet 0         2018    

       Active



          tabletIndications: mouth every 6                                      

   



          Nausea and vomiting (six) hours as                                    

     



          during pregnancy prior needed for Nausea                              

           



          to 22 weeks gestation and Vomiting                                    

     



                    (N/V).                                            

 

          ondansetron 4 mg Take 1 tablet by 20 tablet 0         2019      

     Active



          disintegrating mouth every 8                                         



          tabletIndications: (eight) hours as                                   

      



          Colitis   needed for Nausea                                         



                    and Vomiting                                         



                    (N/V).                                            

 

          prenatal vit Take 1 Packet by 30 Each   6         2020          

 Active



          33-iron-folic-dha mouth daily.                                        

 



          (SELECT-OB + DHA) 29 mg                                               

    



          iron-1 mg -250 mg combo                                               

    



          packIndications:                                                   



          Supervision of high                                                   



          risk pregnancy,                                                   



          antepartum                                                   

 

          proMETHazine 25 mg Take 1 tablet by 30 tablet 1         2020    

       Active



          tabletIndications: mouth every 4                                      

   



          Nausea and vomiting (four) hours as                                   

      



          during pregnancy prior needed for Nausea                              

           



          to 22 weeks gestation and Vomiting                                    

     



                    (N/V).                                            

 

          doxylamine-pyridoxine, Take 2 tablets by 90 tablet 3         

0           Active



          vit B6, (DICLEGIS) mouth at bedtime.                                  

       



          10-10 mg per                                                   



          tabletIndications:                                                   



          Nausea and vomiting                                                   



          during pregnancy prior                                                

   



          to 22 weeks gestation                                                 

  



documented as of this encounter (statuses as of 2020)



Active Problems







                          Problem                   Noted Date

 

                          History of herpes simplex type 2 infection 2020

 

                          Tobacco abuse             2020

 

                          BMI 31.0-31.9,adult       2020

 

                          Glucose tolerance test abnormal 2019

 

                          12 weeks gestation of pregnancy 10/14/2018

 

                          Rubella non-immune status, antepartum 10/10/2018









                                        Overview: 







                                        Address in Postpartum









                          Dyspepsia                 10/03/2018

 

                          Class 1 obesity with body mass index (BMI) of 31.0 to 

31.9 in adult, 2018



                          unspecified obesity type, unspecified whether serious 

comorbidity present 

 

                          UTI (urinary tract infection) during pregnancy 

018









                                        Overview: 







                                        TONIE at next visit -neg









                          Supervision of high risk pregnancy in third trimester 

2018

 

                          Multiparity               2018

 

                          Marijuana use             2018









                                        Overview: 







                                        Reports due to nausea









                          Ovarian cyst              2018









                                        Overview: 







                                        Per outside [facility] see external provided re

cords









                          Atypical squamous cells of undetermined significance (

ASCUS) on 2018



                          Papanicolaou smear of cervix 









                                        Overview: 







                                        Repeat pap in 12 months









                          History of depression     2018

 

                          History of anxiety        2016

 

                          Nausea and vomiting during pregnancy prior to 22 weeks

 gestation 2016

 

                          Supervision of high risk pregnancy, antepartum 

016









                    Pregnant            Estimated Date of Delivery Comments

 

                    Yes                 2021          Based on Ultrasound,

 FHT: 169, Transverse



                                                            presentation, Placen

ta too early to evaulate.



documented as of this encounter (statuses as of 2020)



Resolved Problems







                    Problem             Noted Date          Resolved Date

 

                    Hyperemesis         10/06/2018          10/09/2018

 

                    Dehydration         2018          10/07/2018

 

                    11 weeks gestation of pregnancy 2018          10/09/20

18

 

                    Well woman exam     2018

 

                    Contraceptive management 2018

 

                    Nexplanon removal   2018









                                        Overview: 







                                        Per patient reports was only for 2 years









                    History of depression 2016

 

                    UTI in pregnancy, antepartum 2016









                                        Overview: 







                                        Still current uti as of 16









                    BV (bacterial vaginosis) 2016



documented as of this encounter (statuses as of 2020)



Immunizations







                    Name                Administration Dates Next Due

 

                    Influenza Virus Vaccine Quad .5 mL IM 6+ MO 2019, 10/1

2018 

 

                    Influenza Virus Vaccine Quad IM 3+ YRS 2016          

 

                    TDAP                2019          

 

                    TDAP (ADACEL) VACCINE 2016          



documented as of this encounter



Social History







             Tobacco Use  Types        Packs/Day    Years Used   Date

 

             Former Smoker                                        









                Smokeless Tobacco: Never Used                                 









                                        Comments: quit 2020 was smoking 1 

pack per day









                Alcohol Use     Drinks/Week     oz/Week         Comments

 

                No              0 Standard drinks or equivalent 0.0             









                    Pregnant            Estimated Date of Delivery Comments

 

                    Yes                 2021          Based on Ultrasound,

 FHT: 169, Transverse



                                                            presentation, Placen

ta too early to evaulate.









                          Sex Assigned at Birth     Date Recorded

 

                          Not on file               









                    COVID-19 Exposure   Response            Date Recorded

 

                    In the last month, have you been in contact with No / Unsure

         2020  2:50 PM 

CDT



                    someone who was confirmed or suspected to have              

       



                    Coronavirus / COVID-19?                     



documented as of this encounter



Last Filed Vital Signs







                Vital Sign      Reading         Time Taken      Comments

 

                Blood Pressure  110/71          2020  2:50 PM CDT 

 

                Pulse           79              2020  2:50 PM CDT 

 

                Temperature     36.6 C (97.9 F) 2020  2:50 PM CDT 

 

                Respiratory Rate 16              2020  2:50 PM CDT 

 

                Oxygen Saturation -               -               

 

                Inhaled Oxygen Concentration -               -               

 

                Weight          81.9 kg (180 lb 9.6 oz) 2020  2:50 PM CDT 

 

                Height          165.1 cm (5' 5") 2020  2:50 PM CDT 

 

                Body Mass Index 30.05           2020  2:50 PM CDT 



documented in this encounter



Progress Notes

Shravan Sidhu, [facility] - 2020  2:30 PM CDTFormatting of this note might 
be different from the original.

Chief complaint:

Chief Complaint

Patient presents with

 ROUTINE PRENATAL VISIT



HPI CC: Follow Up Prenatal Visit



Temi Cartagena is a 25 year old, , /White female. 
Patient's last menstrual period was 2020 (approximate).  She is 15w2d with
an intrauterine pregnancy.  Her estimated date of delivery is 2021, by 
Ultrasound.  She has no complaints today. She reports denies FM, contractions, 
LOF and bleeding today. Patient denies current or past physical, sexual or 
emotional abuse.



Histories

OB History

 Para Term  AB Living

3 2 2     2

SAB TAB Ectopic Multiple Live Births

        2



# Outcome Date GA Lbr Sánchez/2nd Weight Sex Delivery Anes PTL Lv

3 Current

2 Term 19 40w1d   F Vag-Spont   REINA

1 Term 10/23/16 41w0d   F VAGINAL   REINA



Past Medical History:

Diagnosis Date

 Anxiety

 no meds

 Atypical squamous cells of undetermined significance (ASCUS) on Papanicolaou
smear of cervix 2018

 BV (bacterial vaginosis) 3/9/2016

 Depression

 denies si/hi, no meds

 Genital herpes 

 Insomnia

 no meds

 Marijuana use 2018

 Ovarian cyst



Family History

Problem Relation Age of Onset

 Diabetes Maternal Grandmother

 Heart Maternal Grandmother

 High cholesterol Maternal Grandmother

 Hypertension Maternal Grandmother

 Depression Mother

 Cancer Mother

     Thyroid

 Anxiety Mother

 Cancer Sister

     cervical

 Arthritis NoFHx

 Asthma NoFHx

 Birth defects NoFHx

 Breast Cancer NoFHx

 Colon Cancer NoFHx

 Ovarian Cancer NoFHx

 Genetic NoFHx

 Mental retardation NoFHx

 Neurological NoFHx

 Osteoporosis NoFHx

 Psychiatry NoFHx

 Other - see comments NoFHx



Family Status

Relation Name Status

 MGMo  (Not Specified)

 Mo  (Not Specified)

 Sis  (Not Specified)

 NoFHx  (Not Specified)



No past surgical history on file.

Social History



Socioeconomic History

 Marital status: Single

  Spouse name: Not on file

 Number of children: Not on file

 Years of education: Not on file

 Highest education level: Not on file

Occupational History

 Not on file

Social Needs

 Financial resource strain: Not on file

 Food insecurity

  Worry: Not on file

  Inability: Not on file

 Transportation needs

  Medical: Not on file

  Non-medical: Not on file

Tobacco Use

 Smoking status: Former Smoker

 Smokeless tobacco: Never Used

 Tobacco comment: quit 2020 was smoking 1 pack per day

Substance and Sexual Activity

 Alcohol use: No

  Alcohol/week: 0.0 standard drinks

 Drug use: Yes

  Types: Marijuana

  Comment: 2018

 Sexual activity: Yes

  Partners: Male

  Comment: Last sexual intercourse 2020

Lifestyle

 Physical activity

  Days per week: Not on file

  Minutes per session: Not on file

 Stress: Not on file

Relationships

 Social connections

  Talks on phone: Not on file

  Gets together: Not on file

  Attends Congregation service: Not on file

  Active member of club or organization: Not on file

  Attends meetings of clubs or organizations: Not on file

  Relationship status: Not on file

 Intimate partner violence

  Fear of current or ex partner: Not on file

  Emotionally abused: Not on file

  Physically abused: Not on file

  Forced sexual activity: Not on file

Other Topics Concern

 Not on file

Social History Narrative

 Pt denies current or past physical, sexual or emotional abuse.

 Pt has no pets.

 Pt lives with her parents.



Social History



Substance and Sexual Activity

Sexual Activity Yes

 Partners: Male

 Comment: Last sexual intercourse 2020







Labs

Labs are pending.



Radiology

Radiology pending.



Allergies

Temi is allergic to reglan [metoclopramide hcl].



Medications

Temi has a current medication list which includes the following 
prescription(s): doxylamine-pyridoxine (vit b6), promethazine, prenatal vit 
33-iron-folic-dha, ondansetron, and promethazine.



Review of Systems

Eyes: Negative for visual disturbance.

Cardiovascular: Negative for leg swelling.

Gastrointestinal: Positive for nausea and vomiting. Negative for abdominal pain.

Genitourinary: Negative for vaginal bleeding, vaginal discharge and pelvic pain.

Neurological: Negative for headaches.



/71 (BP Location: Right arm, Patient Position: Sitting, BP CUFF SIZE: 
Adult Medium)  | Pulse 79  | Temp 36.6 C (97.9 F) (Oral)  | Resp 16  | Ht 5'
5" (1.651 m)  | Wt 180 lb 9.6 oz (81.9 kg)  | LMP 2020 (Approximate)  | 
BMI 30.05 kg/m

Pregravid BMI: 33.28



Physical Exam



PRENATAL PHYSICAL:

General Exam:

Neurological: Normal

Abdomen: Normal

Extremities: Normal



Pelvic Exam:

Uterus: 14cm  Weeks





Assessment/Plan

Supervision of high risk pregnancy, antepartum  (primary encounter diagnosis)

Multiparity

Supervision of high risk pregnancy, antepartum  (primary encounter diagnosis)

Comment: Routine Prenatal Visit

Plan: POCT URINALYSIS W SPECIFIC GRAVITY, CONSULT

      MATERNAL FETAL MEDICINE ULTRASOUND Preferred

      Location: [place], [facility], [facility]

       Denies zika virus risk, signs and symptoms such as fever,rash,joint pain,
conjunctivitis (redeyes), muscle pain, headaches; outside [place] travel to areas 
affected by zika, and FOB exposure to zika. Educated on use of mosquito 
repellent. Covid x12 screening done, screening results are negative.



Nausea and vomiting during pregnancy prior to 22 weeks gestation

Comment: patient use phenergan but is not helping, 10lb weight loss

Plan: doxylamine-pyridoxine, vit B6, (DICLEGIS) 10-10

      mg per tablet



Urinary tract infection without hematuria, site unspecified

Comment: treated on 2020

Plan: URINE CULTURE



Tobacco abuse

Comment: current smoker

Plan: smoking cessation discussed



Class 1 obesity with body mass index (BMI) of 31.0 to 31.9 in adult, unspecified
obesity type, unspecified whether serious comorbidity present

Comment: BMI: 30.05

Plan: Patient encouraged to limit weight gain and advised to eat healthy diet, 
fruits, vegetables, increased fiber and water intake and protein low in fat.  
Encouraged exercise for 30 min everyday; begin regimen with caution to prevent 
injury.  Encouraged to decrease BMI to &lt;25.



Return to clinic in 4 weeks.

Discussed treatment options.

Medications as ordered.

Reviewed patient instructions and provided printed copy.



This visit did not involve counseling and coordination that comprised more than 
50% of the visit time.

[facility]  2020  3:48 PM

Electronically signed by Shravan Sidhu FNP at 2020  3:49 PM CDT
documented in this encounter



Plan of Treatment







             Date         Type         Specialty    Care Team    Description

 

             2020   Technician [facility] 

 

             2020   Routine Prenatal Visit [facility] Shravan Sidhu FNP



                                        



                                                    [address], 

[address]



                                        



                                                                [phone] (Fax) 

 

             2020   Technician Visit Maternal Fetal              



                                       Medicine                  









             Name         Type         Priority     Associated Diagnoses Date/Ti

me

 

             URINE CULTURE LAB          Routine      Urinary tract infection wit

hout 2020  3:29 PM 

CDT



                                                    hematuria, site unspecified 

 

             [facility]AD S[facility]    LAB          Routine      Supervision of high risk   3:35 PM CDT



                                                    pregnancy, antepartum 









             Name         Type         Priority     Associated Diagnoses Order S

chedule

 

             QUAD S[facility]N    LAB          Routine      Supervision of high risk Exp

ected: 2020,



                                                    pregnancy, antepartum 

s: 2021









                Health Maintenance Due Date        Last Done       Comments

 

                HPV VACCINES (1 - 2-dose 2006                      



                series)                                         

 

                INFLUENZA VACCINE (#1) 2020,     



                                                10/18/2018,     



                                                2016      

 

                Depression Screening 2021,     



                                                2020      

 

                PAP SMEAR       2023,     



                                                2018      

 

                DTaP,Tdap,and Td Vaccines (3 2029,     



                - Td)                           2016      

 

                PNEUMOCOCCAL 0-64 YEARS Aged Out                        No longe

r eligible based



                COMBINED SERIES                                 on patient's age

 to



                                                                complete this to

pic



documented as of this encounter



Procedures







             Procedure Name Priority     Date/Time    Associated Diagnosis Comme

nts

 

             POCT URINALYSIS Routine      2020   Supervision of high risk 

Results for this



                                                    pregnancy, antepartum proced

ure are in the



                                                                 results section

.



documented in this encounter



Results

POCT URINALYSIS W SPECIFIC GRAVITY (2020)





             Component    Value        Ref Range    Performed At Pathologist Sig

nature

 

             POCT U SP GRAV .            1.005 - 1.025 mg/dl              

 

             POCT PH U    .            5 - 8 mg/dl               

 

             POCT U LEUK EST .            Negative - Negative              

 

             POCT U NIT   .            Negative - Negative              

 

             POCT U PROT  2+           Negative - Negative              

 

             POCT U GLU   normal       Negative - Negative              

 

             POCT U KETONE .            Negative - Negative              

 

             POCT U UROBILI .            0.2 - 1 mg/dl              

 

             POCT U BILI  .            Negative - Negative              

 

             POCT U BLD   .            Negative - Negative              

 

             POCT U COLOR                                        

 

             POCT U APPEAR                                        









                                        Specimen

 

                                        Urine - URINE, CLEAN CATCH



documented in this encounter



Visit Diagnoses







                                        Diagnosis

 

                                        Supervision of high risk pregnancy, ante

partum - Primary

 

                                        Multiparity

 

                                        History of herpes simplex type 2 infecti

on







                                        Personal history of other infectious and

 parasitic disease

 

                                        Nausea and vomiting during pregnancy cintia

or to 22 weeks gestation

 

                                        Urinary tract infection without hematuri

a, site unspecified

 

                                        Tobacco abuse







                                        Tobacco use disorder

 

                                        Class 1 obesity with body mass index (BM

I) of 31.0 to 31.9 in adult, unspecified



                                        obesity type, unspecified whether seriou

s comorbidity present



documented in this encounter



Insurance







          Payer     Benefit Plan / Subscriber ID Effective Phone     Address   T

[facility] [id number] 2020-Prese           [address]

aid



           HEALTH CHOICE - HEALTH CHOICE            nt                    [id number]

1



                                        



          [facility]   MEDICAID                                [place], [place] MEDICA[place]                                          [id number] 









           Guarantor Name Account Type Relation to Date of    Phone      Billing

 Address



                                 Patient    Birth                 

 

           Temi Cartagena Personal/Famil Self       1995 [phone]                                (Home)     [place]



                                                                  [id number]



documented as of this encounter



Advance Directives







                Name            Relationship    Healthcare Agent Communication



                                                Relationship    

 

                Andrew George Spouse          Health Care Agent [phone]



                                                                [phone])[phone] (Home)

 

                Vicky Cartagena  Mother          [facility] [phone]



                                                Agent           (Mobile)[phone] (Home)

## 2020-08-21 NOTE — XMS REPORT
Summary of Care

                             Created on:2020



Patient:Temi Cartagena

Sex:Female

:1995

External Reference #:[id number]





Demographics







                          Address                   [address]



                                                    [place] [address]

 

                          Mobile Phone              [phone]

 

                          Home Phone                [phone]

 

                          Email Address             [email or web address]

[email or web address]

 

                          Preferred Language        English

 

                          Marital Status            Single

 

                          Anabaptist Affiliation     Unknown

 

                          Race                      White

 

                          Ethnic Group               or 









Author







                          Organization              [facility]

 

                          Address                   [address] [place] [id number]









Support







                Name            Relationship    Address         Phone

 

                Andrew George Unavailable     [address] [phone]



                                                [place] [address] 

 

                Vicky Cartagena  Unavailable     [address] [phone]



                                                [place] [address] 









Care Team Providers







                    Name                Role                Phone

 

                    Pcp,  Does Not Have A Primary Care Provider [phone]









Reason for Visit







                          Reason                    Comments

 

                          Abnormal Lab              1hr ABN







Encounter Details







             Date         Type         Department   Care Team    Description

 

             2020   Telephone    [facility] ELISE- Shravan Sidhu Ab

normal Lab (1hr 

ABN)



                                                            [address] [address]



                                        



                                                [place]    [phone] [address] [phone] (Fax)        



                                       [phone]              







Allergies







             Active Allergy Reactions    Severity     Noted Date   Comments

 

             Metoclopramide Hcl Anxiety                   10/14/2018   



documented as of this encounter (statuses as of 2020)



Medications







          Medication Sig       Dispensed Refills   Start Date End Date  Status

 

          proMETHazine 25 mg Take 1 tablet by 30 tablet 0         2018    

       Active



          tabletIndications: mouth every 6                                      

   



          Nausea and vomiting (six) hours as                                    

     



          during pregnancy prior needed for Nausea                              

           



          to 22 weeks gestation and Vomiting                                    

     



                    (N/V).                                            

 

          ondansetron 4 mg Take 1 tablet by 20 tablet 0         2019      

     Active



          disintegrating mouth every 8                                         



          tabletIndications: (eight) hours as                                   

      



          Colitis   needed for Nausea                                         



                    and Vomiting                                         



                    (N/V).                                            

 

          prenatal vit Take 1 Packet by 30 Each   6         2020          

 Active



          33-iron-folic-dha mouth daily.                                        

 



          (SELECT-OB + DHA) 29 mg                                               

    



          iron-1 mg -250 mg combo                                               

    



          packIndications:                                                   



          Supervision of high                                                   



          risk pregnancy,                                                   



          antepartum                                                   



documented as of this encounter (statuses as of 2020)



Active Problems







                          Problem                   Noted Date

 

                          History of herpes simplex type 2 infection 2020

 

                          Tobacco abuse             2020

 

                          BMI 31.0-31.9,adult       2020

 

                          Glucose tolerance test abnormal 2019

 

                          12 weeks gestation of pregnancy 10/14/2018

 

                          Rubella non-immune status, antepartum 10/10/2018

 

                          Dyspepsia                 10/03/2018

 

                          Class 1 obesity with body mass index (BMI) of 31.0 to 

31.9 in adult, 2018



                          unspecified obesity type, unspecified whether serious 

comorbidity present 

 

                          UTI (urinary tract infection) during pregnancy 

018









                                        Overview: 







                                        TONIE at next visit -neg









                          Supervision of high risk pregnancy in third trimester 

2018

 

                          Multiparity               2018

 

                          Marijuana use             2018









                                        Overview: 







                                        Reports due to nausea









                          Ovarian cyst              2018









                                        Overview: 







                                        Per outside usg see external provided re

cords









                          Atypical squamous cells of undetermined significance (

ASCUS) on 2018



                          Papanicolaou smear of cervix 









                                        Overview: 







                                        Repeat pap in 12 months









                          History of depression     2018

 

                          History of anxiety        2016

 

                          Nausea and vomiting during pregnancy prior to 22 weeks

 gestation 2016

 

                          Supervision of high risk pregnancy, antepartum 

016









                    Pregnant            Estimated Date of Delivery Comments

 

                    Yes                 2021          Based on last menstr

ual period of 2020



                                                            (Approximate)



documented as of this encounter (statuses as of 2020)



Resolved Problems







                    Problem             Noted Date          Resolved Date

 

                    Hyperemesis         10/06/2018          10/09/2018

 

                    Dehydration         2018          10/07/2018

 

                    11 weeks gestation of pregnancy 2018          10/09/20

18

 

                    Well woman exam     2018

 

                    Contraceptive management 2018

 

                    Nexplanon removal   2018









                                        Overview: 







                                        Per patient reports was only for 2 years









                    History of depression 2016

 

                    UTI in pregnancy, antepartum 2016









                                        Overview: 







                                        Still current uti as of 5--16









                    BV (bacterial vaginosis) 2016



documented as of this encounter (statuses as of 2020)



Immunizations







                    Name                Administration Dates Next Due

 

                    Influenza Virus Vaccine Quad .5 mL IM 6+ MO 2019, 10/1

2018 

 

                    Influenza Virus Vaccine Quad IM 3+ YRS 2016          

 

                    TDAP                2019          

 

                    TDAP (ADACEL) VACCINE 2016          



documented as of this encounter



Social History







             Tobacco Use  Types        Packs/Day    Years Used   Date

 

             Former Smoker                                        









                Smokeless Tobacco: Never Used                                 









                                        Comments: quit 2020 was smoking 1 

pack per day









                Alcohol Use     Drinks/Week     oz/Week         Comments

 

                No              0 Standard drinks or equivalent 0.0             









                    Pregnant            Estimated Date of Delivery Comments

 

                    Yes                 2021          Based on last menstr

ual period of 2020



                                                            (Approximate)









                          Sex Assigned at Birth     Date Recorded

 

                          Not on file               









                    Job Start Date      Occupation          Industry

 

                    Not on file         Not on file         Not on file









                    Travel History      Travel Start        Travel End









                                        No recent travel history available.









                    COVID-19 Exposure   Response            Date Recorded

 

                    In the last month, have you been in contact with No / Unsure

         2020  9:52 AM 

CDT



                    someone who was confirmed or suspected to have              

       



                    Coronavirus / COVID-19?                     



documented as of this encounter



Last Filed Vital Signs

Not on filedocumented in this encounter



Plan of Treatment







             Date         Type         Specialty    Care Team    Description

 

             2020   Technician Visit Maternal Fetal              



                                       Medicine                  

 

             2020   Routine Prenatal Visit OB Satellites Shravan Sidhu, 



                                                                FNP



                                        



                                                    [address] [place], [place] [address]

[address]



                                        



                                                                [phone] (Fax) 









             Name         Type         Priority     Associated Diagnoses Order S

chedule

 

             3 HR GLUCOSE TOLERANCE LAB          Routine      Abnormal maternal 

glucose Expected: 

2020,



             PANEL                                  tolerance, antepartum 

s: 2021









                Health Maintenance Due Date        Last Done       Comments

 

                HPV VACCINES (1 - Female 2006                      



                2-dose series)                                  

 

                INFLUENZA VACCINE (#1) 2020,     



                                                10/18/2018,     



                                                2016      

 

                PAP SMEAR       2021      

 

                Depression Screening 2021,     



                                                2020      

 

                DTaP,Tdap,and Td Vaccines (3 2029,     



                - Td)                           2016      

 

                PNEUMOCOCCAL 0-64 YEARS Aged Out                        No longe

r eligible based



                COMBINED SERIES                                 on patient's age

 to



                                                                complete this to

pic



documented as of this encounter



Results

Not on filedocumented in this encounter



Visit Diagnoses







                                        Diagnosis

 

                                        Abnormal maternal glucose tolerance, ant

epartum - Primary



documented in this encounter



Insurance







          Payer     Benefit Plan / Subscriber ID Effective Dates Phone     Addre

ss   Type



                    Group                                             

 

          [employer] [place] [place] 2020-Present [phone] [address] 

  Medicaid



                      [place]                                       [id number] [place] [address] 



                                                            [id number] 



documented as of this encounter



Advance Directives







                Name            Relationship    Healthcare Agent Communication



                                                Relationship    

 

                Andrew George Spouse          Primary healthcare agent [phone]



                                                                (Mobile)[phone] (Home)

 

                Vicky Cartagena  Mother          [facility] [place] [id number]





                                                agent           (Mobile)[phone] (Home)

## 2020-08-21 NOTE — XMS REPORT
Summary of Care

                             Created on:2020



Patient:Temi Cartagena

Sex:Female

:1995

External Reference #:[id number]





Demographics







                          Address                   [address]



                                                    [place] [address]

 

                          Mobile Phone              [phone]

 

                          Home Phone                [phone]

 

                          Email Address             [email or web address]



 

                          Preferred Language        English

 

                          Marital Status            Single

 

                          Anglican Affiliation     Unknown

 

                          Race                      White

 

                          Ethnic Group               or 









Author







                          Organization              [facility]

 

                          Address                   [address] [place] [address]









Support







                Name            Relationship    Address         Phone

 

                Andrew George Unavailable     [address] [phone]



                                                [place] [address] 

 

                Vicky Cartagena  Unavailable     [address] [phone]



                                                [place] [address] 









Care Team Providers







                    Name                Role                Phone

 

                    Pcp,  Does Not Have A Primary Care Provider [phone]









Reason for Referral

 (Routine)





           Status     Reason     Specialty  Diagnoses / Referred By Referred To



                                            Procedures Contact    Contact

 

                Authorized                      Maternal Fetal  Diagnoses



Supervision of high risk pregnancy, antepartum Shravan Sidhu          



                                                Medicine        



Procedures



CONSULT MATERNAL FETAL MEDICINE ULTRASOUND Preferred Location: [facility]



                                        



                                                       [address] [place], [place] 



                                                                 [address]



                                        



                                                       Phone:     



                                                                 [phone]



                                        



                                                       Fax:       



                                                       [phone] 









Reason for Visit







                          Reason                    Comments

 

                          Initial Prenatal Visit    







Encounter Details







             Date         Type         Department   Care Team    Description

 

             2020   Initial Prenatal [facility] Shravan Sidhu

upervision of 

high risk pregnancy, antepartum (Primary Dx);



                                        Visit               [facility]



                                        Multiparity;



                                       [address] [facility] [address] [facility]  History o

f herpes simplex type 2 infection;



                                                            Street



                                        [place]



                                        Nausea and vomiting during pregnancy cintia

or to 22 weeks gestation;



                                       [place], [place] History of depr

ession;



                                                            [id number]



                                        Tobacco abuse;



                                                [phone]



                                        Class 1 obesity with body mass index (BM

I) of 31.0 to 31.9 in adult, 

unspecified obesity type, unspecified whether serious comorbidity present;



                                                    [phone] BMI 31.0-31.9,a

dult



                                                    (Fax)        







Allergies







             Active Allergy Reactions    Severity     Noted Date   Comments

 

             Metoclopramide Hcl Anxiety                   10/14/2018   



documented as of this encounter (statuses as of 2020)



Medications







          Medication Sig       Dispensed Refills   Start     End       Status



                                                  Date      Date      

 

          proMETHazine 25 mg Take 1 tablet 30 tablet 0         20         

   Active



          tabletIndications: by mouth every                     18              

    



          Nausea and vomiting 6 (six) hours                                     

    



          during pregnancy as needed for                                        

 



          prior to 22 weeks Nausea and                                         



          gestation Vomiting (N/V).                                         

 

          ondansetron 4 mg Take 1 tablet 20 tablet 0         20           

 Active



          disintegrating by mouth every                     19                  



          tabletIndications: 8 (eight) hours                                    

     



          Colitis   as needed for                                         



                    Nausea and                                         



                    Vomiting (N/V).                                         

 

          prenatal vit Take 1 Packet 30 Each   6         20            Act

baltazar



          33-iron-folic-dha by mouth daily.                     20              

    



          (SELECT-OB + DHA) 29                                                  

 



          mg iron-1 mg -250 mg                                                  

 



          combo                                                       



          packIndications:                                                   



          Supervision of high                                                   



          risk pregnancy,                                                   



          antepartum                                                   

 

          proMETHazine 25 mg Insert 1  4 Suppository 1         20

   Discontinued



          suppository Suppository                     18        020       



                    into rectum                                         



                    every 4 (four)                                         



                    hours as needed                                         



                    for Nausea and                                         



                    Vomiting (N/V).                                         

 

          famotidine 20 mg Take 1 tablet 60 tablet 3         10/03/20  07/01/2  

 Discontinued



          tablet    by mouth 2                     18        020       



                    (two) times                                         



                    daily.                                            

 

          proCHLORperazine Insert 1  12 Suppository 0         10/14/20  07/01/2 

  Discontinued



          (COMPAZINE) 25 mg Suppository                     18        020       



          suppository into rectum                                         



                    every 12                                          



                    (twelve) hours                                         



                    as needed for                                         



                    Nausea and                                         



                    Vomiting (N/V).                                         

 

          proCHLORperazine 10 Take 1 tablet 30 tablet 3         10/15/20  07/01/

2   Discontinued



          mg tablet by mouth every                     18        020       



                    6 (six) hours                                         



                    as needed for                                         



                    Nausea and                                         



                    Vomiting (N/V).                                         

 

          doxylamine-pyridoxin Take 4 tablets 120 tablet 1         10/15/20  07/

01/2   Discontinued



          e, vit B6, by mouth at                     18        020       



          (DICLEGIS) 10-10 mg bedtime. Take 1                                   

      



          per       tab in the                                         



          tabletIndications: morning, 1 tab                                     

    



          Nausea and vomiting with lunch and                                    

     



          during pregnancy 2 tabs at                                         



          prior to 22 weeks bedtime.                                          



          gestation                                                   

 

          ciprofloxacin HCl Take 1 tablet 20 tablet 0         20 

  Discontinued



          500 mg    by mouth 2                             020       



          tabletIndications: (two) times                                        

 



          Colitis   daily.                                            

 

          metroNIDAZOLE 500 mg Take 1 tablet 14 tablet 0         20   Discontinued



          tabletIndications: by mouth 2                     19        020       



          Colitis   (two) times                                         



                    daily.                                            



documented as of this encounter (statuses as of 2020)



Active Problems







                          Problem                   Noted Date

 

                          History of herpes simplex type 2 infection 2020

 

                          Tobacco abuse             2020

 

                          BMI 31.0-31.9,adult       2020

 

                          Glucose tolerance test abnormal 2019

 

                          12 weeks gestation of pregnancy 10/14/2018

 

                          Rubella non-immune status, antepartum 10/10/2018

 

                          Dyspepsia                 10/03/2018

 

                          Class 1 obesity with body mass index (BMI) of 31.0 to 

31.9 in adult, 2018



                          unspecified obesity type, unspecified whether serious 

comorbidity present 

 

                          UTI (urinary tract infection) during pregnancy 

018









                                        Overview: 







                                        TONIE at next visit -neg









                          Supervision of high risk pregnancy in third trimester 

2018

 

                          Multiparity               2018

 

                          Marijuana use             2018









                                        Overview: 







                                        Reports due to nausea









                          Ovarian cyst              2018









                                        Overview: 







                                        Per outside [facility] see external provided re

cords









                          Atypical squamous cells of undetermined significance (

ASCUS) on 2018



                          Papanicolaou smear of cervix 









                                        Overview: 







                                        Repeat pap in 12 months









                          History of depression     2018

 

                          History of anxiety        2016

 

                          Nausea and vomiting during pregnancy prior to 22 weeks

 gestation 2016

 

                          Supervision of high risk pregnancy, antepartum 

016









                    Pregnant            Estimated Date of Delivery Comments

 

                    Yes                 2021          Based on last menstr

ual period of 2020



                                                            (Approximate)



documented as of this encounter (statuses as of 2020)



Resolved Problems







                    Problem             Noted Date          Resolved Date

 

                    Hyperemesis         10/06/2018          10/09/2018

 

                    Dehydration         2018          10/07/2018

 

                    11 weeks gestation of pregnancy 2018          10/09/20

18

 

                    Well woman exam     2018

 

                    Contraceptive management 2018

 

                    Nexplanon removal   2018









                                        Overview: 







                                        Per patient reports was only for 2 years









                    History of depression 2016

 

                    UTI in pregnancy, antepartum 2016









                                        Overview: 







                                        Still current uti as of 5-6-16









                    BV (bacterial vaginosis) 2016



documented as of this encounter (statuses as of 2020)



Immunizations







                    Name                Administration Dates Next Due

 

                    Influenza Virus Vaccine Quad .5 mL IM 6+ MO 2019, 10/1

2018 

 

                    Influenza Virus Vaccine Quad IM 3+ YRS 2016          

 

                    TDAP                2019          

 

                    TDAP (ADACEL) VACCINE 2016          



documented as of this encounter



Social History







             Tobacco Use  Types        Packs/Day    Years Used   Date

 

             Former Smoker                                        









                Smokeless Tobacco: Never Used                                 









                                        Comments: quit 2020 was smoking 1 

pack per day









                Alcohol Use     Drinks/Week     oz/Week         Comments

 

                No              0 Standard drinks or equivalent 0.0             









                    Pregnant            Estimated Date of Delivery Comments

 

                    Yes                 2021          Based on last menstr

ual period of 2020



                                                            (Approximate)









                          Sex Assigned at Birth     Date Recorded

 

                          Not on file               









                    Job Start Date      Occupation          Industry

 

                    Not on file         Not on file         Not on file









                    Travel History      Travel Start        Travel End









                                        No recent travel history available.









                    COVID-19 Exposure   Response            Date Recorded

 

                    In the last month, have you been in contact with No / Unsure

         2020  9:52 AM 

CDT



                    someone who was confirmed or suspected to have              

       



                    Coronavirus / COVID-19?                     



documented as of this encounter



Last Filed Vital Signs







                Vital Sign      Reading         Time Taken      Comments

 

                Blood Pressure  113/77          2020  9:53 AM CDT 

 

                Pulse           76              2020  9:53 AM CDT 

 

                Temperature     36.3 C (97.4 F) 2020  9:53 AM CDT 

 

                Respiratory Rate 16              2020  9:53 AM CDT 

 

                Oxygen Saturation -               -               

 

                Inhaled Oxygen Concentration -               -               

 

                Weight          86.4 kg (190 lb 8 oz) 2020  9:53 AM CDT 

 

                Height          165.1 cm (5' 5") 2020  9:53 AM CDT 

 

                Body Mass Index 31.7            2020  9:53 AM CDT 



documented in this encounter



Patient Instructions

Patient InstructionsMartin Greer RN - 2020  9:45 AM CDTFormatting of
this note might be different from the original.



Patient Education



Prevention Guidelines,Women Ages 18 to 39

Screening tests and vaccines are an important part of managing your health. A 
screening test is doneto find possible disorders or diseases in people who don't
have any symptoms. The goal is to find a disease early so lifestyle changes can 
be made and you can be watched more closely to reduce the riskof disease, or to 
detect it early enough to treat it most effectively. Screening tests are not 
considered diagnostic, but are used to determine if more testing is needed. 
Health counseling is essential, too. Below are guidelines for these, for women 
ages 18 to 39. Talk with your healthcare provider tomake sure youre 
up-to-date on what you need.

Screening Who needs it How often

Alcohol misuse All women in this age group At routine exams

Blood pressure All women in this age group Yearly checkup if your blood pressure
is normal

Normal blood pressure is less than 120/80 mm Hg

If your blood pressure reading is higher than normal, follow the advice of your 
healthcare provider

Breast cancer All women in this age group should talk with their healthcare 
providers about the needfor clinical breast exams (CBE)1 Clinical breast exam 
every 3 years1

Cervical cancer Women ages 21 and older Women between ages 21 and 29 should have
a Pap test every 3 years; women between ages 30 and 65 are advised to have a Pap
test plus an HPV test every 5 years

Chlamydia Sexually active women ages 25 and younger, and women at increased risk
for infection (suchas having multiple sex partners) Every year if you're at risk
or have symptoms

Depression All women in this age group At routine exams

Type 2 diabetes, prediabetes All women with no symptoms who are overweight or 
obese and have 1 or more other risk factors for diabetes At least every 3 years.
Also, testing for diabetes during pregnancy after the 24th week.

Type 2 diabetes, prediabetes All women diagnosed with gestational diabetes 
Lifelong testing every 3 years

Type 2 diabetes All women with prediabetes Every year

Gonorrhea Sexually active women at increased risk for infection At routine exams

Hepatitis C Anyone at increased risk At routine exams

HIV All women should be tested at least once for HIV between the ages of 13 and 
64 At routine exams.Those with risk factors for HIV should be tested at least 
annually.

Obesity All women in this age group At routine exams

Syphilis Women at increased risk for infection should talk with their healthcare
provider At routineexams

Tuberculosis Women at increased risk for infection should talk with their 
healthcare provider Ask your healthcare provider

Vision All women in this age group At least 1 complete exam in your 20s, and 2 
in your 30s

Vaccine2 Who needs it How often

Chickenpox (varicella) All women in this age group who have no record of this 
infection or vaccine 2doses; the second dose should be given 4 to 8 weeks after 
the first dose

Hepatitis A Women at increased risk for infection should talk with their 
healthcare provider 2 dosesgiven at least 6 months apart

Hepatitis B Women at increased risk for infection should talk with their 
healthcare provider 3 dosesover 6 months; second dose should be given 1 month 
after the first dose; the third dose should be given at least 2 months after the
second dose and at least 4 months after the first dose

Haemophilus influenzaeType B (HIB) Women at increased risk for infection 
should talk with their healthcare provider 1 to 3 doses

Human papillomavirus (HPV) All women in this age group up to age 26 3 doses; the
second dose should be given 1 to 2 months after the first dose and the third 
dose given 6 months after the first dose

Influenza (flu) All women in this age group Once a year

Measles, mumps, rubella (MMR) All women in this age group who have no record of 
these infections or vaccines 1 or 2 doses

Meningococcal Women at increased risk for infection should talk with their 
healthcare provider 1 or more doses

Pneumococcal conjugate vaccine (PCV13)and pneumococcal 
polysaccharidevaccine(PPSV23) Women at increased risk for infection should 
talk with their healthcare provider PCV13: 1 dose ages 19 to 65 (protects 
against 13 types of pneumococcal bacteria)

PPSV23: 1 to2 doses through age 64, or 1 dose at 65 or older (protects against
23 types of pneumococcal bacteria)



Tetanus/diphtheria/pertussis (Td/Tdap) booster All women in this age group Td 
every 10 years, or a one-time dose of Tdap instead of a Td booster after age 18,
then Td every 10 years

Counseling Who needs it How often

BRCA gene mutation testing for breast and ovarian cancer susceptibility Women 
with increased risk for having gene mutation When your risk is known

Breast cancer and chemoprevention Women at high risk for breast cancer When your
risk is known

Diet and exercise Women who are overweight or obese When diagnosed, and then at 
routine exams

Domestic violence Women at the age in which they are able to have children At 
routine exams

Sexually transmitted infection prevention Women who are sexually active At 
routine exams

Skin cancer Prevention of skin cancer in fair-skinned adults At routine exams

Use of tobacco and the health effects it can cause All women in this age group 
Every visit

1 According to the ACS, women ages 20 to 39 years should have a clinical breast 
exam (CBE) as part of their routine health exam every 3 years. Breast self-exams
are an option for women starting in their 20s.But the USPSTF does not 
recommend CBE.

[employer] last reviewed this educational content on 10/1/2017

 2205-5966 [employer]. [address] [place] [address] 
[address]. All rights reserved. This information is not intended as a substitute for
professional medical care. Always follow your healthcare professional's 
instructions.







Patient Education



Understanding STIs



When it comes to sex, nothing is risk-free. Any sexual contact with the penis, 
vagina, anus, or mouth can spread a sexually transmitted infection (STI). These 
include chlamydia, gonorrhea, herpes, HIV,and genital warts. STIs are also known
as sexually transmitted diseases (STDs). The only sure way toprevent STIs is not
having sex (abstinence). But there are ways to make sex safer. Use a latex 
condom each time you have sex. And choose your partner wisely.

Use condoms for safer sex

If you have sex, latex condoms provide the best protection against STIs. Latex 
condoms stop the exchange of body fluids that carry certain STIs. They also 
limit contact with affected skin. Be aware that a condom doesnt cover all 
skin. So affected skin that isn't covered can still transfer disease. But 
youre safer with a condom than without one. Use a condom even if you use 
other birth control. Birth control methods such as the pill or IUD help prevent 
pregnancy, but they don't protect against STIs.

Choose the right condom

Condoms made of latex prevent disease best. If youre allergic to latex, use 
polyurethane condoms instead. Male condoms fit over the penis. Female condoms 
line the vagina. Before buying a condom, read the label to be sure it prevents 
disease. Some novelty condoms dont.

The right lubricant helps

Buy lubricated condoms or use lubricant. This provides greater comfort and 
reduces the risk for condom breakage. Use only water-based lubricants. Dont 
use oil, lotion, or petroleum jelly. They can weaken the condom, causing 
breakage. Also, you may want to choose lubricants without nonoxynol-9. This
spermicide may cause irritation. It can raise the risk for certain STIs.

Use condoms correctly

For condoms to work, they must be used the right way. Keep these tips in mind:

 Use a new latex condom each time you have sex. Slip the condom on the penis 
before any contact ismade.

 When ready to withdraw, hold the rim of the condom as the penis pulls out. 
This prevents the condom from slipping off.

 Check the expiration date before using a condom.

 Dont store condoms in places that can get hot, such as a car or a wallet 
that is carried in a back pocket.

Get to know your partner

Safer sex is a process. It involves getting to know your partner and making 
informed choices. Ask each other how many partners you have had in the past, and
how many you have now. Find out if either ofyou has HIV or any other STI. If you
decide to have sex, use a condom each time. Dont stop using condoms unless 
youre sure neither of you has other partners and youve both been tested to
confirm you dont have HIV or other STIs. Then stay free of disease by having 
sex only with each other (monogamy).

Keep your cool

Dont let alcohol or drugs cloud your judgment. They could lead you to have 
sex with someone you wouldnt have chosen if you were sober. Or you might 
forget to use a condom. If you do plan to have sex, keep a latex condom with 
you. Dont wait until youre in the heat of passion to try to find one.

Consider abstinence

The only way to be sure you wont get an STI is to abstain from sex. 
Abstinence is a choice that many people make at some point in their life. Maybe 
you want to wait until you are sure youre readybefore you have sex. Maybe 
youd like a break from the responsibilities of sex for a while. Or maybe you 
just want to know your partner better before taking the next step. Abstinence is
a choice you can make now to protect your future.

[employer] last reviewed this educational content on 2018-2020 [employer]. [address] [place] [address], [place] 
[address]. All rights reserved. This information is not intended as a substitute for
professional medical care. Always follow your healthcare professional's 
instructions.







Patient Education



Understanding HIV and AIDS

It's important to know how HIV can get into your body and what happens once 
its there. Then youll be better prepared to protect yourself or others 
against this virus. A person with HIV can look and feel perfectly healthy. But 
that person can give HIV to others as soon as he or she is infected with the 
virus.

Having unsafe or unprotected sex or sharing needles puts you at risk for HIV. 
Talk with your healthcare provider about ways to protect yourself or a loved one
from getting HIV.

How HIV infection progresses

After HIV enters the body, it attacks the immune system in the stages below. A 
person with HIV can infect others once the virus gets into the blood.

HIV with no symptoms. A person with HIV may have no symptoms for years. The only
sign of infection may be a positive blood test for HIV 2 weeks to 3 months or 
later after HIV enters the body.

HIV with symptoms. Some people develop an illness similar to mono 
(mononucleosis) 2 to 4 weeks after the virus enters the body. This is called 
acute retroviral syndrome. Symptoms may include swollen lymph glands, chills, 
fever, night sweats, weakness, weight loss, skin rashes, mouth ulcers, or sore t
hroat. Symptoms may be mild or the person can feel quite sick. Even without 
treatment the symptoms almost always go away in a few days or up to 2 to 3 
weeks. Then the person has no symptoms, often for years. But over time the 
immune system starts to get weaker and symptoms start appearing. People at this 
stage may have a yeast infection in the mouth (oral thrush), shingles, skin 
problems, pneumonia, diarrhea that keeps coming back, or weight loss.

AIDS. AIDS is the most advanced stage of HIV infection, when the immune system 
is severely weakened.Certain rare diseases and cancers that normally would not 
occur, now can occur because the body can no longer fight them well enough. It 
is often these diseases that cause death in people with AIDS. HIV may also 
directly attack the brain and nervous system. This causes seizures and loss of 
memory and body movement. It also affects many other parts of the body. This 
leads to problems such as anemia, low white blood cell count, diarrhea, belly 
pain, skin problems, and many others.



How HIV enters the body

HIV is carried in semen, vaginal fluid, blood, and breastmilk.

 During sex, HIV can enter the body. It gets in through the fragile tissue and
linings, sores, or cuts in or around the vagina, penis, anus, and mouth.

 During drug use, tattooing, or body piercing, the virus can enter the blood 
through an infected needle.

 A mother who has HIV can infect her child during pregnancy, childbirth, and 
breastfeeding.

StayWell last reviewed this educational content on 2019-2020 [employer]. [address] [place] [address], [place] 
[address]. All rights reserved. This information is not intended as a substitute for
professional medical care. Always follow your healthcare professional's 
instructions.







Patient Education



Clinical Breast Exam



Many health organizations recommend a yearly clinical breast exam. This exam may
be done by a gynecologist, family healthcare provider, nurse practitioner, nurse
midwife, or specially trained nurse. Yearly breast exams help tomake 
surethat breast conditions are found early.

Your healthcare providers role

A healthcare professional knows the tests and follow-up care needed if a problem
is found. Your clinical exam is also a great time to ask questions about breast 
self-exams. You can find out if yourechecking your breasts in the best way. 
Or you may want to ask how pregnancy, breast implants, or breast reduction 
surgery affect the way you should check your breasts.

Diagnostic tests

If a clinical exam reveals a breast change, you may have other tests to find out
more. These tests may include:

 Mammography. A low-dose X-ray of your breast tissue.

 Ultrasound. An imaging test that uses sound waves to create images of your 
breast.

 Biopsy. A small amount of breast tissue is removed by needle or by a cut 
(incision). The tissue is then checked under a microscope.

Guidelines for having clinical breast exams

The American College of Obstetricians and Gynecologists recommends that starting
at age 29, you should have a clinical breast exam every 1 to 3 years. After age 
40, have a clinical breast exam each year. If youre at higher risk for breast
cancer, you may need exams more often. Risk factors for breast cancer may 
include:

 Being over 50 or postmenopausal

 Having a family history of breast cancer

 Having the BRCA1 or BRCA2 gene mutation or certain other gene mutations

 Having more menstrual periods due to starting menstruation early(before age
12) or having a late menopause (after age 55)

 Having no pregnancies

 Having a first pregnancy after age 30

 Being obese

 Having a history of radiation treatment to your chest area

 Exposure to REYNOLD during your mother's pregnancy

 Not being active

 Drinking too much alcohol

 Having dense breast tissue

 Taking hormone therapy after menopause

Other health organizations have different recommendations. Talk with your 
healthcare provider about what is best for you.

StayWell last reviewed this educational content on 2017-2020 [employer]. [address] [place] [address], [place] 
[address]. All rights reserved. This information is not intended as a substitute for
professional medical care. Always follow your healthcare professional's 
instructions.







Patient Education



Breast Health: Breast Self-Awareness

What is breast self-awareness?

Breast self-awareness is knowing how your breasts normally look and feel. Your 
breasts change as yougo through different stages of your life. So its 
important to learn what is normal for your breasts. Knowing about your breasts 
helps you spot any changes in them right away. Tell your healthcare provider 
about any changes.

Why is breast self-awareness important?

Many experts now say that women should focus on breast self-awareness instead of
doing a breast self-examination (BSE). These experts include the American Cancer
Society and the American Congress of Obstetricians and Gynecologists. Some 
experts even advise not teaching women to do a BSE. Thats because research 
hasnt shown a clear benefit to doing BSEs.

Breast self-awareness is different than a BSE. It isnt about following a 
certain method and schedule. Its about knowing what's normal for your 
breasts. That way you can spot even small changes right away. If you see any 
changes, tell your healthcare provider.

Changes to look for

Call your healthcare provider if you find any changes in your breasts that worry
you. These changes may be:

 A lump

 Nipple discharge other than breastmilk, especially if it's bloody

 Swelling

 A change in size or shape

 Skin changes, such as redness, thickening, or dimpling of the skin

 Swollen lymph nodes in the armpit

 Nipple problems, such as pain or redness

If you find a lump

Call your provider if you find lumpiness in one breast. Also call if you feel 
something different inthe tissue or feel a definite lump. Sometimes lumpiness 
may be due to menstrual changes. But there may be reason for concern.

Your provider may want to see you right away if you have:

 Nipple discharge that is bloody

 Skin changes on your breast, such as dimpling or puckering

Its okay to be upset if you find a lump. Be sure to call your provider right 
away. Remember that most breast lumps are benign. This means they are not 
cancer.

[employer] last reviewed this educational content on 2017-2020 [employer]. [address] [place] [address] 
[address]. All rights reserved. This information is not intended as a substitute for
professional medical care. Always follow your healthcare professional's 
instructions.







Patient Education



Understanding USDA MyPlate

The USDA (U.S. Department of Agriculture) has guidelines to help you make 
healthy food choices. These are called MyPlate. MyPlate shows the food groups 
that make up healthy meals using the image of a place setting. Before you eat, 
think about the healthiest choices for what to put onto your plate or into your 
cup or bowl. To learn more about building a healthy plate, visit 
www.choosemyplate.gov.



The food groups

 Fruits. Any fruit or 100% fruit juice counts as part of the Fruit Group. 
Fruits may be fresh, canned, frozen, or dried, and may be whole, cut-up, or 
pureed. Make half your plate fruits and vegetables.

 Vegetables. Any vegetable or 100% vegetable juice counts as a member of the 
Vegetable Group. Vegetables may be fresh, frozen, canned, or dried. They can be 
served raw or cooked and may be whole, cut-up, or mashed. Make half your plate 
fruits and vegetables.

 Grains. All foods made from grains are part of the Grains Group. These 
include wheat, rice, oats,cornmeal, and barley such as bread, pasta, oatmeal, 
cereal, tortillas, and grits. Grains should be no more than a quarter of your 
plate. At least half of your grains should be whole grains.

 Protein. This group includes meat, poultry, seafood, beans and peas, eggs, 
processed soy products(like tofu), nuts (including nut butters), and seeds. Make
protein choices no more than a quarter ofyour plate. Meat and poultry choices 
should be lean or low fat.

 Dairy. All fluid milk products and foods made from milk that contain calcium,
like yogurt and cheese, are part of the Dairy Group. (Foods that have little 
calcium, such as cream, butter, and cream cheese, are not part of the group.) 
Most dairy choices should be low-fat or fat-free.

 Oils. These are fats that are liquid at room temperature. They include 
canola, corn, olive, soybean, and sunflower oil. Foods that are mainly oil 
include mayonnaise, certain salad dressings, and soft margarines. You should 
have only 5 to 7 teaspoons of oils a day. You probably already get this muchfrom
the food you eat.

[employer]ive last reviewed this educational content on 2017-2020 [employer]. [address] [place] [address] 
[address]. All rights reserved. This information is not intended as a substitute for
professional medical care. Always follow your healthcare professional's 
instructions.







Patient Education



The Range of Pap Test Results

When your Pap test is sent to the lab, the lab studies your cell samples and 
reports any abnormal cell changes. Your healthcare provider can discuss these 
changes with you. In some cases, an abnormal Pap test is due to an infection. 
More serious cell changes range from dysplasia to cancer. Talk to your 
healthcare provider about your Pap test.



Normal results

Cervical cells, even normal ones, are always changing. As they mature, normal 
squamous cells move from deeper layers within the cervix. Over time, these cells
flatten and cover the surface of the cervix. Within the cervical canal, the 
cells are different. These glandular cells are taller and not as flat as the 
cells on the surface of the cervix. When a Pap test sample shows healthy cells 
of both types, the results are negative. Keep having Pap tests as often as 
directed.

Abnormal results

A positive Pap test result means some cells in the sample showed abnormal 
changes. These results aregrouped by the type of cell change and the location, 
or extent, of the changes. Depending on the results, you may need further 
testing.

 Inflammation. Noncancerous changes are present. They may be due to normal 
cell repair. Or, they may be caused by an infection, such as HPV or yeast. 
Further testing may be needed. (Also called reactive cellular changes.)

 Atypical squamous cells. Test results are unclear. Cells on the surface of 
the cervix show changes, but their significance is not yet known. Testing for 
HPV and other sexually transmitted infections(STIs) may be needed. Treatment may
be required. (Reported as ASC-US or ASC-H.)

 Atypical glandular cells. Cells lining the cervical canal show abnormal 
changes. Further testing is likely. You may also have treatment to destroy or 
remove problem cells. (Reported as AGC.)

 Mild dysplasia. Cells show distinct changes. More testing or HPV typing may 
be done. You may alsohave treatment to destroy or remove problem cells. 
(Reported as low-grade JARRETT or VERONICA 1.)

 Moderate to severe dysplasia. Cells show precancerous changes. Or, 
noninvasive cancer (carcinoma in situ) may be present. Treatment to destroy or 
remove problem cells is likely. (Reported as high-grade JARRETT or VERONICA 2 or VERONICA 3.)

 Cancer. Different types of cancer may be detected by your Pap test. More 
tests to assess the cancer's extent are likely. The type of treatment will 
depend on the test results and other factors, suchas age and health history. 
(Reported as squamous cell carcinoma, endocervical adenocarcinoma in situ, or 
adenocarcinoma.)

[employer]Hive last reviewed this educational content on 2017-2020 [employer]. [address] [place] [address], [place] [address]. All rights reserved. This information is not intended as a substitute for
professional medical care. Always follow your healthcare professional's 
instructions.





Electronically signed by Martin Greer RN at 2020  9:55 AM CDT

documented in this encounter



Progress Notes

Shravan Sidhu, RAYP - 2020  9:45 AM CDTFormatting of this note might 
be different from the original.

Chief complaint:

Chief Complaint

Patient presents with

 Initial Prenatal Visit



HPI

.CC: Initial Prenatal Visit



Temi Cartagena is a 25 year old, , /White female. 
Patient's last menstrual period was 2020 (approximate).  She is 10w2d with
an intrauterine pregnancy. Her Estimated Date of Delivery: 21.  She is 
being seen today for her first obstetrical visit. Patient complains of nausea 
and vomiting since finding out of pregnancy, but was placed on medication at ER 
visit  Patient is unsure of LMP but know it was before 2020. Patient 
reports H/O Depression no medication dn denies nay symptoms today. She reports 
+FM and denies contractions, LOF and bleeding today.Patient denies current or 
past physical, sexual or emotional abuse.



OB History

 Para Term  AB Living

3 2 2     2

SAB TAB Ectopic Multiple Live Births

        2



# Outcome Date GA Lbr Sánchez/2nd Weight Sex Delivery Anes PTL Lv

3 Current

2 Term 19 40w1d   F Vag-Spont   REINA

1 Term 10/23/16 41w0d   F VAGINAL   REINA



Histories

OB History

 Para Term  AB Living

3 2 2     2

SAB TAB Ectopic Multiple Live Births

        2



# Outcome Date GA Lbr Sánchez/2nd Weight Sex Delivery Anes PTL Lv

3 Current

2 Term 19 40w1d   F Vag-Spont   REINA

1 Term 10/23/16 41w0d   F VAGINAL   REINA



Past Medical History:

Diagnosis Date

 Anxiety

 no meds

 Atypical squamous cells of undetermined significance (ASCUS) on Papanicolaou
smear of cervix 2018

 BV (bacterial vaginosis) 3/9/2016

 Depression

 denies si/hi, no meds

 Genital herpes 

 Insomnia

 no meds

 Marijuana use 2018

 Ovarian cyst



Family History

Problem Relation Age of Onset

 Diabetes Maternal Grandmother

 Heart Maternal Grandmother

 High cholesterol Maternal Grandmother

 Hypertension Maternal Grandmother

 Depression Mother

 Cancer Mother

     Thyroid

 Anxiety Mother

 Cancer Sister

     cervical

 Arthritis NoFHx

 Asthma NoFHx

 Birth defects NoFHx

 Breast Cancer NoFHx

 Colon Cancer NoFHx

 Ovarian Cancer NoFHx

 Genetic NoFHx

 Mental retardation NoFHx

 Neurological NoFHx

 Osteoporosis NoFHx

 Psychiatry NoFHx

 Other - see comments NoFHx



Family Status

Relation Name Status

 MGMo  (Not Specified)

 Mo  (Not Specified)

 Sis  (Not Specified)

 NoFHx  (Not Specified)



History reviewed. No pertinent surgical history.

Social History



Socioeconomic History

 Marital status: Single

  Spouse name: Not on file

 Number of children: Not on file

 Years of education: Not on file

 Highest education level: Not on file

Occupational History

 Not on file

Social Needs

 Financial resource strain: Not on file

 Food insecurity:

  Worry: Not on file

  Inability: Not on file

 Transportation needs:

  Medical: Not on file

  Non-medical: Not on file

Tobacco Use

 Smoking status: Former Smoker

 Smokeless tobacco: Never Used

 Tobacco comment: quit 2020 was smoking 1 pack per day

Substance and Sexual Activity

 Alcohol use: No

  Alcohol/week: 0.0 standard drinks

 Drug use: Yes

  Types: Marijuana

  Comment: 2018

 Sexual activity: Yes

  Partners: Male

  Comment: Last sexual intercourse 2020

Lifestyle

 Physical activity:

  Days per week: Not on file

  Minutes per session: Not on file

 Stress: Not on file

Relationships

 Social connections:

  Talks on phone: Not on file

  Gets together: Not on file

  Attends Taoism service: Not on file

  Active member of club or organization: Not on file

  Attends meetings of clubs or organizations: Not on file

  Relationship status: Not on file

 Intimate partner violence:

  Fear of current or ex partner: Not on file

  Emotionally abused: Not on file

  Physically abused: Not on file

  Forced sexual activity: Not on file

Other Topics Concern

 Not on file

Social History Narrative

 Pt denies current or past physical, sexual or emotional abuse.

 Pt has no pets.

 Pt lives with her parents.



Social History



Substance and Sexual Activity

Sexual Activity Yes

 Partners: Male

 Comment: Last sexual intercourse 2020



Genetic Screen

Autism / Mental Retardation: No

Canavan Disease: No

Congenital Heart Defect: No

Cystic Fibrosis: No

Down Syndrome: No

Familial Dysautonomia: No

Hemophilia or other Blood Disorders: No

[place] Chorea: No

Maternal Metabolic Disorder--specify (eg. Type 1 Diabetes, PKU): No

Muscular Dystrophy: No

Neural Tube Defect: No

Recurrent Pregnancy Loss or a Stillbirth: No

Sickle Cell Disease or Trait: No

Ab Sachs: No

Teratological Substances (specify type &amp; strength/dose) since LMP: No

Thalassemia: No

Other Inherited Genetic or Chromosomal Disorder (specify): No

No Significant History of Genetic Disorders: No Significant History of Genetic 
Disorders



Labs

Labs are pending.



Radiology

No new radiology.



Allergies

Temi is allergic to reglan [metoclopramide hcl].



Medications

Temi has a current medication list which includes the following 
prescription(s): prenatal vit 33-iron-folic-dha, ondansetron, and promethazine.



Review of Systems

Constitutional: Negative for activity change, appetite change, fatigue, 
unexpected weight change, weight gain and weight loss.

HENT: Negative for sore throat.

Eyes: Negative for visual disturbance.

Respiratory: Negative for cough and shortness of breath.

Breasts: Negative for discharge, mass, pain and unequal size.

Cardiovascular: Negative for chest pain, palpitations and leg swelling.

Gastrointestinal: Positive for nausea and vomiting. Negative for abdominal pain,
anal bleeding, blood in stool, constipation, diarrhea and rectal pain.

Genitourinary: Negative for bladder incontinence, dysuria, urgency, flank pain, 
vaginal bleeding, vaginal discharge, genital sores, vaginal pain and pelvic 
pain.

Skin: Negative for color change and rash.

Neurological: Negative.  Negative for dizziness, syncope and headaches.

Psychiatric/Behavioral: Negative for confusion, self-injury and sleep 
disturbance. The patient is not nervous/anxious.

Hematological: Negative for cold intolerance and heat intolerance.

Endocrine: Negative for hair loss, cold intolerance, heat intolerance, weight 
gain and weight loss.



/77 (BP Location: Right arm, Patient Position: Sitting, BP CUFF SIZE: 
Adult Medium)  | Pulse 76  | Temp 36.3 C (97.4 F) (Oral)  | Resp 16  | Ht 5'
5" (1.651 m)  | Wt 190 lb 8 oz (86.4 kg)  | LMP 2020 (Approximate)  | BMI 
31.70 kg/m

Pregravid BMI: 33.3



Physical Exam

Vitals reviewed.

Constitutional: She is oriented to person, place, and time. She appears well-
developed, well-nourished and well-groomed. She has no deformities.

Neck: No tenderness and no mass. No thyroid nodules and no thyromegaly palpated.

Cardiovascular: Regular rate and rhythm. No murmur auscultated.

Pulmonary/Chest: Breath sounds clear to auscultation. Normal inspiratory effort.

Abdominal: Abdomen is soft. No mass palpated. No tenderness present. There is no
guarding.

Neuro/Psychiatric: She has a normal mood and affect. She is oriented to person, 
place, and time.

Skin: Skin normal. No lesion and no rash present.





Tattoo present on bilateral arms



Breast: Right breast exhibits no mass, no nipple discharge and no tenderness. 
Left breast exhibits no mass, no nipple discharge and no tenderness. Normal left
breast and normal right breast

Rectal: normal rectum

External genitalia: Normal external genitalia appropriate for age. Normal hair 
distribution. No labial lesion.

     Chaperone present for the exam: MANUEL Kincaid Student

Vagina:Normal vagina. No lesion inspected. No abnormal vaginal discharge found.

Cervix: Normal cervix. No lesion. No tenderness and no discharge present.

Uterus: Uterus is normal size and non-tender.

     10cm Normal uterus

Adnexa: Right adnexa without tenderness or mass. Left adnexa without tenderness 
or mass. Normal leftadnexa and normal right adnexa

Anus/perineum: Normal perineum.



PRENATAL PHYSICAL:

General Exam:

HEENT: Normal

Thyroid: Normal

Lymph Node: Normal

Neurological: Normal

Abdomen: Normal

Skin: Normal

Extremities: Normal



Pelvic Exam:

Vulva: Normal

Vagina: Normal

    Chaperone present for the exam: MANUEL Kincaid Student

Cervix: Normal

Uterus: 8cm  Weeks

Adnexa:  Normal

Spines: Average

Sacrum: Concave

Subpubic Arch: Normal





Assessment/Plan

Supervision of high risk pregnancy, antepartum  (primary encounter diagnosis)

Multiparity

Comment: Routine NOB

Plan: POCT PREGNANCY TEST, POCT URINALYSIS W/O

      SPECIFIC GRAVITY, GLUCOSE 1 HOUR POST PRANDIAL,

      PAP Smear-Liquid Based, GC &amp; CHLAMYDIA

      AMPLIFIED ASSAY, TRICHOMONAS AMPLIFIED ASSAY,

      CBC WITH DIFF, HEPATITIS B SURFACE ANTIGEN, HIV

      1/2 AG-AB WITH REFLEX, POCT URINALYSIS W

      SPECIFIC GRAVITY, PRENATAL WORKUP, BLOOD BANK,

      RUBELLA SCREEN (TRENT) IGG, GALV ONLY -

      SYPHILIS IGG/IGM, URINE CULTURE, VZV ANTIBODY

      SCREEN, CONSULT MATERNAL FETAL MEDICINE

      ULTRASOUND Preferred Location: [place],

      prenatal vit 33-iron-folic-dha (SELECT-OB +

      DHA) 29 mg iron-1 mg -250 mg combo pack

      Denies zika virus risk, signs and symptoms such as fever,rash,joint pain, 
conjunctivitis (red eyes), muscle pain, headaches; outside [place] travel to areas 
affected by zika, and FOB exposure to zika.Educated on use of mosquito 
repellent. Covid x12 screening done, screening results are negative.



History of herpes simplex type 2 infection

Comment: hisotry

Plan: will treat at 36wks



Nausea and vomiting during pregnancy prior to 22 weeks gestation

Comment: on medication

Plan: will continue to monitor



History of depression

Comment: history of depression, denies any depression or any other thoughts or 
feelings or SI and HI

Plan: will continue to monitor



Tobacco abuse

Comment: stopped with pregnancy

Plan: smoking cessation



Class 1 obesity with body mass index (BMI) of 31.0 to 31.9 in adult, unspecified
obesity type, unspecified whether serious comorbidity present

BMI 31.0-31.9,adult

Comment: BMI: 31.70

Plan: Patient encouraged to limit weight gain and advised to eat healthy diet, 
fruits, vegetables, increased fiber and water intake and protein low in fat.  
Encouraged exercise for 30 min everyday; begin regimen with caution to prevent 
injury.  Encouraged to decrease BMI to &lt;25.



Return to clinic in 4 weeks.

Discussed treatment options.

Medications as ordered.

Reviewed patient instructions and provided printed copy.



This visit did not involve counseling and coordination that comprised more than 
50% of the visit time.

GIGNER Grove  2020  10:52 AM

Electronically signed by Shravan Sidhu FNP at 2020 10:54 AM CDT
Martin Greer RN - 2020  9:45 AM CDTPatient is 25 year old female 
here for current pregnancy. Patient is .



1) Previous delivery methods   vaginal

2) Patient is mild/moderate experiencing cramping

3) Patient is not experiencing bleeding.

4) LMP   2020 approximate

5) Last Pap was: 2018 Results:   ASCUS

6) Have you had a flu vaccine this season?   NO

7) PPD candidate?   NO

8) Patient complains of  Severe nausea on meds from ED. Mild/Moderate Cramping

9) Patient denies history of physical, emotional, or sexual abuse. Patient 
states she currently feels  safe at home.



Electronically signed by Martin Greer RN at 2020 10:54 AM CDT
documented in this encounter



Plan of Treatment







             Date         Type         Specialty    Care Team    Description

 

             2020   Technician Visit Maternal Fetal              



                                       Medicine                  

 

             2020   Routine Prenatal Visit OB [facility] Shravan Sidhu, 



                                                                [facility]



                                        



                                                    [address] [address]

15



                                        



                                                                [phone] (Fax) 









             Name         Type         Priority     Associated Diagnoses Date/Ti

me

 

             GLUCOSE 1 HOUR POST LAB          Routine      Supervision of high r

isk 2020 11:00 AM



             PRANDIAL                               pregnancy, antepartum CDT

 

             PAP Smear-Liquid Based LAB          Routine      Supervision of hig

h risk 2020 11:09 AM



                                                    pregnancy, antepartum CDT

 

             GC & CHLAMYDIA AMPLIFIED LAB          Routine      Supervision of h

igh risk 2020 11:09 

AM



             ASSAY                                  pregnancy, antepartum CDT

 

             TRICHOMONAS AMPLIFIED LAB          Routine      Supervision of high

 risk 2020 11:09 AM



             ASSAY                                  pregnancy, antepartum CDT

 

             CBC WITH DIFF LAB          Routine      Supervision of high risk  11:00 AM



                                                    pregnancy, antepartum CDT

 

             HEPATITIS B SURFACE LAB          Routine      Supervision of high r

isk 2020 11:00 AM



             ANTIGEN                                pregnancy, antepartum CDT

 

             HIV 1/2 AG-AB WITH REFLEX LAB          Routine      Supervision of 

high risk 2020 11:00 

AM



                                                    pregnancy, antepartum CDT

 

             RUBELLA SCREEN (TRENT) LAB          Routine      Supervision of hig

h risk 2020 11:00 AM



             IGG                                    pregnancy, antepartum CDT

 

             GALV ONLY - SYPHILIS LAB          Routine      Supervision of high 

risk 2020 11:00 AM



             IGG/IGM                                pregnancy, antepartum CDT

 

             URINE CULTURE LAB          Routine      Supervision of high risk  11:09 AM



                                                    pregnancy, antepartum CDT

 

             VZV ANTIBODY SCREEN LAB          Routine      Supervision of high r

isk 2020 11:00 AM



                                                    pregnancy, antepartum CDT

 

             CBC WITH DIFFERENTIAL LAB          Routine      Supervision of high

 risk 2020 11:00 AM



                                                    pregnancy, antepartum CDT









             Name         Type         Priority     Associated Diagnoses Order S

chedule

 

             GLUCOSE 1 HOUR POST LAB          Routine      Supervision of high r

isk Expected: 2020,



             PRANDIAL                               pregnancy, antepartum 

s: 2021

 

             PAP Smear-Liquid Based LAB          Routine      Supervision of hig

h risk Expected: 

2020,



                                                    pregnancy, antepartum 

s: 2021

 

             GC & CHLAMYDIA AMPLIFIED LAB          Routine      Supervision of h

igh risk Expected: 

2020,



             ASSAY                                  pregnancy, antepartum 

s: 2021

 

             TRICHOMONAS AMPLIFIED LAB          Routine      Supervision of high

 risk Expected: 2020,



             ASSAY                                  pregnancy, antepartum 

s: 2021

 

             CBC WITH DIFF LAB          Routine      Supervision of high risk Ex

pected: 2020,



                                                    pregnancy, antepartum 

s: 2021

 

             HEPATITIS B SURFACE LAB          Routine      Supervision of high r

isk Expected: 2020,



             ANTIGEN                                pregnancy, antepartum 

s: 2021

 

             HIV 1/2 AG-AB WITH LAB          Routine      Supervision of high ri

sk Expected: 2020,



             REFLEX                                 pregnancy, antepartum 

s: 2021

 

             POCT URINALYSIS W LAB          Routine      Supervision of high ris

k 20 Occurrences starting



             SPECIFIC GRAVITY                           pregnancy, antepartum  until



                                                                 2021

 

             PRENATAL WORKUP, BLOOD LAB          Routine      Supervision of hig

h risk Expected: 

2020,



             BANK                                   pregnancy, antepartum 

s: 2021

 

             RUBELLA SCREEN (TRENT) LAB          Routine      Supervision of hig

h risk Expected: 

2020,



             IGG                                    pregnancy, antepartum 

s: 2021

 

             GALV ONLY - SYPHILIS LAB          Routine      Supervision of high 

risk Expected: 2020,



             IGG/IGM                                pregnancy, antepartum 

s: 2021

 

             URINE CULTURE LAB          Routine      Supervision of high risk Ex

pected: 2020,



                                                    pregnancy, antepartum 

s: 2021

 

             VZV ANTIBODY SCREEN LAB          Routine      Supervision of high r

isk Expected: 2020,



                                                    pregnancy, antepartum 

s: 2021









                Health Maintenance Due Date        Last Done       Comments

 

                HPV VACCINES (1 - Female 2006                      



                2-dose series)                                  

 

                INFLUENZA VACCINE (#1) 2020,     



                                                10/18/2018,     



                                                2016      

 

                PAP SMEAR       2021      

 

                Depression Screening 2021,     



                                                2020      

 

                DTaP,Tdap,and Td Vaccines (3 2029,     



                - Td)                           2016      

 

                PNEUMOCOCCAL 0-64 YEARS Aged Out                        No longe

r eligible based



                COMBINED SERIES                                 on patient's age

 to



                                                                complete this to

pic



documented as of this encounter



Procedures







             Procedure Name Priority     Date/Time    Associated Diagnosis Comme

nts

 

             POCT URINALYSIS W/O Routine      2020 10:00 Supervision of hi

gh Results for 

this



             SPECIFIC GRAVITY              AM CDT       risk pregnancy, procedur

e are in



                                                    antepartum   the results



                                                                 section.

 

             POCT PREGNANCY TEST Routine      2020 10:00 Supervision of hi

gh Results for 

this



                                       AM CDT       risk pregnancy, procedure ar

e in



                                                    antepartum   the results



                                                                 section.



documented in this encounter



Results

POCT URINALYSIS W/O SPECIFIC GRAVITY (2020 10:00 AM CDT)





             Component    Value        Ref Range    Performed At [facility]

nature

 

             POCT PH U    5            5 - 8 mg/dl               

 

             POCT U LEUK EST Trace        Negative - Negative              

 

             POCT U NIT   Pos          Negative - Negative              

 

             POCT U PROT  Trace        Negative - Negative              

 

             POCT U GLU   Neg          Negative - Negative              

 

             POCT U KETONE 2+           Negative - Negative              

 

             POCT U BLD   Neg          Negative - Negative              









                                        Specimen

 

                                        Urine - URINE, CLEAN CATCH



POCT PREGNANCY TEST (2020 10:00 AM CDT)





             Component    Value        Ref Range    Performed At [facility]

nature

 

             POCT PREG    Positive                               

 

             On board controls acceptable Yes                                   

 



             with C Line                                         

 

             POCT PREG LOT #                                        

 

             POCT PREG TEST  DATE                                        









                                        Specimen

 

                                        Urine - URINE, CLEAN CATCH



documented in this encounter



Visit Diagnoses







                                        Diagnosis

 

                                        Supervision of high risk pregnancy, ante

partum - Primary

 

                                        Multiparity

 

                                        History of herpes simplex type 2 infecti

on







                                        Personal history of other infectious and

 parasitic disease

 

                                        Nausea and vomiting during pregnancy cintia

or to 22 weeks gestation

 

                                        History of depression







                                        Personal history of other mental disorde

r

 

                                        Tobacco abuse







                                        Tobacco use disorder

 

                                        Class 1 obesity with body mass index (BM

I) of 31.0 to 31.9 in adult, unspecified



                                        obesity type, unspecified whether seriou

s comorbidity present

 

                                        BMI 31.0-31.9,adult







                                        Body Mass Index 31.0-31.9, adult



documented in this encounter



Insurance







          Payer     Benefit Plan / Subscriber ID Effective Dates Phone     Addre

ss   Type



                    Group                                             

 

          [facility]      MEDICAID OF xxxxxxxxx 2020-Present [phone] [address] 

  Medicaid



                      [place]                                       [id number] [place] [address] 



                                                            [id number] 









           Guarantor Name Account Type Relation to Date of    Phone      Billing

 Address



                                 Patient    Birth                 

 

           Temi Cartagena Personal/Famil Self       1995 [phone] [address]                                (Home)     [place]



                                                                  [id number]



documented as of this encounter



Advance Directives







                Name            Relationship    Healthcare Agent Communication



                                                Relationship    

 

                Andrew Arroyona Spouse          Primary healthcare agent [phone]

[phone]



                                                                (Mobile)[phone] (Home)

 

                Vicky Cartagena  Mother          [address] [id number]





                                                agent           (Mobile)[phone] (Home)

## 2020-08-21 NOTE — XMS REPORT
Summary of Care

                             Created on:2020



Patient:Temi Cartagena

Sex:Female

:1995

External Reference #:[id number]





Demographics







                          Address                   [address]



                                                    [place] [address]

 

                          Mobile Phone              [phone]

 

                          Home Phone                [phone]

 

                          Email Address             [email or web address]



 

                          Preferred Language        English

 

                          Marital Status            Single

 

                          Confucianist Affiliation     Unknown

 

                          Race                      White

 

                          Ethnic Group               or 









Author







                          Organization              [facility]

 

                          Address                   [address] [place] [address]









Support







                Name            Relationship    Address         Phone

 

                Andrew George Unavailable     [address] [phone]



                                                [place] [address] 

 

                Vicky Cartagena  Unavailable     [address] [phone]



                                                [place] [address] 









Care Team Providers







                    Name                Role                Phone

 

                    Pcp,  Does Not Have A Primary Care Provider [phone]









Reason for Referral

 (Routine)





           Status     Reason     Specialty  Diagnoses / Referred By Referred To



                                            Procedures Contact    Contact

 

                New Request                     Maternal Fetal  Diagnoses



Supervision of high risk pregnancy, antepartum Shravan Sidhu          



                                                Medicine        



Procedures



CONSULT MATERNAL FETAL MEDICINE ULTRASOUND Preferred Location: [facility]



                                        



                                                       [address] [place], [place] 



                                                                 [address]



                                        



                                                       Phone:     



                                                                 [phone]



                                        



                                                       Fax:       



                                                       [phone] 









Reason for Visit







                          Reason                    Comments

 

                          Initial Prenatal Visit    







Encounter Details







             Date         Type         Department   Care Team    Description

 

             2020   Initial Prenatal [facility] Shravan Sidhu

upervision of 

high risk pregnancy, antepartum (Primary Dx);



                                        Visit               [facility]



                                        Multiparity;



                                       [address] [facility]  History o

f herpes simplex type 2 infection;



                                                            Street



                                        [address]



                                        Nausea and vomiting during pregnancy cintia

or to 22 weeks gestation;



                                       [place], [place] History of depr

ession;



                                                            [id number]



                                        Tobacco abuse;



                                                [phone]



                                        Class 1 obesity with body mass index (BM

I) of 31.0 to 31.9 in adult, 

unspecified obesity type, unspecified whether serious comorbidity present;



                                                    [phone] BMI 31.0-31.9,a

dult



                                                    (Fax)        







Allergies







             Active Allergy Reactions    Severity     Noted Date   Comments

 

             Metoclopramide Hcl Anxiety                   10/14/2018   



documented as of this encounter (statuses as of 2020)



Medications







          Medication Sig       Dispensed Refills   Start     End       Status



                                                  Date      Date      

 

          proMETHazine 25 mg Take 1 tablet 30 tablet 0         20         

   Active



          tabletIndications: by mouth every                     18              

    



          Nausea and vomiting 6 (six) hours                                     

    



          during pregnancy as needed for                                        

 



          prior to 22 weeks Nausea and                                         



          gestation Vomiting (N/V).                                         

 

          ondansetron 4 mg Take 1 tablet 20 tablet 0         20           

 Active



          disintegrating by mouth every                     19                  



          tabletIndications: 8 (eight) hours                                    

     



          Colitis   as needed for                                         



                    Nausea and                                         



                    Vomiting (N/V).                                         

 

          prenatal vit Take 1 Packet 30 Each   6         20            Act

baltazar



          33-iron-folic-dha by mouth daily.                     20              

    



          (SELECT-OB + DHA) 29                                                  

 



          mg iron-1 mg -250 mg                                                  

 



          combo                                                       



          packIndications:                                                   



          Supervision of high                                                   



          risk pregnancy,                                                   



          antepartum                                                   

 

          proMETHazine 25 mg Insert 1  4 Suppository 1         20

   Discontinued



          suppository Suppository                     18        020       



                    into rectum                                         



                    every 4 (four)                                         



                    hours as needed                                         



                    for Nausea and                                         



                    Vomiting (N/V).                                         

 

          famotidine 20 mg Take 1 tablet 60 tablet 3         10/03/20  07/01/2  

 Discontinued



          tablet    by mouth 2                     18        020       



                    (two) times                                         



                    daily.                                            

 

          proCHLORperazine Insert 1  12 Suppository 0         10/14/20  07/01/2 

  Discontinued



          (COMPAZINE) 25 mg Suppository                     18        020       



          suppository into rectum                                         



                    every 12                                          



                    (twelve) hours                                         



                    as needed for                                         



                    Nausea and                                         



                    Vomiting (N/V).                                         

 

          proCHLORperazine 10 Take 1 tablet 30 tablet 3         10/15/20  07/01/

2   Discontinued



          mg tablet by mouth every                     18        020       



                    6 (six) hours                                         



                    as needed for                                         



                    Nausea and                                         



                    Vomiting (N/V).                                         

 

          doxylamine-pyridoxin Take 4 tablets 120 tablet 1         10/15/20  07/

01/2   Discontinued



          e, vit B6, by mouth at                     18        020       



          (DICLEGIS) 10-10 mg bedtime. Take 1                                   

      



          per       tab in the                                         



          tabletIndications: morning, 1 tab                                     

    



          Nausea and vomiting with lunch and                                    

     



          during pregnancy 2 tabs at                                         



          prior to 22 weeks bedtime.                                          



          gestation                                                   

 

          ciprofloxacin HCl Take 1 tablet 20 tablet 0         20 

  Discontinued



          500 mg    by mouth 2                             020       



          tabletIndications: (two) times                                        

 



          Colitis   daily.                                            

 

          metroNIDAZOLE 500 mg Take 1 tablet 14 tablet 0         20   Discontinued



          tabletIndications: by mouth 2                     19        020       



          Colitis   (two) times                                         



                    daily.                                            



documented as of this encounter (statuses as of 2020)



Active Problems







                          Problem                   Noted Date

 

                          History of herpes simplex type 2 infection 2020

 

                          Tobacco abuse             2020

 

                          BMI 31.0-31.9,adult       2020

 

                          Glucose tolerance test abnormal 2019

 

                          12 weeks gestation of pregnancy 10/14/2018

 

                          Rubella non-immune status, antepartum 10/10/2018

 

                          Dyspepsia                 10/03/2018

 

                          Class 1 obesity with body mass index (BMI) of 31.0 to 

31.9 in adult, 2018



                          unspecified obesity type, unspecified whether serious 

comorbidity present 

 

                          UTI (urinary tract infection) during pregnancy 

018









                                        Overview: 







                                        TONIE at next visit -neg









                          Supervision of high risk pregnancy in third trimester 

2018

 

                          Multiparity               2018

 

                          Marijuana use             2018









                                        Overview: 







                                        Reports due to nausea









                          Ovarian cyst              2018









                                        Overview: 







                                        Per outside [facility] see external provided re

cords









                          Atypical squamous cells of undetermined significance (

ASCUS) on 2018



                          Papanicolaou smear of cervix 









                                        Overview: 







                                        Repeat pap in 12 months









                          History of depression     2018

 

                          History of anxiety        2016

 

                          Nausea and vomiting during pregnancy prior to 22 weeks

 gestation 2016

 

                          Supervision of high risk pregnancy, antepartum 

016









                    Pregnant            Estimated Date of Delivery Comments

 

                    Yes                 2021          Based on last menstr

ual period of 2020



                                                            (Approximate)



documented as of this encounter (statuses as of 2020)



Resolved Problems







                    Problem             Noted Date          Resolved Date

 

                    Hyperemesis         10/06/2018          10/09/2018

 

                    Dehydration         2018          10/07/2018

 

                    11 weeks gestation of pregnancy 2018          10/09/20

18

 

                    Well woman exam     2018

 

                    Contraceptive management 2018

 

                    Nexplanon removal   2018









                                        Overview: 







                                        Per patient reports was only for 2 years









                    History of depression 2016

 

                    UTI in pregnancy, antepartum 2016









                                        Overview: 







                                        Still current uti as of 5-6-16









                    BV (bacterial vaginosis) 2016



documented as of this encounter (statuses as of 2020)



Immunizations







                    Name                Administration Dates Next Due

 

                    Influenza Virus Vaccine Quad .5 mL IM 6+ MO 2019, 10/1

2018 

 

                    Influenza Virus Vaccine Quad IM 3+ YRS 2016          

 

                    TDAP                2019          

 

                    TDAP (ADACEL) VACCINE 2016          



documented as of this encounter



Social History







             Tobacco Use  Types        Packs/Day    Years Used   Date

 

             Former Smoker                                        









                Smokeless Tobacco: Never Used                                 









                                        Comments: quit 2020 was smoking 1 

pack per day









                Alcohol Use     Drinks/Week     oz/Week         Comments

 

                No              0 Standard drinks or equivalent 0.0             









                    Pregnant            Estimated Date of Delivery Comments

 

                    Yes                 2021          Based on last menstr

ual period of 2020



                                                            (Approximate)









                          Sex Assigned at Birth     Date Recorded

 

                          Not on file               









                    Job Start Date      Occupation          Industry

 

                    Not on file         Not on file         Not on file









                    Travel History      Travel Start        Travel End









                                        No recent travel history available.









                    COVID-19 Exposure   Response            Date Recorded

 

                    In the last month, have you been in contact with No / Unsure

         2020  9:52 AM 

CDT



                    someone who was confirmed or suspected to have              

       



                    Coronavirus / COVID-19?                     



documented as of this encounter



Last Filed Vital Signs







                Vital Sign      Reading         Time Taken      Comments

 

                Blood Pressure  113/77          2020  9:53 AM CDT 

 

                Pulse           76              2020  9:53 AM CDT 

 

                Temperature     36.3 C (97.4 F) 2020  9:53 AM CDT 

 

                Respiratory Rate 16              2020  9:53 AM CDT 

 

                Oxygen Saturation -               -               

 

                Inhaled Oxygen Concentration -               -               

 

                Weight          86.4 kg (190 lb 8 oz) 2020  9:53 AM CDT 

 

                Height          165.1 cm (5' 5") 2020  9:53 AM CDT 

 

                Body Mass Index 31.7            2020  9:53 AM CDT 



documented in this encounter



Patient Instructions

Patient InstructionsMartin Greer RN - 2020  9:45 AM CDTFormatting of
this note might be different from the original.



Patient Education



Prevention Guidelines,Women Ages 18 to 39

Screening tests and vaccines are an important part of managing your health. A 
screening test is doneto find possible disorders or diseases in people who don't
have any symptoms. The goal is to find a disease early so lifestyle changes can 
be made and you can be watched more closely to reduce the riskof disease, or to 
detect it early enough to treat it most effectively. Screening tests are not 
considered diagnostic, but are used to determine if more testing is needed. 
Health counseling is essential, too. Below are guidelines for these, for women 
ages 18 to 39. Talk with your healthcare provider tomake sure youre 
up-to-date on what you need.

Screening Who needs it How often

Alcohol misuse All women in this age group At routine exams

Blood pressure All women in this age group Yearly checkup if your blood pressure
is normal

Normal blood pressure is less than 120/80 mm Hg

If your blood pressure reading is higher than normal, follow the advice of your 
healthcare provider

Breast cancer All women in this age group should talk with their healthcare 
providers about the needfor clinical breast exams (CBE)1 Clinical breast exam 
every 3 years1

Cervical cancer Women ages 21 and older Women between ages 21 and 29 should have
a Pap test every 3 years; women between ages 30 and 65 are advised to have a Pap
test plus an HPV test every 5 years

Chlamydia Sexually active women ages 25 and younger, and women at increased risk
for infection (suchas having multiple sex partners) Every year if you're at risk
or have symptoms

Depression All women in this age group At routine exams

Type 2 diabetes, prediabetes All women with no symptoms who are overweight or 
obese and have 1 or more other risk factors for diabetes At least every 3 years.
Also, testing for diabetes during pregnancy after the 24th week.

Type 2 diabetes, prediabetes All women diagnosed with gestational diabetes 
Lifelong testing every 3 years

Type 2 diabetes All women with prediabetes Every year

Gonorrhea Sexually active women at increased risk for infection At routine exams

Hepatitis C Anyone at increased risk At routine exams

HIV All women should be tested at least once for HIV between the ages of 13 and 
64 At routine exams.Those with risk factors for HIV should be tested at least 
annually.

Obesity All women in this age group At routine exams

Syphilis Women at increased risk for infection should talk with their healthcare
provider At routineexams

Tuberculosis Women at increased risk for infection should talk with their 
healthcare provider Ask your healthcare provider

Vision All women in this age group At least 1 complete exam in your 20s, and 2 
in your 30s

Vaccine2 Who needs it How often

Chickenpox (varicella) All women in this age group who have no record of this 
infection or vaccine 2doses; the second dose should be given 4 to 8 weeks after 
the first dose

Hepatitis A Women at increased risk for infection should talk with their 
healthcare provider 2 dosesgiven at least 6 months apart

Hepatitis B Women at increased risk for infection should talk with their 
healthcare provider 3 dosesover 6 months; second dose should be given 1 month 
after the first dose; the third dose should be given at least 2 months after the
second dose and at least 4 months after the first dose

Haemophilus influenzaeType B (HIB) Women at increased risk for infection 
should talk with their healthcare provider 1 to 3 doses

Human papillomavirus (HPV) All women in this age group up to age 26 3 doses; the
second dose should be given 1 to 2 months after the first dose and the third 
dose given 6 months after the first dose

Influenza (flu) All women in this age group Once a year

Measles, mumps, rubella (MMR) All women in this age group who have no record of 
these infections or vaccines 1 or 2 doses

Meningococcal Women at increased risk for infection should talk with their 
healthcare provider 1 or more doses

Pneumococcal conjugate vaccine (PCV13)and pneumococcal 
polysaccharidevaccine(PPSV23) Women at increased risk for infection should 
talk with their healthcare provider PCV13: 1 dose ages 19 to 65 (protects 
against 13 types of pneumococcal bacteria)

PPSV23: 1 to2 doses through age 64, or 1 dose at 65 or older (protects against
23 types of pneumococcal bacteria)



Tetanus/diphtheria/pertussis (Td/Tdap) booster All women in this age group Td 
every 10 years, or a one-time dose of Tdap instead of a Td booster after age 18,
then Td every 10 years

Counseling Who needs it How often

BRCA gene mutation testing for breast and ovarian cancer susceptibility Women 
with increased risk for having gene mutation When your risk is known

Breast cancer and chemoprevention Women at high risk for breast cancer When your
risk is known

Diet and exercise Women who are overweight or obese When diagnosed, and then at 
routine exams

Domestic violence Women at the age in which they are able to have children At 
routine exams

Sexually transmitted infection prevention Women who are sexually active At 
routine exams

Skin cancer Prevention of skin cancer in fair-skinned adults At routine exams

Use of tobacco and the health effects it can cause All women in this age group 
Every visit

1 According to the ACS, women ages 20 to 39 years should have a clinical breast 
exam (CBE) as part of their routine health exam every 3 years. Breast self-exams
are an option for women starting in their 20s.But the USPSTF does not 
recommend CBE.

[employer] last reviewed this educational content on 10/1/2017

 [id number] [employer]. [address] [place] [address] 
[address]. All rights reserved. This information is not intended as a substitute for
professional medical care. Always follow your healthcare professional's 
instructions.







Patient Education



Understanding STIs



When it comes to sex, nothing is risk-free. Any sexual contact with the penis, 
vagina, anus, or mouth can spread a sexually transmitted infection (STI). These 
include chlamydia, gonorrhea, herpes, HIV,and genital warts. STIs are also known
as sexually transmitted diseases (STDs). The only sure way toprevent STIs is not
having sex (abstinence). But there are ways to make sex safer. Use a latex 
condom each time you have sex. And choose your partner wisely.

Use condoms for safer sex

If you have sex, latex condoms provide the best protection against STIs. Latex 
condoms stop the exchange of body fluids that carry certain STIs. They also 
limit contact with affected skin. Be aware that a condom doesnt cover all 
skin. So affected skin that isn't covered can still transfer disease. But 
youre safer with a condom than without one. Use a condom even if you use 
other birth control. Birth control methods such as the pill or IUD help prevent 
pregnancy, but they don't protect against STIs.

Choose the right condom

Condoms made of latex prevent disease best. If youre allergic to latex, use 
polyurethane condoms instead. Male condoms fit over the penis. Female condoms 
line the vagina. Before buying a condom, read the label to be sure it prevents 
disease. Some novelty condoms dont.

The right lubricant helps

Buy lubricated condoms or use lubricant. This provides greater comfort and 
reduces the risk for condom breakage. Use only water-based lubricants. Dont 
use oil, lotion, or petroleum jelly. They can weaken the condom, causing 
breakage. Also, you may want to choose lubricants without nonoxynol-9. This
spermicide may cause irritation. It can raise the risk for certain STIs.

Use condoms correctly

For condoms to work, they must be used the right way. Keep these tips in mind:

 Use a new latex condom each time you have sex. Slip the condom on the penis 
before any contact ismade.

 When ready to withdraw, hold the rim of the condom as the penis pulls out. 
This prevents the condom from slipping off.

 Check the expiration date before using a condom.

 Dont store condoms in places that can get hot, such as a car or a wallet 
that is carried in a back pocket.

Get to know your partner

Safer sex is a process. It involves getting to know your partner and making 
informed choices. Ask each other how many partners you have had in the past, and
how many you have now. Find out if either ofyou has HIV or any other STI. If you
decide to have sex, use a condom each time. Dont stop using condoms unless 
youre sure neither of you has other partners and youve both been tested to
confirm you dont have HIV or other STIs. Then stay free of disease by having 
sex only with each other (monogamy).

Keep your cool

Dont let alcohol or drugs cloud your judgment. They could lead you to have 
sex with someone you wouldnt have chosen if you were sober. Or you might 
forget to use a condom. If you do plan to have sex, keep a latex condom with 
you. Dont wait until youre in the heat of passion to try to find one.

Consider abstinence

The only way to be sure you wont get an STI is to abstain from sex. 
Abstinence is a choice that many people make at some point in their life. Maybe 
you want to wait until you are sure youre readybefore you have sex. Maybe 
youd like a break from the responsibilities of sex for a while. Or maybe you 
just want to know your partner better before taking the next step. Abstinence is
a choice you can make now to protect your future.

[employer] last reviewed this educational content on 2018-2020 [employer]. [address] [place] [address], [place] 
[address]. All rights reserved. This information is not intended as a substitute for
professional medical care. Always follow your healthcare professional's 
instructions.







Patient Education



Understanding HIV and AIDS

It's important to know how HIV can get into your body and what happens once 
its there. Then youll be better prepared to protect yourself or others 
against this virus. A person with HIV can look and feel perfectly healthy. But 
that person can give HIV to others as soon as he or she is infected with the 
virus.

Having unsafe or unprotected sex or sharing needles puts you at risk for HIV. 
Talk with your healthcare provider about ways to protect yourself or a loved one
from getting HIV.

How HIV infection progresses

After HIV enters the body, it attacks the immune system in the stages below. A 
person with HIV can infect others once the virus gets into the blood.

HIV with no symptoms. A person with HIV may have no symptoms for years. The only
sign of infection may be a positive blood test for HIV 2 weeks to 3 months or 
later after HIV enters the body.

HIV with symptoms. Some people develop an illness similar to mono 
(mononucleosis) 2 to 4 weeks after the virus enters the body. This is called 
acute retroviral syndrome. Symptoms may include swollen lymph glands, chills, 
fever, night sweats, weakness, weight loss, skin rashes, mouth ulcers, or sore t
hroat. Symptoms may be mild or the person can feel quite sick. Even without 
treatment the symptoms almost always go away in a few days or up to 2 to 3 
weeks. Then the person has no symptoms, often for years. But over time the 
immune system starts to get weaker and symptoms start appearing. People at this 
stage may have a yeast infection in the mouth (oral thrush), shingles, skin 
problems, pneumonia, diarrhea that keeps coming back, or weight loss.

AIDS. AIDS is the most advanced stage of HIV infection, when the immune system 
is severely weakened.Certain rare diseases and cancers that normally would not 
occur, now can occur because the body can no longer fight them well enough. It 
is often these diseases that cause death in people with AIDS. HIV may also 
directly attack the brain and nervous system. This causes seizures and loss of 
memory and body movement. It also affects many other parts of the body. This 
leads to problems such as anemia, low white blood cell count, diarrhea, belly 
pain, skin problems, and many others.



How HIV enters the body

HIV is carried in semen, vaginal fluid, blood, and breastmilk.

 During sex, HIV can enter the body. It gets in through the fragile tissue and
linings, sores, or cuts in or around the vagina, penis, anus, and mouth.

 During drug use, tattooing, or body piercing, the virus can enter the blood 
through an infected needle.

 A mother who has HIV can infect her child during pregnancy, childbirth, and 
breastfeeding.

StayWell last reviewed this educational content on 2019-2020 [employer]. [address] [place] [address], [place] 
[address]. All rights reserved. This information is not intended as a substitute for
professional medical care. Always follow your healthcare professional's 
instructions.







Patient Education



Clinical Breast Exam



Many health organizations recommend a yearly clinical breast exam. This exam may
be done by a gynecologist, family healthcare provider, nurse practitioner, nurse
midwife, or specially trained nurse. Yearly breast exams help tomake 
surethat breast conditions are found early.

Your healthcare providers role

A healthcare professional knows the tests and follow-up care needed if a problem
is found. Your clinical exam is also a great time to ask questions about breast 
self-exams. You can find out if yourechecking your breasts in the best way. 
Or you may want to ask how pregnancy, breast implants, or breast reduction 
surgery affect the way you should check your breasts.

Diagnostic tests

If a clinical exam reveals a breast change, you may have other tests to find out
more. These tests may include:

 Mammography. A low-dose X-ray of your breast tissue.

 Ultrasound. An imaging test that uses sound waves to create images of your 
breast.

 Biopsy. A small amount of breast tissue is removed by needle or by a cut 
(incision). The tissue is then checked under a microscope.

Guidelines for having clinical breast exams

The American College of Obstetricians and Gynecologists recommends that starting
at age 29, you should have a clinical breast exam every 1 to 3 years. After age 
40, have a clinical breast exam each year. If youre at higher risk for breast
cancer, you may need exams more often. Risk factors for breast cancer may 
include:

 Being over 50 or postmenopausal

 Having a family history of breast cancer

 Having the BRCA1 or BRCA2 gene mutation or certain other gene mutations

 Having more menstrual periods due to starting menstruation early(before age
12) or having a late menopause (after age 55)

 Having no pregnancies

 Having a first pregnancy after age 30

 Being obese

 Having a history of radiation treatment to your chest area

 Exposure to REYNOLD during your mother's pregnancy

 Not being active

 Drinking too much alcohol

 Having dense breast tissue

 Taking hormone therapy after menopause

Other health organizations have different recommendations. Talk with your 
healthcare provider about what is best for you.

StayWell last reviewed this educational content on 2017 [employer]. [address] [place] [address], [place] 
[address]. All rights reserved. This information is not intended as a substitute for
professional medical care. Always follow your healthcare professional's 
instructions.







Patient Education



Breast Health: Breast Self-Awareness

What is breast self-awareness?

Breast self-awareness is knowing how your breasts normally look and feel. Your 
breasts change as yougo through different stages of your life. So its 
important to learn what is normal for your breasts. Knowing about your breasts 
helps you spot any changes in them right away. Tell your healthcare provider 
about any changes.

Why is breast self-awareness important?

Many experts now say that women should focus on breast self-awareness instead of
doing a breast self-examination (BSE). These experts include the American Cancer
Society and the American Congress of Obstetricians and Gynecologists. Some 
experts even advise not teaching women to do a BSE. Thats because research 
hasnt shown a clear benefit to doing BSEs.

Breast self-awareness is different than a BSE. It isnt about following a 
certain method and schedule. Its about knowing what's normal for your 
breasts. That way you can spot even small changes right away. If you see any 
changes, tell your healthcare provider.

Changes to look for

Call your healthcare provider if you find any changes in your breasts that worry
you. These changes may be:

 A lump

 Nipple discharge other than breastmilk, especially if it's bloody

 Swelling

 A change in size or shape

 Skin changes, such as redness, thickening, or dimpling of the skin

 Swollen lymph nodes in the armpit

 Nipple problems, such as pain or redness

If you find a lump

Call your provider if you find lumpiness in one breast. Also call if you feel 
something different inthe tissue or feel a definite lump. Sometimes lumpiness 
may be due to menstrual changes. But there may be reason for concern.

Your provider may want to see you right away if you have:

 Nipple discharge that is bloody

 Skin changes on your breast, such as dimpling or puckering

Its okay to be upset if you find a lump. Be sure to call your provider right 
away. Remember that most breast lumps are benign. This means they are not 
cancer.

[employer]ista last reviewed this educational content on 2017-2020 [employer]. [address] [place] [address] 
[address]. All rights reserved. This information is not intended as a substitute for
professional medical care. Always follow your healthcare professional's 
instructions.







Patient Education



Understanding USDA MyPlate

The USDA (U.S. Department of Agriculture) has guidelines to help you make 
healthy food choices. These are called MyPlate. MyPlate shows the food groups 
that make up healthy meals using the image of a place setting. Before you eat, 
think about the healthiest choices for what to put onto your plate or into your 
cup or bowl. To learn more about building a healthy plate, visit 
www.choosemyplate.gov.



The food groups

 Fruits. Any fruit or 100% fruit juice counts as part of the Fruit Group. 
Fruits may be fresh, canned, frozen, or dried, and may be whole, cut-up, or 
pureed. Make half your plate fruits and vegetables.

 Vegetables. Any vegetable or 100% vegetable juice counts as a member of the 
Vegetable Group. Vegetables may be fresh, frozen, canned, or dried. They can be 
served raw or cooked and may be whole, cut-up, or mashed. Make half your plate 
fruits and vegetables.

 Grains. All foods made from grains are part of the Grains Group. These 
include wheat, rice, oats,cornmeal, and barley such as bread, pasta, oatmeal, 
cereal, tortillas, and grits. Grains should be no more than a quarter of your 
plate. At least half of your grains should be whole grains.

 Protein. This group includes meat, poultry, seafood, beans and peas, eggs, 
processed soy products(like tofu), nuts (including nut butters), and seeds. Make
protein choices no more than a quarter ofyour plate. Meat and poultry choices 
should be lean or low fat.

 Dairy. All fluid milk products and foods made from milk that contain calcium,
like yogurt and cheese, are part of the Dairy Group. (Foods that have little 
calcium, such as cream, butter, and cream cheese, are not part of the group.) 
Most dairy choices should be low-fat or fat-free.

 Oils. These are fats that are liquid at room temperature. They include 
canola, corn, olive, soybean, and sunflower oil. Foods that are mainly oil 
include mayonnaise, certain salad dressings, and soft margarines. You should 
have only 5 to 7 teaspoons of oils a day. You probably already get this muchfrom
the food you eat.

InfoVista last reviewed this educational content on 2017-2020 [employer]. [address] [place] [address] 
[address]. All rights reserved. This information is not intended as a substitute for
professional medical care. Always follow your healthcare professional's 
instructions.







Patient Education



The Range of Pap Test Results

When your Pap test is sent to the lab, the lab studies your cell samples and 
reports any abnormal cell changes. Your healthcare provider can discuss these 
changes with you. In some cases, an abnormal Pap test is due to an infection. 
More serious cell changes range from dysplasia to cancer. Talk to your 
healthcare provider about your Pap test.



Normal results

Cervical cells, even normal ones, are always changing. As they mature, normal 
squamous cells move from deeper layers within the cervix. Over time, these cells
flatten and cover the surface of the cervix. Within the cervical canal, the 
cells are different. These glandular cells are taller and not as flat as the 
cells on the surface of the cervix. When a Pap test sample shows healthy cells 
of both types, the results are negative. Keep having Pap tests as often as 
directed.

Abnormal results

A positive Pap test result means some cells in the sample showed abnormal 
changes. These results aregrouped by the type of cell change and the location, 
or extent, of the changes. Depending on the results, you may need further 
testing.

 Inflammation. Noncancerous changes are present. They may be due to normal 
cell repair. Or, they may be caused by an infection, such as HPV or yeast. 
Further testing may be needed. (Also called reactive cellular changes.)

 Atypical squamous cells. Test results are unclear. Cells on the surface of 
the cervix show changes, but their significance is not yet known. Testing for 
HPV and other sexually transmitted infections(STIs) may be needed. Treatment may
be required. (Reported as ASC-US or ASC-H.)

 Atypical glandular cells. Cells lining the cervical canal show abnormal 
changes. Further testing is likely. You may also have treatment to destroy or 
remove problem cells. (Reported as AGC.)

 Mild dysplasia. Cells show distinct changes. More testing or HPV typing may 
be done. You may alsohave treatment to destroy or remove problem cells. 
(Reported as low-grade JARRETT or VERONICA 1.)

 Moderate to severe dysplasia. Cells show precancerous changes. Or, 
noninvasive cancer (carcinoma in situ) may be present. Treatment to destroy or 
remove problem cells is likely. (Reported as high-grade JARRETT or VERONICA 2 or VERONICA 3.)

 Cancer. Different types of cancer may be detected by your Pap test. More 
tests to assess the cancer's extent are likely. The type of treatment will 
depend on the test results and other factors, suchas age and health history. 
(Reported as squamous cell carcinoma, endocervical adenocarcinoma in situ, or 
adenocarcinoma.)

InfoVista last reviewed this educational content on 2017-2020 [employer]. [address] [place] [address], [place] [address]. All rights reserved. This information is not intended as a substitute for
professional medical care. Always follow your healthcare professional's 
instructions.





Electronically signed by Martin Greer RN at 2020  9:55 AM CDT

documented in this encounter



Progress Notes

Shravan Sidhu, RAYP - 2020  9:45 AM CDTFormatting of this note might 
be different from the original.

Chief complaint:

Chief Complaint

Patient presents with

 Initial Prenatal Visit



HPI

.CC: Initial Prenatal Visit



Temi Cartagena is a 25 year old, , /White female. 
Patient's last menstrual period was 2020 (approximate).  She is 10w2d with
an intrauterine pregnancy. Her Estimated Date of Delivery: 21.  She is 
being seen today for her first obstetrical visit. Patient complains of nausea 
and vomiting since finding out of pregnancy, but was placed on medication at ER 
visit  Patient is unsure of LMP but know it was before 2020. Patient 
reports H/O Depression no medication dn denies nay symptoms today. She reports 
+FM and denies contractions, LOF and bleeding today.Patient denies current or 
past physical, sexual or emotional abuse.



OB History

 Para Term  AB Living

3 2 2     2

SAB TAB Ectopic Multiple Live Births

        2



# Outcome Date GA Lbr Sánchez/2nd Weight Sex Delivery Anes PTL Lv

3 Current

2 Term 19 40w1d   F Vag-Spont   REINA

1 Term 10/23/16 41w0d   F VAGINAL   REINA



Histories

OB History

 Para Term  AB Living

3 2 2     2

SAB TAB Ectopic Multiple Live Births

        2



# Outcome Date GA Lbr Sánchez/2nd Weight Sex Delivery Anes PTL Lv

3 Current

2 Term 19 40w1d   F Vag-Spont   REINA

1 Term 10/23/16 41w0d   F VAGINAL   REINA



Past Medical History:

Diagnosis Date

 Anxiety

 no meds

 Atypical squamous cells of undetermined significance (ASCUS) on Papanicolaou
smear of cervix 2018

 BV (bacterial vaginosis) 3/9/2016

 Depression

 denies si/hi, no meds

 Genital herpes 

 Insomnia

 no meds

 Marijuana use 2018

 Ovarian cyst



Family History

Problem Relation Age of Onset

 Diabetes Maternal Grandmother

 Heart Maternal Grandmother

 High cholesterol Maternal Grandmother

 Hypertension Maternal Grandmother

 Depression Mother

 Cancer Mother

     Thyroid

 Anxiety Mother

 Cancer Sister

     cervical

 Arthritis NoFHx

 Asthma NoFHx

 Birth defects NoFHx

 Breast Cancer NoFHx

 Colon Cancer NoFHx

 Ovarian Cancer NoFHx

 Genetic NoFHx

 Mental retardation NoFHx

 Neurological NoFHx

 Osteoporosis NoFHx

 Psychiatry NoFHx

 Other - see comments NoFHx



Family Status

Relation Name Status

 MGMo  (Not Specified)

 Mo  (Not Specified)

 Sis  (Not Specified)

 NoFHx  (Not Specified)



History reviewed. No pertinent surgical history.

Social History



Socioeconomic History

 Marital status: Single

  Spouse name: Not on file

 Number of children: Not on file

 Years of education: Not on file

 Highest education level: Not on file

Occupational History

 Not on file

Social Needs

 Financial resource strain: Not on file

 Food insecurity:

  Worry: Not on file

  Inability: Not on file

 Transportation needs:

  Medical: Not on file

  Non-medical: Not on file

Tobacco Use

 Smoking status: Former Smoker

 Smokeless tobacco: Never Used

 Tobacco comment: quit 2020 was smoking 1 pack per day

Substance and Sexual Activity

 Alcohol use: No

  Alcohol/week: 0.0 standard drinks

 Drug use: Yes

  Types: Marijuana

  Comment: 2018

 Sexual activity: Yes

  Partners: Male

  Comment: Last sexual intercourse 2020

Lifestyle

 Physical activity:

  Days per week: Not on file

  Minutes per session: Not on file

 Stress: Not on file

Relationships

 Social connections:

  Talks on phone: Not on file

  Gets together: Not on file

  Attends [employer] service: Not on file

  Active member of club or organization: Not on file

  Attends meetings of clubs or organizations: Not on file

  Relationship status: Not on file

 Intimate partner violence:

  Fear of current or ex partner: Not on file

  Emotionally abused: Not on file

  Physically abused: Not on file

  Forced sexual activity: Not on file

Other Topics Concern

 Not on file

Social History Narrative

 Pt denies current or past physical, sexual or emotional abuse.

 Pt has no pets.

 Pt lives with her parents.



Social History



Substance and Sexual Activity

Sexual Activity Yes

 Partners: Male

 Comment: Last sexual intercourse 2020



Genetic Screen

Autism / Mental Retardation: No

Canavan Disease: No

Congenital Heart Defect: No

Cystic Fibrosis: No

Down Syndrome: No

Familial Dysautonomia: No

Hemophilia or other Blood Disorders: No

[place] Chorea: No

Maternal Metabolic Disorder--specify (eg. Type 1 Diabetes, PKU): No

Muscular Dystrophy: No

Neural Tube Defect: No

Recurrent Pregnancy Loss or a Stillbirth: No

Sickle Cell Disease or Trait: No

Ab Sachs: No

Teratological Substances (specify type &amp; strength/dose) since LMP: No

Thalassemia: No

Other Inherited Genetic or Chromosomal Disorder (specify): No

No Significant History of Genetic Disorders: No Significant History of Genetic 
Disorders



Labs

Labs are pending.



Radiology

No new radiology.



Allergies

Temi is allergic to reglan [metoclopramide hcl].



Medications

Temi has a current medication list which includes the following 
prescription(s): prenatal vit 33-iron-folic-dha, ondansetron, and promethazine.



Review of Systems

Constitutional: Negative for activity change, appetite change, fatigue, 
unexpected weight change, weight gain and weight loss.

HENT: Negative for sore throat.

Eyes: Negative for visual disturbance.

Respiratory: Negative for cough and shortness of breath.

Breasts: Negative for discharge, mass, pain and unequal size.

Cardiovascular: Negative for chest pain, palpitations and leg swelling.

Gastrointestinal: Positive for nausea and vomiting. Negative for abdominal pain,
anal bleeding, blood in stool, constipation, diarrhea and rectal pain.

Genitourinary: Negative for bladder incontinence, dysuria, urgency, flank pain, 
vaginal bleeding, vaginal discharge, genital sores, vaginal pain and pelvic 
pain.

Skin: Negative for color change and rash.

Neurological: Negative.  Negative for dizziness, syncope and headaches.

Psychiatric/Behavioral: Negative for confusion, self-injury and sleep 
disturbance. The patient is not nervous/anxious.

Hematological: Negative for cold intolerance and heat intolerance.

Endocrine: Negative for hair loss, cold intolerance, heat intolerance, weight 
gain and weight loss.



/77 (BP Location: Right arm, Patient Position: Sitting, BP CUFF SIZE: 
Adult Medium)  | Pulse 76  | Temp 36.3 C (97.4 F) (Oral)  | Resp 16  | Ht 5'
5" (1.651 m)  | Wt 190 lb 8 oz (86.4 kg)  | LMP 2020 (Approximate)  | BMI 
31.70 kg/m

Pregravid BMI: 33.3



Physical Exam

Vitals reviewed.

Constitutional: She is oriented to person, place, and time. She appears well-
developed, well-nourished and well-groomed. She has no deformities.

Neck: No tenderness and no mass. No thyroid nodules and no thyromegaly palpated.

Cardiovascular: Regular rate and rhythm. No murmur auscultated.

Pulmonary/Chest: Breath sounds clear to auscultation. Normal inspiratory effort.

Abdominal: Abdomen is soft. No mass palpated. No tenderness present. There is no
guarding.

Neuro/Psychiatric: She has a normal mood and affect. She is oriented to person, 
place, and time.

Skin: Skin normal. No lesion and no rash present.





Tattoo present on bilateral arms



Breast: Right breast exhibits no mass, no nipple discharge and no tenderness. 
Left breast exhibits no mass, no nipple discharge and no tenderness. Normal left
breast and normal right breast

Rectal: normal rectum

External genitalia: Normal external genitalia appropriate for age. Normal hair 
distribution. No labial lesion.

     Chaperone present for the exam: MANUEL Kincaid Student

Vagina:Normal vagina. No lesion inspected. No abnormal vaginal discharge found.

Cervix: Normal cervix. No lesion. No tenderness and no discharge present.

Uterus: Uterus is normal size and non-tender.

     10cm Normal uterus

Adnexa: Right adnexa without tenderness or mass. Left adnexa without tenderness 
or mass. Normal leftadnexa and normal right adnexa

Anus/perineum: Normal perineum.



PRENATAL PHYSICAL:

General Exam:

HEENT: Normal

Thyroid: Normal

Lymph Node: Normal

Neurological: Normal

Abdomen: Normal

Skin: Normal

Extremities: Normal



Pelvic Exam:

Vulva: Normal

Vagina: Normal

    Chaperone present for the exam: MANUEL Kincaid Student

Cervix: Normal

Uterus: 8cm  Weeks

Adnexa:  Normal

Spines: Average

Sacrum: Concave

Subpubic Arch: Normal





Assessment/Plan

Supervision of high risk pregnancy, antepartum  (primary encounter diagnosis)

Multiparity

Comment: Routine NOB

Plan: POCT PREGNANCY TEST, POCT URINALYSIS W/O

      SPECIFIC GRAVITY, GLUCOSE 1 HOUR POST PRANDIAL,

      PAP Smear-Liquid Based, GC &amp; CHLAMYDIA

      AMPLIFIED ASSAY, TRICHOMONAS AMPLIFIED ASSAY,

      CBC WITH DIFF, HEPATITIS B SURFACE ANTIGEN, HIV

      1/2 AG-AB WITH REFLEX, POCT URINALYSIS W

      SPECIFIC GRAVITY, PRENATAL WORKUP, BLOOD BANK,

      RUBELLA SCREEN (TRENT) IGG, GALV ONLY -

      SYPHILIS IGG/IGM, URINE CULTURE, VZV ANTIBODY

      SCREEN, CONSULT MATERNAL FETAL MEDICINE

      ULTRASOUND Preferred Location: [place],

      prenatal vit 33-iron-folic-dha (SELECT-OB +

      DHA) 29 mg iron-1 mg -250 mg combo pack

      Denies zika virus risk, signs and symptoms such as fever,rash,joint pain, 
conjunctivitis (red eyes), muscle pain, headaches; outside [place] travel to areas 
affected by zika, and FOB exposure to zika.Educated on use of mosquito 
repellent. Covid x12 screening done, screening results are negative.



History of herpes simplex type 2 infection

Comment: hisotry

Plan: will treat at 36wks



Nausea and vomiting during pregnancy prior to 22 weeks gestation

Comment: on medication

Plan: will continue to monitor



History of depression

Comment: history of depression, denies any depression or any other thoughts or 
feelings or SI and HI

Plan: will continue to monitor



Tobacco abuse

Comment: stopped with pregnancy

Plan: smoking cessation



Class 1 obesity with body mass index (BMI) of 31.0 to 31.9 in adult, unspecified
obesity type, unspecified whether serious comorbidity present

BMI 31.0-31.9,adult

Comment: BMI: 31.70

Plan: Patient encouraged to limit weight gain and advised to eat healthy diet, 
fruits, vegetables, increased fiber and water intake and protein low in fat.  
Encouraged exercise for 30 min everyday; begin regimen with caution to prevent 
injury.  Encouraged to decrease BMI to &lt;25.



Return to clinic in 4 weeks.

Discussed treatment options.

Medications as ordered.

Reviewed patient instructions and provided printed copy.



This visit did not involve counseling and coordination that comprised more than 
50% of the visit time.

GINGER [place]  2020  10:52 AM

Electronically signed by Shravan Sidhu FNP at 2020 10:54 AM CDT
Martin Greer RN - 2020  9:45 AM CDTPatient is 25 year old female 
here for current pregnancy. Patient is .



1) Previous delivery methods   vaginal

2) Patient is mild/moderate experiencing cramping

3) Patient is not experiencing bleeding.

4) LMP   2020 approximate

5) Last Pap was: 2018 Results:   ASCUS

6) Have you had a flu vaccine this season?   NO

7) PPD candidate?   NO

8) Patient complains of  Severe nausea on meds from ED. Mild/Moderate Cramping

9) Patient denies history of physical, emotional, or sexual abuse. Patient 
states she currently feels  safe at home.



Electronically signed by Martin Greer RN at 2020 10:54 AM CDT
documented in this encounter



Plan of Treatment







             Date         Type         Specialty    Care Team    Description

 

                2020      Routine Prenatal Visit [facility]   IKE Sidhu, FNP



                                        



                                                                [address] [address]

15



                                        



                                                                [phone] (Fax) 









             Name         Type         Priority     Associated Diagnoses Order S

chedule

 

             GLUCOSE 1 HOUR POST LAB          Routine      Supervision of high E

xpected: 2020,



             PRANDIAL                               risk pregnancy, Expires: 2021



                                                    antepartum   

 

             PAP Smear-Liquid Based LAB          Routine      Supervision of hig

h Expected: 2020,



                                                    risk pregnancy, Expires: 2021



                                                    antepartum   

 

             GC & CHLAMYDIA AMPLIFIED LAB          Routine      Supervision of h

igh Expected: 2020,



             ASSAY                                  risk pregnancy, Expires: 2021



                                                    antepartum   

 

             TRICHOMONAS AMPLIFIED LAB          Routine      Supervision of high

 Expected: 2020,



             ASSAY                                  risk pregnancy, Expires: 2021



                                                    antepartum   

 

             CBC WITH DIFF LAB          Routine      Supervision of high Expecte

d: 2020,



                                                    risk pregnancy, Expires: 2021



                                                    antepartum   

 

             HEPATITIS B SURFACE LAB          Routine      Supervision of high E

xpected: 2020,



             ANTIGEN                                risk pregnancy, Expires: 2021



                                                    antepartum   

 

             HIV 1/2 AG-AB WITH REFLEX LAB          Routine      Supervision of 

high Expected: 2020,



                                                    risk pregnancy, Expires: 2021



                                                    antepartum   

 

             POCT URINALYSIS W LAB          Routine      Supervision of high 20 

Occurrences starting



             SPECIFIC GRAVITY                           risk pregnancy, 20

20 until



                                                    antepartum   2021

 

             PRENATAL WORKUP, BLOOD LAB          Routine      Supervision of [facility]

h Expected: 2020,



             BANK                                   risk pregnancy, Expires: 2021



                                                    antepartum   

 

             RUBELLA SCREEN (TRENT) LAB          Routine      Supervision of [facility]

h Expected: 2020,



             IGG                                    risk pregnancy, Expires: 2021



                                                    antepartum   

 

             GALV ONLY - SYPHILIS LAB          Routine      Supervision of high 

Expected: 2020,



             IGG/IGM                                risk pregnancy, Expires: 2021



                                                    antepartum   

 

             URINE CULTURE LAB          Routine      Supervision of high Expecte

d: 2020,



                                                    risk pregnancy, Expires: 2021



                                                    antepartum   

 

             VZV ANTIBODY SCREEN LAB          Routine      Supervision of high E

xpected: 2020,



                                                    risk pregnancy, Expires: 2021



                                                    antepartum   

 

             CBC WITH DIFFERENTIAL LAB          Routine      Supervision of [facility]

 Ordered: 2020



                                                    risk pregnancy, 



                                                    antepartum   









                Health Maintenance Due Date        Last Done       Comments

 

                HPV VACCINES (1 - Female 2006                      



                2-dose series)                                  

 

                INFLUENZA VACCINE (#1) 2020,     



                                                10/18/2018,     



                                                2016      

 

                PAP SMEAR       2021      

 

                Depression Screening 2021,     



                                                2020      

 

                DTaP,Tdap,and Td Vaccines (3 2029,     



                - Td)                           2016      

 

                PNEUMOCOCCAL 0-64 YEARS Aged Out                        No longe

r eligible based



                COMBINED SERIES                                 on patient's age

 to



                                                                complete this to

pic



documented as of this encounter



Procedures







             Procedure Name Priority     Date/Time    Associated Diagnosis Comme

nts

 

             POCT URINALYSIS W/O Routine      2020 10:00 Supervision of hi

gh Results for 

this



             SPECIFIC GRAVITY              AM CDT       risk pregnancy, procedur

e are in



                                                    antepartum   the results



                                                                 section.

 

             POCT PREGNANCY TEST Routine      2020 10:00 Supervision of hi

gh Results for 

this



                                       AM CDT       risk pregnancy, procedure ar

e in



                                                    antepartum   the results



                                                                 section.



documented in this encounter



Results

POCT URINALYSIS W/O SPECIFIC GRAVITY (2020 10:00 AM CDT)





             Component    Value        Ref Range    Performed At [facility]

nature

 

             POCT PH U    5            5 - 8 mg/dl               

 

             POCT U LEUK EST Trace        Negative - Negative              

 

             POCT U NIT   Pos          Negative - Negative              

 

             POCT U PROT  Trace        Negative - Negative              

 

             POCT U GLU   Neg          Negative - Negative              

 

             POCT U KETONE 2+           Negative - Negative              

 

             POCT U BLD   Neg          Negative - Negative              









                                        Specimen

 

                                        Urine - URINE, CLEAN CATCH



POCT PREGNANCY TEST (2020 10:00 AM CDT)





             Component    Value        Ref Range    Performed At [facility]

nature

 

             POCT PREG    Positive                               

 

             On board controls acceptable Yes                                   

 



             with C Line                                         

 

             POCT PREG LOT #                                        

 

             POCT PREG TEST  DATE                                        









                                        Specimen

 

                                        Urine - URINE, CLEAN CATCH



documented in this encounter



Visit Diagnoses







                                        Diagnosis

 

                                        Supervision of high risk pregnancy, ante

partum - Primary

 

                                        Multiparity

 

                                        History of herpes simplex type 2 infecti

on







                                        Personal history of other infectious and

 parasitic disease

 

                                        Nausea and vomiting during pregnancy cintia

or to 22 weeks gestation

 

                                        History of depression







                                        Personal history of other mental disorde

r

 

                                        Tobacco abuse







                                        Tobacco use disorder

 

                                        Class 1 obesity with body mass index (BM

I) of 31.0 to 31.9 in adult, unspecified



                                        obesity type, unspecified whether seriou

s comorbidity present

 

                                        BMI 31.0-31.9,adult







                                        Body Mass Index 31.0-31.9, adult



documented in this encounter



Insurance







          Payer     Benefit Plan / Subscriber ID Effective Dates Phone     Addre

ss   Type



                    Group                                             

 

          [facility]      MEDICAID OF xxxxxxxxx 2020-Present [id number] [address] 

  Medicaid



                      [place]                                       [id number]



                                        



                                                            [place] 



                                                            [address] 









           Guarantor Name Account Type Relation to Date of    Phone      Billing

 Address



                                 Patient    Birth                 

 

           Temi Cartagena Personal/Famil Self       1995 [phone] [address]  [address]                                (Home)     [place]



                                                                  [id number]



documented as of this encounter



Advance Directives







                Name            Relationship    Healthcare Agent Communication



                                                Relationship    

 

                Andrew Arroyona Spouse          Primary healthcare agent [phone]

[phone]



                                                                (Mobile)[phone] (Home)

 

                Vicky Cartagena  Mother          [facility] [address]





                                                agent           (Mobile)[phone] (Home)

## 2020-08-21 NOTE — XMS REPORT
Summary of Care

                             Created on:2020



Patient:Temi Cartagena

Sex:Female

:1995

External Reference #:[id number]





Demographics







                          Address                   [address]



                                                    [place] [address]

 

                          Mobile Phone              [phone]

 

                          Home Phone                [phone]

 

                          Email Address             [email or web address]

[email or web address]

 

                          Preferred Language        English

 

                          Marital Status            Single

 

                          Oriental orthodox Affiliation     Unknown

 

                          Race                      White

 

                          Ethnic Group               or 









Author







                          Organization              [facility]

 

                          Address                   [address] [place] [address]









Support







                Name            Relationship    Address         Phone

 

                Andrew George Unavailable     [address] [phone]



                                                [place] [address] 

 

                Vicky Cartagena  Unavailable     [address] [phone]



                                                [place] [address] 









Care Team Providers







                    Name                Role                Phone

 

                    Pcp,  Does Not Have A Primary Care Provider [phone]









Reason for Visit







                          Reason                    Comments

 

                          Talk To Nurse             







Encounter Details







             Date         Type         Department   Care Team    Description

 

                2020      Telephone       [facility]- Kalli Sidhu R, FNP



                                        Talk To Nurse



                                                            [place]



                                        [address] [facility]



                                        



                                                            [address] [facility] [address]

[place] [address]



                                        



                                                            [place] [address]

955 [phone] (Fax) 







Allergies







             Active Allergy Reactions    Severity     Noted Date   Comments

 

             Metoclopramide Hcl Anxiety                   10/14/2018   



documented as of this encounter (statuses as of 2020)



Medications







          Medication Sig       Dispensed Refills   Start Date End Date  Status

 

          proMETHazine 25 mg Take 1 tablet 30 tablet 0         2018       

    Active



          tabletIndications: by mouth every                                     

    



          Nausea and vomiting 6 (six) hours                                     

    



          during pregnancy prior as needed for                                  

       



          to 22 weeks gestation Nausea and                                      

   



                    Vomiting (N/V).                                         

 

          ondansetron 4 mg Take 1 tablet 20 tablet 0         2019         

  Active



          disintegrating by mouth every                                         



          tabletIndications: 8 (eight) hours                                    

     



          Colitis   as needed for                                         



                    Nausea and                                         



                    Vomiting (N/V).                                         

 

          prenatal vit Take 1 Packet 30 Each   6         2020           Ac

tive



          33-iron-folic-dha by mouth daily.                                     

    



          (SELECT-OB + DHA) 29                                                  

 



          mg iron-1 mg -250 mg                                                  

 



          combo packIndications:                                                

   



          Supervision of high                                                   



          risk pregnancy,                                                   



          antepartum                                                   

 

          Nitrofurantoin&Nit. Take 1 capsule 20 capsule 0         2020 Active



          Macrocryst (MACROBID) by mouth 2                                      

   



          100 mg    (two) times                                         



          capsuleIndications: daily for 10                                      

   



          Urinary tract days.                                             



          infection without                                                   



          hematuria, site                                                   



          unspecified                                                   

 

          proMETHazine 25 mg Take 1 tablet 30 tablet 1         2020       

    Active



          tabletIndications: by mouth every                                     

    



          Nausea and vomiting 4 (four) hours                                    

     



          during pregnancy prior as needed for                                  

       



          to 22 weeks gestation Nausea and                                      

   



                    Vomiting (N/V).                                         



documented as of this encounter (statuses as of 2020)



Active Problems







                          Problem                   Noted Date

 

                          History of herpes simplex type 2 infection 2020

 

                          Tobacco abuse             2020

 

                          BMI 31.0-31.9,adult       2020

 

                          Glucose tolerance test abnormal 2019

 

                          12 weeks gestation of pregnancy 10/14/2018

 

                          Rubella non-immune status, antepartum 10/10/2018









                                        Overview: 







                                        Address in Postpartum









                          Dyspepsia                 10/03/2018

 

                          Class 1 obesity with body mass index (BMI) of 31.0 to 

31.9 in adult, 2018



                          unspecified obesity type, unspecified whether serious 

comorbidity present 

 

                          UTI (urinary tract infection) during pregnancy 

018









                                        Overview: 







                                        TONIE at next visit -neg









                          Supervision of high risk pregnancy in third trimester 

2018

 

                          Multiparity               2018

 

                          Marijuana use             2018









                                        Overview: 







                                        Reports due to nausea









                          Ovarian cyst              2018









                                        Overview: 







                                        Per outside [facility] see external provided re

cords









                          Atypical squamous cells of undetermined significance (

ASCUS) on 2018



                          Papanicolaou smear of cervix 









                                        Overview: 







                                        Repeat pap in 12 months









                          History of depression     2018

 

                          History of anxiety        2016

 

                          Nausea and vomiting during pregnancy prior to 22 weeks

 gestation 2016

 

                          Supervision of high risk pregnancy, antepartum 

016









                    Pregnant            Estimated Date of Delivery Comments

 

                    Yes                 2021          Based on last menstr

ual period of 2020



                                                            (Approximate)



documented as of this encounter (statuses as of 2020)



Resolved Problems







                    Problem             Noted Date          Resolved Date

 

                    Hyperemesis         10/06/2018          10/09/2018

 

                    Dehydration         2018          10/07/2018

 

                    11 weeks gestation of pregnancy 2018          10/09/20

18

 

                    Well woman exam     2018

 

                    Contraceptive management 2018

 

                    Nexplanon removal   2018









                                        Overview: 







                                        Per patient reports was only for 2 years









                    History of depression 2016

 

                    UTI in pregnancy, antepartum 2016









                                        Overview: 







                                        Still current uti as of 16









                    BV (bacterial vaginosis) 2016



documented as of this encounter (statuses as of 2020)



Immunizations







                    Name                Administration Dates Next Due

 

                    Influenza Virus Vaccine Quad .5 mL IM 6+ MO 2019, 10/1

2018 

 

                    Influenza Virus Vaccine Quad IM 3+ YRS 2016          

 

                    TDAP                2019          

 

                    TDAP (ADACEL) VACCINE 2016          



documented as of this encounter



Social History







             Tobacco Use  Types        Packs/Day    Years Used   Date

 

             Former Smoker                                        









                Smokeless Tobacco: Never Used                                 









                                        Comments: quit 2020 was smoking 1 

pack per day









                Alcohol Use     Drinks/Week     oz/Week         Comments

 

                No              0 Standard drinks or equivalent 0.0             









                    Pregnant            Estimated Date of Delivery Comments

 

                    Yes                 2021          Based on last menstr

ual period of 2020



                                                            (Approximate)









                          Sex Assigned at Birth     Date Recorded

 

                          Not on file               









                    Job Start Date      Occupation          Industry

 

                    Not on file         Not on file         Not on file









                    Travel History      Travel Start        Travel End









                                        No recent travel history available.









                    COVID-19 Exposure   Response            Date Recorded

 

                    In the last month, have you been in contact with No / Unsure

         2020  9:52 AM 

CDT



                    someone who was confirmed or suspected to have              

       



                    Coronavirus / COVID-19?                     



documented as of this encounter



Last Filed Vital Signs

Not on filedocumented in this encounter



Plan of Treatment







             Date         Type         Specialty    Care Team    Description

 

                2020      Routine Prenatal Visit OB [facility]ell[facility]   IKE Sidhu, FNP



                                        



                                                                [address] [address]

15



                                        



                                                                [phone] (Fax) 









                Health Maintenance Due Date        Last Done       Comments

 

                HPV VACCINES (1 - Female 2006                      



                2-dose series)                                  

 

                INFLUENZA VACCINE (#1) 2020,     



                                                10/18/2018,     



                                                2016      

 

                Depression Screening 2021,     



                                                2020      

 

                PAP SMEAR       2023,     



                                                2018      

 

                DTaP,Tdap,and Td Vaccines (3 2029,     



                - Td)                           2016      

 

                PNEUMOCOCCAL 0-64 YEARS Aged Out                        No longe

r eligible based



                COMBINED SERIES                                 on patient's age

 to



                                                                complete this to

pic



documented as of this encounter



Results

Not on filedocumented in this encounter



Visit Diagnoses







                                        Diagnosis

 

                                        Nausea and vomiting during pregnancy cintia

or to 22 weeks gestation - Primary



documented in this encounter



Insurance







          Payer     Benefit Plan / Subscriber ID Effective Dates Phone     Addre

ss   Type



                    Group                                             

 

          [employer] [place] 2020-Present [id number] [address] 

  Medicaid



                      [place]                                       [id number] [place] [address] 



                                                            [id number] 



documented as of this encounter



Advance Directives







                Name            Relationship    Healthcare Agent Communication



                                                Relationship    

 

                Andrew Arroyona Spouse          Primary healthcare agent [phone]



                                                                (Mobile)[phone] (Home)

 

                Vicky Cartagena  Mother          [address] [address]





                                                agent           (Mobile)[phone] (Home)

## 2020-08-21 NOTE — XMS REPORT
Summary of Care

                             Created on:2020



Patient:Temi Cartagena

Sex:Female

:1995

External Reference #:[id number]





Demographics







                          Address                   [address]



                                                    [place] [address]

 

                          Mobile Phone              [phone]

 

                          Home Phone                [phone]

 

                          Email Address             [email or web address]



 

                          Preferred Language        English

 

                          Marital Status            Single

 

                          Episcopalian Affiliation     Unknown

 

                          Race                      White

 

                          Ethnic Group               or 









Author







                          Organization              [facility]

 

                          Address                   [address] [place] [id number]









Support







                Name            Relationship    Address         Phone

 

                Andrew George Unavailable     [address] [phone]



                                                [place] [address] 

 

                Vicky Cartagena  Unavailable     [address] [phone]



                                                [place] [address] 









Care Team Providers







                    Name                Role                Phone

 

                    Pcp,  Does Not Have A Primary Care Provider [phone]









Reason for Referral

 (Routine)





           Status     Reason     Specialty  Diagnoses / Referred By Referred To



                                            Procedures Contact    Contact

 

                New Request                     Maternal Fetal  Diagnoses



Supervision of high risk pregnancy, antepartum Shravan Sidhu          



                                                Medicine        



Procedures



CONSULT MATERNAL FETAL MEDICINE ULTRASOUND Preferred Location: [facility]



                                        



                                                       [address] [place], [place] 



                                                                 [address]



                                        



                                                       Phone:     



                                                                 [phone]



                                        



                                                       Fax:       



                                                       [phone] 









Reason for Visit







                          Reason                    Comments

 

                          Initial Prenatal Visit    







Encounter Details







             Date         Type         Department   Care Team    Description

 

             2020   Initial Prenatal [facility]

upervision of 

high risk pregnancy, antepartum (Primary Dx);



                                        Visit               [facility]



                                        Multiparity;



                                       [address] [facility]  History o

f herpes simplex type 2 infection;



                                                            Street



                                        [place]



                                        Nausea and vomiting during pregnancy cintia

or to 22 weeks gestation;



                                       [place], [place] History of depr

ession;



                                                            [id number]



                                        Tobacco abuse;



                                                [phone]



                                        Class 1 obesity with body mass index (BM

I) of 31.0 to 31.9 in adult, 

unspecified obesity type, unspecified whether serious comorbidity present;



                                                    [phone] BMI 31.0-31.9,a

dult



                                                    (Fax)        







Allergies







             Active Allergy Reactions    Severity     Noted Date   Comments

 

             Metoclopramide Hcl Anxiety                   10/14/2018   



documented as of this encounter (statuses as of 2020)



Medications







          Medication Sig       Dispensed Refills   Start     End       Status



                                                  Date      Date      

 

          proMETHazine 25 mg Take 1 tablet 30 tablet 0         20         

   Active



          tabletIndications: by mouth every                     18              

    



          Nausea and vomiting 6 (six) hours                                     

    



          during pregnancy as needed for                                        

 



          prior to 22 weeks Nausea and                                         



          gestation Vomiting (N/V).                                         

 

          ondansetron 4 mg Take 1 tablet 20 tablet 0         20           

 Active



          disintegrating by mouth every                     19                  



          tabletIndications: 8 (eight) hours                                    

     



          Colitis   as needed for                                         



                    Nausea and                                         



                    Vomiting (N/V).                                         

 

          prenatal vit Take 1 Packet 30 Each   6         20            Act

baltazar



          33-iron-folic-dha by mouth daily.                     20              

    



          (SELECT-OB + DHA) 29                                                  

 



          mg iron-1 mg -250 mg                                                  

 



          combo                                                       



          packIndications:                                                   



          Supervision of high                                                   



          risk pregnancy,                                                   



          antepartum                                                   

 

          proMETHazine 25 mg Insert 1  4 Suppository 1         20

   Discontinued



          suppository Suppository                     18        020       



                    into rectum                                         



                    every 4 (four)                                         



                    hours as needed                                         



                    for Nausea and                                         



                    Vomiting (N/V).                                         

 

          famotidine 20 mg Take 1 tablet 60 tablet 3         10/03/20  07/01/2  

 Discontinued



          tablet    by mouth 2                     18        020       



                    (two) times                                         



                    daily.                                            

 

          proCHLORperazine Insert 1  12 Suppository 0         10/14/20  07/01/2 

  Discontinued



          (COMPAZINE) 25 mg Suppository                     18        020       



          suppository into rectum                                         



                    every 12                                          



                    (twelve) hours                                         



                    as needed for                                         



                    Nausea and                                         



                    Vomiting (N/V).                                         

 

          proCHLORperazine 10 Take 1 tablet 30 tablet 3         10/15/20  07/01/

2   Discontinued



          mg tablet by mouth every                     18        020       



                    6 (six) hours                                         



                    as needed for                                         



                    Nausea and                                         



                    Vomiting (N/V).                                         

 

          doxylamine-pyridoxin Take 4 tablets 120 tablet 1         10/15/20  07/

01/2   Discontinued



          e, vit B6, by mouth at                     18        020       



          (DICLEGIS) 10-10 mg bedtime. Take 1                                   

      



          per       tab in the                                         



          tabletIndications: morning, 1 tab                                     

    



          Nausea and vomiting with lunch and                                    

     



          during pregnancy 2 tabs at                                         



          prior to 22 weeks bedtime.                                          



          gestation                                                   

 

          ciprofloxacin HCl Take 1 tablet 20 tablet 0         20 

  Discontinued



          500 mg    by mouth 2                             020       



          tabletIndications: (two) times                                        

 



          Colitis   daily.                                            

 

          metroNIDAZOLE 500 mg Take 1 tablet 14 tablet 0         20   Discontinued



          tabletIndications: by mouth 2                     19        020       



          Colitis   (two) times                                         



                    daily.                                            



documented as of this encounter (statuses as of 2020)



Active Problems







                          Problem                   Noted Date

 

                          History of herpes simplex type 2 infection 2020

 

                          Tobacco abuse             2020

 

                          BMI 31.0-31.9,adult       2020

 

                          Glucose tolerance test abnormal 2019

 

                          12 weeks gestation of pregnancy 10/14/2018

 

                          Rubella non-immune status, antepartum 10/10/2018

 

                          Dyspepsia                 10/03/2018

 

                          Class 1 obesity with body mass index (BMI) of 31.0 to 

31.9 in adult, 2018



                          unspecified obesity type, unspecified whether serious 

comorbidity present 

 

                          UTI (urinary tract infection) during pregnancy 

018









                                        Overview: 







                                        TONIE at next visit -neg









                          Supervision of high risk pregnancy in third trimester 

2018

 

                          Multiparity               2018

 

                          Marijuana use             2018









                                        Overview: 







                                        Reports due to nausea









                          Ovarian cyst              2018









                                        Overview: 







                                        Per outside [facility] see external provided re

cords









                          Atypical squamous cells of undetermined significance (

ASCUS) on 2018



                          Papanicolaou smear of cervix 









                                        Overview: 







                                        Repeat pap in 12 months









                          History of depression     2018

 

                          History of anxiety        2016

 

                          Nausea and vomiting during pregnancy prior to 22 weeks

 gestation 2016

 

                          Supervision of high risk pregnancy, antepartum 

016









                    Pregnant            Estimated Date of Delivery Comments

 

                    Yes                 2021          Based on last menstr

ual period of 2020



                                                            (Approximate)



documented as of this encounter (statuses as of 2020)



Resolved Problems







                    Problem             Noted Date          Resolved Date

 

                    Hyperemesis         10/06/2018          10/09/2018

 

                    Dehydration         2018          10/07/2018

 

                    11 weeks gestation of pregnancy 2018          10/09/20

18

 

                    Well woman exam     2018

 

                    Contraceptive management 2018

 

                    Nexplanon removal   2018









                                        Overview: 







                                        Per patient reports was only for 2 years









                    History of depression 2016

 

                    UTI in pregnancy, antepartum 2016









                                        Overview: 







                                        Still current uti as of 5-6-16









                    BV (bacterial vaginosis) 2016



documented as of this encounter (statuses as of 2020)



Immunizations







                    Name                Administration Dates Next Due

 

                    Influenza Virus Vaccine Quad .5 mL IM 6+ MO 2019, 10/1

2018 

 

                    Influenza Virus Vaccine Quad IM 3+ YRS 2016          

 

                    TDAP                2019          

 

                    TDAP (ADACEL) VACCINE 2016          



documented as of this encounter



Social History







             Tobacco Use  Types        Packs/Day    Years Used   Date

 

             Former Smoker                                        









                Smokeless Tobacco: Never Used                                 









                                        Comments: quit 2020 was smoking 1 

pack per day









                Alcohol Use     Drinks/Week     oz/Week         Comments

 

                No              0 Standard drinks or equivalent 0.0             









                    Pregnant            Estimated Date of Delivery Comments

 

                    Yes                 2021          Based on last menstr

ual period of 2020



                                                            (Approximate)









                          Sex Assigned at Birth     Date Recorded

 

                          Not on file               









                    Job Start Date      Occupation          Industry

 

                    Not on file         Not on file         Not on file









                    Travel History      Travel Start        Travel End









                                        No recent travel history available.









                    COVID-19 Exposure   Response            Date Recorded

 

                    In the last month, have you been in contact with No / Unsure

         2020  9:52 AM 

CDT



                    someone who was confirmed or suspected to have              

       



                    Coronavirus / COVID-19?                     



documented as of this encounter



Last Filed Vital Signs







                Vital Sign      Reading         Time Taken      Comments

 

                Blood Pressure  113/77          2020  9:53 AM CDT 

 

                Pulse           76              2020  9:53 AM CDT 

 

                Temperature     36.3 C (97.4 F) 2020  9:53 AM CDT 

 

                Respiratory Rate 16              2020  9:53 AM CDT 

 

                Oxygen Saturation -               -               

 

                Inhaled Oxygen Concentration -               -               

 

                Weight          86.4 kg (190 lb 8 oz) 2020  9:53 AM CDT 

 

                Height          165.1 cm (5' 5") 2020  9:53 AM CDT 

 

                Body Mass Index 31.7            2020  9:53 AM CDT 



documented in this encounter



Patient Instructions

Patient InstructionsMartin Greer RN - 2020  9:45 AM CDTFormatting of
this note might be different from the original.



Patient Education



Prevention Guidelines,Women Ages 18 to 39

Screening tests and vaccines are an important part of managing your health. A 
screening test is doneto find possible disorders or diseases in people who don't
have any symptoms. The goal is to find a disease early so lifestyle changes can 
be made and you can be watched more closely to reduce the riskof disease, or to 
detect it early enough to treat it most effectively. Screening tests are not 
considered diagnostic, but are used to determine if more testing is needed. 
Health counseling is essential, too. Below are guidelines for these, for women 
ages 18 to 39. Talk with your healthcare provider tomake sure youre 
up-to-date on what you need.

Screening Who needs it How often

Alcohol misuse All women in this age group At routine exams

Blood pressure All women in this age group Yearly checkup if your blood pressure
is normal

Normal blood pressure is less than 120/80 mm Hg

If your blood pressure reading is higher than normal, follow the advice of your 
healthcare provider

Breast cancer All women in this age group should talk with their healthcare 
providers about the needfor clinical breast exams (CBE)1 Clinical breast exam 
every 3 years1

Cervical cancer Women ages 21 and older Women between ages 21 and 29 should have
a Pap test every 3 years; women between ages 30 and 65 are advised to have a Pap
test plus an HPV test every 5 years

Chlamydia Sexually active women ages 25 and younger, and women at increased risk
for infection (suchas having multiple sex partners) Every year if you're at risk
or have symptoms

Depression All women in this age group At routine exams

Type 2 diabetes, prediabetes All women with no symptoms who are overweight or 
obese and have 1 or more other risk factors for diabetes At least every 3 years.
Also, testing for diabetes during pregnancy after the 24th week.

Type 2 diabetes, prediabetes All women diagnosed with gestational diabetes 
Lifelong testing every 3 years

Type 2 diabetes All women with prediabetes Every year

Gonorrhea Sexually active women at increased risk for infection At routine exams

Hepatitis C Anyone at increased risk At routine exams

HIV All women should be tested at least once for HIV between the ages of 13 and 
64 At routine exams.Those with risk factors for HIV should be tested at least 
annually.

Obesity All women in this age group At routine exams

Syphilis Women at increased risk for infection should talk with their healthcare
provider At routineexams

Tuberculosis Women at increased risk for infection should talk with their 
healthcare provider Ask your healthcare provider

Vision All women in this age group At least 1 complete exam in your 20s, and 2 
in your 30s

Vaccine2 Who needs it How often

Chickenpox (varicella) All women in this age group who have no record of this 
infection or vaccine 2doses; the second dose should be given 4 to 8 weeks after 
the first dose

Hepatitis A Women at increased risk for infection should talk with their 
healthcare provider 2 dosesgiven at least 6 months apart

Hepatitis B Women at increased risk for infection should talk with their 
healthcare provider 3 dosesover 6 months; second dose should be given 1 month 
after the first dose; the third dose should be given at least 2 months after the
second dose and at least 4 months after the first dose

Haemophilus influenzaeType B (HIB) Women at increased risk for infection 
should talk with their healthcare provider 1 to 3 doses

Human papillomavirus (HPV) All women in this age group up to age 26 3 doses; the
second dose should be given 1 to 2 months after the first dose and the third 
dose given 6 months after the first dose

Influenza (flu) All women in this age group Once a year

Measles, mumps, rubella (MMR) All women in this age group who have no record of 
these infections or vaccines 1 or 2 doses

Meningococcal Women at increased risk for infection should talk with their 
healthcare provider 1 or more doses

Pneumococcal conjugate vaccine (PCV13)and pneumococcal 
polysaccharidevaccine(PPSV23) Women at increased risk for infection should 
talk with their healthcare provider PCV13: 1 dose ages 19 to 65 (protects 
against 13 types of pneumococcal bacteria)

PPSV23: 1 to2 doses through age 64, or 1 dose at 65 or older (protects against
23 types of pneumococcal bacteria)



Tetanus/diphtheria/pertussis (Td/Tdap) booster All women in this age group Td 
every 10 years, or a one-time dose of Tdap instead of a Td booster after age 18,
then Td every 10 years

Counseling Who needs it How often

BRCA gene mutation testing for breast and ovarian cancer susceptibility Women 
with increased risk for having gene mutation When your risk is known

Breast cancer and chemoprevention Women at high risk for breast cancer When your
risk is known

Diet and exercise Women who are overweight or obese When diagnosed, and then at 
routine exams

Domestic violence Women at the age in which they are able to have children At 
routine exams

Sexually transmitted infection prevention Women who are sexually active At 
routine exams

Skin cancer Prevention of skin cancer in fair-skinned adults At routine exams

Use of tobacco and the health effects it can cause All women in this age group 
Every visit

1 According to the ACS, women ages 20 to 39 years should have a clinical breast 
exam (CBE) as part of their routine health exam every 3 years. Breast self-exams
are an option for women starting in their 20s.But the USPSTF does not 
recommend CBE.

[employer] last reviewed this educational content on 10/1/2017

 8372-1946 [employer]. [address] [place] [address] 
[address]. All rights reserved. This information is not intended as a substitute for
professional medical care. Always follow your healthcare professional's 
instructions.







Patient Education



Understanding STIs



When it comes to sex, nothing is risk-free. Any sexual contact with the penis, 
vagina, anus, or mouth can spread a sexually transmitted infection (STI). These 
include chlamydia, gonorrhea, herpes, HIV,and genital warts. STIs are also known
as sexually transmitted diseases (STDs). The only sure way toprevent STIs is not
having sex (abstinence). But there are ways to make sex safer. Use a latex 
condom each time you have sex. And choose your partner wisely.

Use condoms for safer sex

If you have sex, latex condoms provide the best protection against STIs. Latex 
condoms stop the exchange of body fluids that carry certain STIs. They also 
limit contact with affected skin. Be aware that a condom doesnt cover all 
skin. So affected skin that isn't covered can still transfer disease. But 
youre safer with a condom than without one. Use a condom even if you use 
other birth control. Birth control methods such as the pill or IUD help prevent 
pregnancy, but they don't protect against STIs.

Choose the right condom

Condoms made of latex prevent disease best. If youre allergic to latex, use 
polyurethane condoms instead. Male condoms fit over the penis. Female condoms 
line the vagina. Before buying a condom, read the label to be sure it prevents 
disease. Some novelty condoms dont.

The right lubricant helps

Buy lubricated condoms or use lubricant. This provides greater comfort and 
reduces the risk for condom breakage. Use only water-based lubricants. Dont 
use oil, lotion, or petroleum jelly. They can weaken the condom, causing 
breakage. Also, you may want to choose lubricants without nonoxynol-9. This
spermicide may cause irritation. It can raise the risk for certain STIs.

Use condoms correctly

For condoms to work, they must be used the right way. Keep these tips in mind:

 Use a new latex condom each time you have sex. Slip the condom on the penis 
before any contact ismade.

 When ready to withdraw, hold the rim of the condom as the penis pulls out. 
This prevents the condom from slipping off.

 Check the expiration date before using a condom.

 Dont store condoms in places that can get hot, such as a car or a wallet 
that is carried in a back pocket.

Get to know your partner

Safer sex is a process. It involves getting to know your partner and making 
informed choices. Ask each other how many partners you have had in the past, and
how many you have now. Find out if either ofyou has HIV or any other STI. If you
decide to have sex, use a condom each time. Dont stop using condoms unless 
youre sure neither of you has other partners and youve both been tested to
confirm you dont have HIV or other STIs. Then stay free of disease by having 
sex only with each other (monogamy).

Keep your cool

Dont let alcohol or drugs cloud your judgment. They could lead you to have 
sex with someone you wouldnt have chosen if you were sober. Or you might 
forget to use a condom. If you do plan to have sex, keep a latex condom with 
you. Dont wait until youre in the heat of passion to try to find one.

Consider abstinence

The only way to be sure you wont get an STI is to abstain from sex. 
Abstinence is a choice that many people make at some point in their life. Maybe 
you want to wait until you are sure youre readybefore you have sex. Maybe 
youd like a break from the responsibilities of sex for a while. Or maybe you 
just want to know your partner better before taking the next step. Abstinence is
a choice you can make now to protect your future.

[employer] last reviewed this educational content on 2018-2020 [employer]. [address] [place] [address], [place] 
[address]. All rights reserved. This information is not intended as a substitute for
professional medical care. Always follow your healthcare professional's 
instructions.







Patient Education



Understanding HIV and AIDS

It's important to know how HIV can get into your body and what happens once 
its there. Then youll be better prepared to protect yourself or others 
against this virus. A person with HIV can look and feel perfectly healthy. But 
that person can give HIV to others as soon as he or she is infected with the 
virus.

Having unsafe or unprotected sex or sharing needles puts you at risk for HIV. 
Talk with your healthcare provider about ways to protect yourself or a loved one
from getting HIV.

How HIV infection progresses

After HIV enters the body, it attacks the immune system in the stages below. A 
person with HIV can infect others once the virus gets into the blood.

HIV with no symptoms. A person with HIV may have no symptoms for years. The only
sign of infection may be a positive blood test for HIV 2 weeks to 3 months or 
later after HIV enters the body.

HIV with symptoms. Some people develop an illness similar to mono 
(mononucleosis) 2 to 4 weeks after the virus enters the body. This is called 
acute retroviral syndrome. Symptoms may include swollen lymph glands, chills, 
fever, night sweats, weakness, weight loss, skin rashes, mouth ulcers, or sore t
hroat. Symptoms may be mild or the person can feel quite sick. Even without 
treatment the symptoms almost always go away in a few days or up to 2 to 3 
weeks. Then the person has no symptoms, often for years. But over time the 
immune system starts to get weaker and symptoms start appearing. People at this 
stage may have a yeast infection in the mouth (oral thrush), shingles, skin 
problems, pneumonia, diarrhea that keeps coming back, or weight loss.

AIDS. AIDS is the most advanced stage of HIV infection, when the immune system 
is severely weakened.Certain rare diseases and cancers that normally would not 
occur, now can occur because the body can no longer fight them well enough. It 
is often these diseases that cause death in people with AIDS. HIV may also 
directly attack the brain and nervous system. This causes seizures and loss of 
memory and body movement. It also affects many other parts of the body. This 
leads to problems such as anemia, low white blood cell count, diarrhea, belly 
pain, skin problems, and many others.



How HIV enters the body

HIV is carried in semen, vaginal fluid, blood, and breastmilk.

 During sex, HIV can enter the body. It gets in through the fragile tissue and
linings, sores, or cuts in or around the vagina, penis, anus, and mouth.

 During drug use, tattooing, or body piercing, the virus can enter the blood 
through an infected needle.

 A mother who has HIV can infect her child during pregnancy, childbirth, and 
breastfeeding.

[employer] last reviewed this educational content on 2019-2020 [employer]. [address] [place] [address], [place] 
[address]. All rights reserved. This information is not intended as a substitute for
professional medical care. Always follow your healthcare professional's 
instructions.







Patient Education



Clinical Breast Exam



Many health organizations recommend a yearly clinical breast exam. This exam may
be done by a gynecologist, family healthcare provider, nurse practitioner, nurse
midwife, or specially trained nurse. Yearly breast exams help tomake 
surethat breast conditions are found early.

Your healthcare providers role

A healthcare professional knows the tests and follow-up care needed if a problem
is found. Your clinical exam is also a great time to ask questions about breast 
self-exams. You can find out if yourechecking your breasts in the best way. 
Or you may want to ask how pregnancy, breast implants, or breast reduction 
surgery affect the way you should check your breasts.

Diagnostic tests

If a clinical exam reveals a breast change, you may have other tests to find out
more. These tests may include:

 Mammography. A low-dose X-ray of your breast tissue.

 Ultrasound. An imaging test that uses sound waves to create images of your 
breast.

 Biopsy. A small amount of breast tissue is removed by needle or by a cut 
(incision). The tissue is then checked under a microscope.

Guidelines for having clinical breast exams

The American College of Obstetricians and Gynecologists recommends that starting
at age 29, you should have a clinical breast exam every 1 to 3 years. After age 
40, have a clinical breast exam each year. If youre at higher risk for breast
cancer, you may need exams more often. Risk factors for breast cancer may 
include:

 Being over 50 or postmenopausal

 Having a family history of breast cancer

 Having the BRCA1 or BRCA2 gene mutation or certain other gene mutations

 Having more menstrual periods due to starting menstruation early(before age
12) or having a late menopause (after age 55)

 Having no pregnancies

 Having a first pregnancy after age 30

 Being obese

 Having a history of radiation treatment to your chest area

 Exposure to REYNOLD during your mother's pregnancy

 Not being active

 Drinking too much alcohol

 Having dense breast tissue

 Taking hormone therapy after menopause

Other health organizations have different recommendations. Talk with your 
healthcare provider about what is best for you.

[employer] last reviewed this educational content on 2017 [employer]. [address] [place] [address], [place] 
[address]. All rights reserved. This information is not intended as a substitute for
professional medical care. Always follow your healthcare professional's 
instructions.







Patient Education



Breast Health: Breast Self-Awareness

What is breast self-awareness?

Breast self-awareness is knowing how your breasts normally look and feel. Your 
breasts change as yougo through different stages of your life. So its 
important to learn what is normal for your breasts. Knowing about your breasts 
helps you spot any changes in them right away. Tell your healthcare provider 
about any changes.

Why is breast self-awareness important?

Many experts now say that women should focus on breast self-awareness instead of
doing a breast self-examination (BSE). These experts include the American Cancer
Society and the American Congress of Obstetricians and Gynecologists. Some 
experts even advise not teaching women to do a BSE. Thats because research 
hasnt shown a clear benefit to doing BSEs.

Breast self-awareness is different than a BSE. It isnt about following a 
certain method and schedule. Its about knowing what's normal for your 
breasts. That way you can spot even small changes right away. If you see any 
changes, tell your healthcare provider.

Changes to look for

Call your healthcare provider if you find any changes in your breasts that worry
you. These changes may be:

 A lump

 Nipple discharge other than breastmilk, especially if it's bloody

 Swelling

 A change in size or shape

 Skin changes, such as redness, thickening, or dimpling of the skin

 Swollen lymph nodes in the armpit

 Nipple problems, such as pain or redness

If you find a lump

Call your provider if you find lumpiness in one breast. Also call if you feel 
something different inthe tissue or feel a definite lump. Sometimes lumpiness 
may be due to menstrual changes. But there may be reason for concern.

Your provider may want to see you right away if you have:

 Nipple discharge that is bloody

 Skin changes on your breast, such as dimpling or puckering

Its okay to be upset if you find a lump. Be sure to call your provider right 
away. Remember that most breast lumps are benign. This means they are not 
cancer.

[employer] last reviewed this educational content on 2017-2020 [employer]. [address] [place] [address] 
[address]. All rights reserved. This information is not intended as a substitute for
professional medical care. Always follow your healthcare professional's 
instructions.







Patient Education



Understanding USDA MyPlate

The USDA (U.S. Department of Agriculture) has guidelines to help you make 
healthy food choices. These are called MyPlate. MyPlate shows the food groups 
that make up healthy meals using the image of a place setting. Before you eat, 
think about the healthiest choices for what to put onto your plate or into your 
cup or bowl. To learn more about building a healthy plate, visit 
www.choosemyplate.gov.



The food groups

 Fruits. Any fruit or 100% fruit juice counts as part of the Fruit Group. 
Fruits may be fresh, canned, frozen, or dried, and may be whole, cut-up, or 
pureed. Make half your plate fruits and vegetables.

 Vegetables. Any vegetable or 100% vegetable juice counts as a member of the 
Vegetable Group. Vegetables may be fresh, frozen, canned, or dried. They can be 
served raw or cooked and may be whole, cut-up, or mashed. Make half your plate 
fruits and vegetables.

 Grains. All foods made from grains are part of the Grains Group. These 
include wheat, rice, oats,cornmeal, and barley such as bread, pasta, oatmeal, 
cereal, tortillas, and grits. Grains should be no more than a quarter of your 
plate. At least half of your grains should be whole grains.

 Protein. This group includes meat, poultry, seafood, beans and peas, eggs, 
processed soy products(like tofu), nuts (including nut butters), and seeds. Make
protein choices no more than a quarter ofyour plate. Meat and poultry choices 
should be lean or low fat.

 Dairy. All fluid milk products and foods made from milk that contain calcium,
like yogurt and cheese, are part of the Dairy Group. (Foods that have little 
calcium, such as cream, butter, and cream cheese, are not part of the group.) 
Most dairy choices should be low-fat or fat-free.

 Oils. These are fats that are liquid at room temperature. They include 
canola, corn, olive, soybean, and sunflower oil. Foods that are mainly oil 
include mayonnaise, certain salad dressings, and soft margarines. You should 
have only 5 to 7 teaspoons of oils a day. You probably already get this muchfrom
the food you eat.

[employer] last reviewed this educational content on 2017-2020 [employer]. [address] [place] [address] 
[address]. All rights reserved. This information is not intended as a substitute for
professional medical care. Always follow your healthcare professional's 
instructions.







Patient Education



The Range of Pap Test Results

When your Pap test is sent to the lab, the lab studies your cell samples and 
reports any abnormal cell changes. Your healthcare provider can discuss these 
changes with you. In some cases, an abnormal Pap test is due to an infection. 
More serious cell changes range from dysplasia to cancer. Talk to your 
healthcare provider about your Pap test.



Normal results

Cervical cells, even normal ones, are always changing. As they mature, normal 
squamous cells move from deeper layers within the cervix. Over time, these cells
flatten and cover the surface of the cervix. Within the cervical canal, the 
cells are different. These glandular cells are taller and not as flat as the 
cells on the surface of the cervix. When a Pap test sample shows healthy cells 
of both types, the results are negative. Keep having Pap tests as often as 
directed.

Abnormal results

A positive Pap test result means some cells in the sample showed abnormal 
changes. These results aregrouped by the type of cell change and the location, 
or extent, of the changes. Depending on the results, you may need further 
testing.

 Inflammation. Noncancerous changes are present. They may be due to normal 
cell repair. Or, they may be caused by an infection, such as HPV or yeast. 
Further testing may be needed. (Also called reactive cellular changes.)

 Atypical squamous cells. Test results are unclear. Cells on the surface of 
the cervix show changes, but their significance is not yet known. Testing for 
HPV and other sexually transmitted infections(STIs) may be needed. Treatment may
be required. (Reported as ASC-US or ASC-H.)

 Atypical glandular cells. Cells lining the cervical canal show abnormal 
changes. Further testing is likely. You may also have treatment to destroy or 
remove problem cells. (Reported as AGC.)

 Mild dysplasia. Cells show distinct changes. More testing or HPV typing may 
be done. You may alsohave treatment to destroy or remove problem cells. 
(Reported as low-grade JARRETT or VERONICA 1.)

 Moderate to severe dysplasia. Cells show precancerous changes. Or, 
noninvasive cancer (carcinoma in situ) may be present. Treatment to destroy or 
remove problem cells is likely. (Reported as high-grade JARRETT or VERONICA 2 or VERONICA 3.)

 Cancer. Different types of cancer may be detected by your Pap test. More 
tests to assess the cancer's extent are likely. The type of treatment will 
depend on the test results and other factors, suchas age and health history. 
(Reported as squamous cell carcinoma, endocervical adenocarcinoma in situ, or 
adenocarcinoma.)

[employer] last reviewed this educational content on 2017-2020 [employer]. [address] [place] [address], [place] [address]. All rights reserved. This information is not intended as a substitute for
professional medical care. Always follow your healthcare professional's 
instructions.





Electronically signed by Martin Greer RN at 2020  9:55 AM CDT

documented in this encounter



Progress Notes

Shravan Sidhu, RAYP - 2020  9:45 AM CDTFormatting of this note might 
be different from the original.

Chief complaint:

Chief Complaint

Patient presents with

 Initial Prenatal Visit



HPI

.CC: Initial Prenatal Visit



Temi Cartagena is a 25 year old, , /White female. 
Patient's last menstrual period was 2020 (approximate).  She is 10w2d with
an intrauterine pregnancy. Her Estimated Date of Delivery: 21.  She is 
being seen today for her first obstetrical visit. Patient complains of nausea 
and vomiting since finding out of pregnancy, but was placed on medication at ER 
visit  Patient is unsure of LMP but know it was before 2020. Patient 
reports H/O Depression no medication dn denies nay symptoms today. She reports 
+FM and denies contractions, LOF and bleeding today.Patient denies current or 
past physical, sexual or emotional abuse.



OB History

 Para Term  AB Living

3 2 2     2

SAB TAB Ectopic Multiple Live Births

        2



# Outcome Date GA Lbr Sánchez/2nd Weight Sex Delivery Anes PTL Lv

3 Current

2 Term 19 40w1d   F Vag-Spont   REINA

1 Term 10/23/16 41w0d   F VAGINAL   REINA



Histories

OB History

 Para Term  AB Living

3 2 2     2

SAB TAB Ectopic Multiple Live Births

        2



# Outcome Date GA Lbr Sánchez/2nd Weight Sex Delivery Anes PTL Lv

3 Current

2 Term 19 40w1d   F Vag-Spont   REINA

1 Term 10/23/16 41w0d   F VAGINAL   REINA



Past Medical History:

Diagnosis Date

 Anxiety

 no meds

 Atypical squamous cells of undetermined significance (ASCUS) on Papanicolaou
smear of cervix 2018

 BV (bacterial vaginosis) 3/9/2016

 Depression

 denies si/hi, no meds

 Genital herpes 

 Insomnia

 no meds

 Marijuana use 2018

 Ovarian cyst



Family History

Problem Relation Age of Onset

 Diabetes Maternal Grandmother

 Heart Maternal Grandmother

 High cholesterol Maternal Grandmother

 Hypertension Maternal Grandmother

 Depression Mother

 Cancer Mother

     Thyroid

 Anxiety Mother

 Cancer Sister

     cervical

 Arthritis NoFHx

 Asthma NoFHx

 Birth defects NoFHx

 Breast Cancer NoFHx

 Colon Cancer NoFHx

 Ovarian Cancer NoFHx

 Genetic NoFHx

 Mental retardation NoFHx

 Neurological NoFHx

 Osteoporosis NoFHx

 Psychiatry NoFHx

 Other - see comments NoFHx



Family Status

Relation Name Status

 MGMo  (Not Specified)

 Mo  (Not Specified)

 Sis  (Not Specified)

 NoFHx  (Not Specified)



History reviewed. No pertinent surgical history.

Social History



Socioeconomic History

 Marital status: Single

  Spouse name: Not on file

 Number of children: Not on file

 Years of education: Not on file

 Highest education level: Not on file

Occupational History

 Not on file

Social Needs

 Financial resource strain: Not on file

 Food insecurity:

  Worry: Not on file

  Inability: Not on file

 Transportation needs:

  Medical: Not on file

  Non-medical: Not on file

Tobacco Use

 Smoking status: Former Smoker

 Smokeless tobacco: Never Used

 Tobacco comment: quit 2020 was smoking 1 pack per day

Substance and Sexual Activity

 Alcohol use: No

  Alcohol/week: 0.0 standard drinks

 Drug use: Yes

  Types: Marijuana

  Comment: 2018

 Sexual activity: Yes

  Partners: Male

  Comment: Last sexual intercourse 2020

Lifestyle

 Physical activity:

  Days per week: Not on file

  Minutes per session: Not on file

 Stress: Not on file

Relationships

 Social connections:

  Talks on phone: Not on file

  Gets together: Not on file

  Attends Holiness service: Not on file

  Active member of club or organization: Not on file

  Attends meetings of clubs or organizations: Not on file

  Relationship status: Not on file

 Intimate partner violence:

  Fear of current or ex partner: Not on file

  Emotionally abused: Not on file

  Physically abused: Not on file

  Forced sexual activity: Not on file

Other Topics Concern

 Not on file

Social History Narrative

 Pt denies current or past physical, sexual or emotional abuse.

 Pt has no pets.

 Pt lives with her parents.



Social History



Substance and Sexual Activity

Sexual Activity Yes

 Partners: Male

 Comment: Last sexual intercourse 2020



Genetic Screen

Autism / Mental Retardation: No

Canavan Disease: No

Congenital Heart Defect: No

Cystic Fibrosis: No

Down Syndrome: No

Familial Dysautonomia: No

Hemophilia or other Blood Disorders: No

[place] Chorea: No

Maternal Metabolic Disorder--specify (eg. Type 1 Diabetes, PKU): No

Muscular Dystrophy: No

Neural Tube Defect: No

Recurrent Pregnancy Loss or a Stillbirth: No

Sickle Cell Disease or Trait: No

Ab Sachs: No

Teratological Substances (specify type &amp; strength/dose) since LMP: No

Thalassemia: No

Other Inherited Genetic or Chromosomal Disorder (specify): No

No Significant History of Genetic Disorders: No Significant History of Genetic 
Disorders



Labs

Labs are pending.



Radiology

No new radiology.



Allergies

Temi is allergic to reglan [metoclopramide hcl].



Medications

Temi has a current medication list which includes the following 
prescription(s): prenatal vit 33-iron-folic-dha, ondansetron, and promethazine.



Review of Systems

Constitutional: Negative for activity change, appetite change, fatigue, 
unexpected weight change, weight gain and weight loss.

HENT: Negative for sore throat.

Eyes: Negative for visual disturbance.

Respiratory: Negative for cough and shortness of breath.

Breasts: Negative for discharge, mass, pain and unequal size.

Cardiovascular: Negative for chest pain, palpitations and leg swelling.

Gastrointestinal: Positive for nausea and vomiting. Negative for abdominal pain,
anal bleeding, blood in stool, constipation, diarrhea and rectal pain.

Genitourinary: Negative for bladder incontinence, dysuria, urgency, flank pain, 
vaginal bleeding, vaginal discharge, genital sores, vaginal pain and pelvic 
pain.

Skin: Negative for color change and rash.

Neurological: Negative.  Negative for dizziness, syncope and headaches.

Psychiatric/Behavioral: Negative for confusion, self-injury and sleep 
disturbance. The patient is not nervous/anxious.

Hematological: Negative for cold intolerance and heat intolerance.

Endocrine: Negative for hair loss, cold intolerance, heat intolerance, weight 
gain and weight loss.



/77 (BP Location: Right arm, Patient Position: Sitting, BP CUFF SIZE: 
Adult Medium)  | Pulse 76  | Temp 36.3 C (97.4 F) (Oral)  | Resp 16  | Ht 5'
5" (1.651 m)  | Wt 190 lb 8 oz (86.4 kg)  | LMP 2020 (Approximate)  | BMI 
31.70 kg/m

Pregravid BMI: 33.3



Physical Exam

Vitals reviewed.

Constitutional: She is oriented to person, place, and time. She appears well-
developed, well-nourished and well-groomed. She has no deformities.

Neck: No tenderness and no mass. No thyroid nodules and no thyromegaly palpated.

Cardiovascular: Regular rate and rhythm. No murmur auscultated.

Pulmonary/Chest: Breath sounds clear to auscultation. Normal inspiratory effort.

Abdominal: Abdomen is soft. No mass palpated. No tenderness present. There is no
guarding.

Neuro/Psychiatric: She has a normal mood and affect. She is oriented to person, 
place, and time.

Skin: Skin normal. No lesion and no rash present.





Tattoo present on bilateral arms



Breast: Right breast exhibits no mass, no nipple discharge and no tenderness. 
Left breast exhibits no mass, no nipple discharge and no tenderness. Normal left
breast and normal right breast

Rectal: normal rectum

External genitalia: Normal external genitalia appropriate for age. Normal hair 
distribution. No labial lesion.

     Chaperone present for the exam: MANUEL Kincaid Student

Vagina:Normal vagina. No lesion inspected. No abnormal vaginal discharge found.

Cervix: Normal cervix. No lesion. No tenderness and no discharge present.

Uterus: Uterus is normal size and non-tender.

     10cm Normal uterus

Adnexa: Right adnexa without tenderness or mass. Left adnexa without tenderness 
or mass. Normal leftadnexa and normal right adnexa

Anus/perineum: Normal perineum.



PRENATAL PHYSICAL:

General Exam:

HEENT: Normal

Thyroid: Normal

Lymph Node: Normal

Neurological: Normal

Abdomen: Normal

Skin: Normal

Extremities: Normal



Pelvic Exam:

Vulva: Normal

Vagina: Normal

    Chaperone present for the exam: MANUEL Kincaid Student

Cervix: Normal

Uterus: 8cm  Weeks

Adnexa:  Normal

Spines: Average

Sacrum: Concave

Subpubic Arch: Normal





Assessment/Plan

Supervision of high risk pregnancy, antepartum  (primary encounter diagnosis)

Multiparity

Comment: Routine NOB

Plan: POCT PREGNANCY TEST, POCT URINALYSIS W/O

      SPECIFIC GRAVITY, GLUCOSE 1 HOUR POST PRANDIAL,

      PAP Smear-Liquid Based, GC &amp; CHLAMYDIA

      AMPLIFIED ASSAY, TRICHOMONAS AMPLIFIED ASSAY,

      CBC WITH DIFF, HEPATITIS B SURFACE ANTIGEN, HIV

      1/2 AG-AB WITH REFLEX, POCT URINALYSIS W

      SPECIFIC GRAVITY, PRENATAL WORKUP, BLOOD BANK,

      RUBELLA SCREEN (TRENT) IGG, GALV ONLY -

      SYPHILIS IGG/IGM, URINE CULTURE, VZV ANTIBODY

      SCREEN, CONSULT MATERNAL FETAL MEDICINE

      ULTRASOUND Preferred Location: [place],

      prenatal vit 33-iron-folic-dha (SELECT-OB +

      DHA) 29 mg iron-1 mg -250 mg combo pack

      Denies zika virus risk, signs and symptoms such as fever,rash,joint pain, 
conjunctivitis (red eyes), muscle pain, headaches; outside [place] travel to areas 
affected by zika, and FOB exposure to zika.Educated on use of mosquito 
repellent. Covid x12 screening done, screening results are negative.



History of herpes simplex type 2 infection

Comment: hisotry

Plan: will treat at 36wks



Nausea and vomiting during pregnancy prior to 22 weeks gestation

Comment: on medication

Plan: will continue to monitor



History of depression

Comment: history of depression, denies any depression or any other thoughts or 
feelings or SI and HI

Plan: will continue to monitor



Tobacco abuse

Comment: stopped with pregnancy

Plan: smoking cessation



Class 1 obesity with body mass index (BMI) of 31.0 to 31.9 in adult, unspecified
obesity type, unspecified whether serious comorbidity present

BMI 31.0-31.9,adult

Comment: BMI: 31.70

Plan: Patient encouraged to limit weight gain and advised to eat healthy diet, 
fruits, vegetables, increased fiber and water intake and protein low in fat.  
Encouraged exercise for 30 min everyday; begin regimen with caution to prevent 
injury.  Encouraged to decrease BMI to &lt;25.



Return to clinic in 4 weeks.

Discussed treatment options.

Medications as ordered.

Reviewed patient instructions and provided printed copy.



This visit did not involve counseling and coordination that comprised more than 
50% of the visit time.

G[place]ER [place]  2020  10:52 AM

Electronically signed by Shravan Sidhu FNP at 2020 10:54 AM CDT
Martin Greer RN - 2020  9:45 AM CDTPatient is 25 year old female 
here for current pregnancy. Patient is .



1) Previous delivery methods   vaginal

2) Patient is mild/moderate experiencing cramping

3) Patient is not experiencing bleeding.

4) LMP   2020 approximate

5) Last Pap was: 2018 Results:   ASCUS

6) Have you had a flu vaccine this season?   NO

7) PPD candidate?   NO

8) Patient complains of  Severe nausea on meds from ED. Mild/Moderate Cramping

9) Patient denies history of physical, emotional, or sexual abuse. Patient 
states she currently feels  safe at home.



Electronically signed by Martin Greer RN at 2020 10:54 AM CDT
documented in this encounter



Plan of Treatment







             Date         Type         Specialty    Care Team    Description

 

                2020      Routine Prenatal Visit [facility]   IKE Sidhu, FNP



                                        



                                                                [address] [address]

15



                                        



                                                                [phone] (Fax) 









             Name         Type         Priority     Associated Diagnoses Order S

chedule

 

             GLUCOSE 1 HOUR POST LAB          Routine      Supervision of high r

isk Expected: 2020,



             PRANDIAL                               pregnancy, antepartum 

s: 2021

 

             PAP Smear-Liquid Based LAB          Routine      Supervision of hig

h risk Expected: 

2020,



                                                    pregnancy, antepartum 

s: 2021

 

             GC & CHLAMYDIA AMPLIFIED LAB          Routine      Supervision of h

igh risk Expected: 

2020,



             ASSAY                                  pregnancy, antepartum 

s: 2021

 

             TRICHOMONAS AMPLIFIED LAB          Routine      Supervision of high

 risk Expected: 2020,



             ASSAY                                  pregnancy, antepartum 

s: 2021

 

             CBC WITH DIFF LAB          Routine      Supervision of high risk Ex

pected: 2020,



                                                    pregnancy, antepartum 

s: 2021

 

             HEPATITIS B SURFACE LAB          Routine      Supervision of high r

isk Expected: 2020,



             ANTIGEN                                pregnancy, antepartum 

s: 2021

 

             HIV 1/2 AG-AB WITH LAB          Routine      Supervision of high ri

sk Expected: 2020,



             REFLEX                                 pregnancy, antepartum 

s: 2021

 

             POCT URINALYSIS W LAB          Routine      Supervision of high ris

k 20 Occurrences starting



             SPECIFIC GRAVITY                           pregnancy, antepartum  until



                                                                 2021

 

             PRENATAL WORKUP, BLOOD LAB          Routine      Supervision of hig

h risk Expected: 

2020,



             BANK                                   pregnancy, antepartum 

s: 2021

 

             RUBELLA SCREEN (TRENT) LAB          Routine      Supervision of hig

h risk Expected: 

2020,



             IGG                                    pregnancy, antepartum 

s: 2021

 

             GALV ONLY - SYPHILIS LAB          Routine      Supervision of high 

risk Expected: 2020,



             IGG/IGM                                pregnancy, antepartum 

s: 2021

 

             URINE CULTURE LAB          Routine      Supervision of high risk Ex

pected: 2020,



                                                    pregnancy, antepartum 

s: 2021

 

             VZV ANTIBODY SCREEN LAB          Routine      Supervision of high r

isk Expected: 2020,



                                                    pregnancy, antepartum 

s: 2021









                Health Maintenance Due Date        Last Done       Comments

 

                HPV VACCINES (1 - Female 2006                      



                2-dose series)                                  

 

                INFLUENZA VACCINE (#1) 2020,     



                                                10/18/2018,     



                                                2016      

 

                PAP SMEAR       2021      

 

                Depression Screening 2021,     



                                                2020      

 

                DTaP,Tdap,and Td Vaccines (3 2029,     



                - Td)                           2016      

 

                PNEUMOCOCCAL 0-64 YEARS Aged Out                        No longe

r eligible based



                COMBINED SERIES                                 on patient's age

 to



                                                                complete this to

pic



documented as of this encounter



Procedures







             Procedure Name Priority     Date/Time    Associated Diagnosis Comme

nts

 

             POCT URINALYSIS W/O Routine      2020 10:00 Supervision of hi

gh Results for 

this



             SPECIFIC GRAVITY              AM CDT       risk pregnancy, procedur

e are in



                                                    antepartum   the results



                                                                 section.

 

             POCT PREGNANCY TEST Routine      2020 10:00 Supervision of hi

gh Results for 

this



                                       AM CDT       risk pregnancy, procedure ar

e in



                                                    antepartum   the results



                                                                 section.



documented in this encounter



Results

POCT URINALYSIS W/O SPECIFIC GRAVITY (2020 10:00 AM CDT)





             Component    Value        Ref Range    Performed At [facility]

nature

 

             POCT PH U    5            5 - 8 mg/dl               

 

             POCT U LEUK EST Trace        Negative - Negative              

 

             POCT U NIT   Pos          Negative - Negative              

 

             POCT U PROT  Trace        Negative - Negative              

 

             POCT U GLU   Neg          Negative - Negative              

 

             POCT U KETONE 2+           Negative - Negative              

 

             POCT U BLD   Neg          Negative - Negative              









                                        Specimen

 

                                        Urine - URINE, CLEAN CATCH



POCT PREGNANCY TEST (2020 10:00 AM CDT)





             Component    Value        Ref Range    Performed At [facility]

nature

 

             POCT PREG    Positive                               

 

             On board controls acceptable Yes                                   

 



             with C Line                                         

 

             POCT PREG LOT #                                        

 

             POCT PREG TEST  DATE                                        









                                        Specimen

 

                                        Urine - URINE, CLEAN CATCH



documented in this encounter



Visit Diagnoses







                                        Diagnosis

 

                                        Supervision of high risk pregnancy, ante

partum - Primary

 

                                        Multiparity

 

                                        History of herpes simplex type 2 infecti

on







                                        Personal history of other infectious and

 parasitic disease

 

                                        Nausea and vomiting during pregnancy cintia

or to 22 weeks gestation

 

                                        History of depression







                                        Personal history of other mental disorde

r

 

                                        Tobacco abuse







                                        Tobacco use disorder

 

                                        Class 1 obesity with body mass index (BM

I) of 31.0 to 31.9 in adult, unspecified



                                        obesity type, unspecified whether seriou

s comorbidity present

 

                                        BMI 31.0-31.9,adult







                                        Body Mass Index 31.0-31.9, adult



documented in this encounter



Insurance







          Payer     Benefit Plan / Subscriber ID Effective Dates Phone     Addre

ss   Type



                    Group                                             

 

          [facility]      MEDICAID OF xxxxxxxxx 2020-Present [phone] [address] 

  Medicaid



                      [place]                                       [id number] [place] [address] 



                                                            [id number] 









           Guarantor Name Account Type Relation to Date of    Phone      Billing

 Address



                                 Patient    Birth                 

 

           Temi Cartagena Personal/Famil Self       1995 [phone]  [address]                                (Home)     [place]



                                                                  [id number]



documented as of this encounter



Advance Directives







                Name            Relationship    Healthcare Agent Communication



                                                Relationship    

 

                Andrew George Spouse          Primary healthcare agent [phone]



                                                                (Mobile)[phone] (Home)

 

                Vicky Deees  Mother          [facility] healthcare [phone]





                                                agent           (Mobile)[phone] (Home)

## 2020-08-21 NOTE — XMS REPORT
Summary of Care

                             Created on:2020



Patient:Temi Cartagena

Sex:Female

:1995

External Reference #:[id number]





Demographics







                          Address                   [address]



                                                    [place] [address]

 

                          Mobile Phone              [phone]

 

                          Home Phone                [phone]

 

                          Email Address             [email or web address]

[email or web address]

 

                          Preferred Language        English

 

                          Marital Status            Single

 

                          Taoism Affiliation     Unknown

 

                          Race                      White

 

                          Ethnic Group               or 









Author







                          Organization              [facility]

 

                          Address                   [address] [place] [address]









Support







                Name            Relationship    Address         Phone

 

                Andrew George Unavailable     [address] [phone]



                                                [place] [address] 

 

                Vicky Cartagena  Unavailable     [address] [phone]



                                                [place] [address] 









Care Team Providers







                    Name                Role                Phone

 

                    Pcp,  Does Not Have A Primary Care Provider [phone]









Encounter Details







             Date         Type         Department   Care Team    Description

 

                    2020          Abstract            [facility]

Shravan Morgan, FNP



                                        



                                                            [address] [facility]



                                        [address] [place] [address]

[address]



                                        [place] [address]



                                        



                                                [phone] (Fax) 







Allergies







             Active Allergy Reactions    Severity     Noted Date   Comments

 

             Metoclopramide Hcl Anxiety                   10/14/2018   



documented as of this encounter (statuses as of 2020)



Medications







          Medication Sig       Dispensed Refills   Start Date End Date  Status

 

          proMETHazine 25 mg Take 1 tablet 30 tablet 0         2018       

    Active



          tabletIndications: by mouth every                                     

    



          Nausea and vomiting 6 (six) hours                                     

    



          during pregnancy prior as needed for                                  

       



          to 22 weeks gestation Nausea and                                      

   



                    Vomiting (N/V).                                         

 

          ondansetron 4 mg Take 1 tablet 20 tablet 0         2019         

  Active



          disintegrating by mouth every                                         



          tabletIndications: 8 (eight) hours                                    

     



          Colitis   as needed for                                         



                    Nausea and                                         



                    Vomiting (N/V).                                         

 

          prenatal vit Take 1 Packet 30 Each   6         2020           Ac

tive



          33-iron-folic-dha by mouth daily.                                     

    



          (SELECT-OB + DHA) 29                                                  

 



          mg iron-1 mg -250 mg                                                  

 



          combo packIndications:                                                

   



          Supervision of high                                                   



          risk pregnancy,                                                   



          antepartum                                                   

 

          Nitrofurantoin&Nit. Take 1 capsule 20 capsule 0         2020 Active



          Macrocryst (MACROBID) by mouth 2                                      

   



          100 mg    (two) times                                         



          capsuleIndications: daily for 10                                      

   



          Urinary tract days.                                             



          infection without                                                   



          hematuria, site                                                   



          unspecified                                                   

 

          proMETHazine 25 mg Take 1 tablet 30 tablet 1         2020       

    Active



          tabletIndications: by mouth every                                     

    



          Nausea and vomiting 4 (four) hours                                    

     



          during pregnancy prior as needed for                                  

       



          to 22 weeks gestation Nausea and                                      

   



                    Vomiting (N/V).                                         



documented as of this encounter (statuses as of 2020)



Active Problems







                          Problem                   Noted Date

 

                          History of herpes simplex type 2 infection 2020

 

                          Tobacco abuse             2020

 

                          BMI 31.0-31.9,adult       2020

 

                          Glucose tolerance test abnormal 2019

 

                          12 weeks gestation of pregnancy 10/14/2018

 

                          Rubella non-immune status, antepartum 10/10/2018









                                        Overview: 







                                        Address in Postpartum









                          Dyspepsia                 10/03/2018

 

                          Class 1 obesity with body mass index (BMI) of 31.0 to 

31.9 in adult, 2018



                          unspecified obesity type, unspecified whether serious 

comorbidity present 

 

                          UTI (urinary tract infection) during pregnancy 

018









                                        Overview: 







                                        TONIE at next visit -neg









                          Supervision of high risk pregnancy in third trimester 

2018

 

                          Multiparity               2018

 

                          Marijuana use             2018









                                        Overview: 







                                        Reports due to nausea









                          Ovarian cyst              2018









                                        Overview: 







                                        Per outside usg see external provided re

cords









                          Atypical squamous cells of undetermined significance (

ASCUS) on 2018



                          Papanicolaou smear of cervix 









                                        Overview: 







                                        Repeat pap in 12 months









                          History of depression     2018

 

                          History of anxiety        2016

 

                          Nausea and vomiting during pregnancy prior to 22 weeks

 gestation 2016

 

                          Supervision of high risk pregnancy, antepartum 

016









                    Pregnant            Estimated Date of Delivery Comments

 

                    Yes                 2021          Based on last menstr

ual period of 2020



                                                            (Approximate)



documented as of this encounter (statuses as of 2020)



Resolved Problems







                    Problem             Noted Date          Resolved Date

 

                    Hyperemesis         10/06/2018          10/09/2018

 

                    Dehydration         2018          10/07/2018

 

                    11 weeks gestation of pregnancy 2018          10/09/20

18

 

                    Well woman exam     2018

 

                    Contraceptive management 2018

 

                    Nexplanon removal   2018









                                        Overview: 







                                        Per patient reports was only for 2 years









                    History of depression 2016

 

                    UTI in pregnancy, antepartum 2016









                                        Overview: 







                                        Still current uti as of 16









                    BV (bacterial vaginosis) 2016



documented as of this encounter (statuses as of 2020)



Immunizations







                    Name                Administration Dates Next Due

 

                    Influenza Virus Vaccine Quad .5 mL IM 6+ MO 2019, 10/1

2018 

 

                    Influenza Virus Vaccine Quad IM 3+ YRS 2016          

 

                    TDAP                2019          

 

                    TDAP (ADACEL) VACCINE 2016          



documented as of this encounter



Social History







             Tobacco Use  Types        Packs/Day    Years Used   Date

 

             Former Smoker                                        









                Smokeless Tobacco: Never Used                                 









                                        Comments: quit 2020 was smoking 1 

pack per day









                Alcohol Use     Drinks/Week     oz/Week         Comments

 

                No              0 Standard drinks or equivalent 0.0             









                    Pregnant            Estimated Date of Delivery Comments

 

                    Yes                 2021          Based on last menstr

ual period of 2020



                                                            (Approximate)









                          Sex Assigned at Birth     Date Recorded

 

                          Not on file               









                    Job Start Date      Occupation          Industry

 

                    Not on file         Not on file         Not on file









                    Travel History      Travel Start        Travel End









                                        No recent travel history available.









                    COVID-19 Exposure   Response            Date Recorded

 

                    In the last month, have you been in contact with No / Unsure

         2020  9:52 AM 

CDT



                    someone who was confirmed or suspected to have              

       



                    Coronavirus / COVID-19?                     



documented as of this encounter



Last Filed Vital Signs

Not on filedocumented in this encounter



Plan of Treatment







             Date         Type         Specialty    Care Team    Description

 

                2020      Routine Prenatal Visit OB Satellites   IKE Sidhu, FNP



                                        



                                                                [address] [address]

15



                                        



                                                                [phone] (Fax) 









                Health Maintenance Due Date        Last Done       Comments

 

                HPV VACCINES (1 - Female 2006                      



                2-dose series)                                  

 

                INFLUENZA VACCINE (#1) 2020,     



                                                10/18/2018,     



                                                2016      

 

                Depression Screening 2021,     



                                                2020      

 

                PAP SMEAR       2023,     



                                                2018      

 

                DTaP,Tdap,and Td Vaccines (3 2029,     



                - Td)                           2016      

 

                PNEUMOCOCCAL 0-64 YEARS Aged Out                        No longe

r eligible based



                COMBINED SERIES                                 on patient's age

 to



                                                                complete this to

pic



documented as of this encounter



Results

Not on filedocumented in this encounter



Insurance







          Payer     Benefit Plan / Subscriber ID Effective Dates Phone     Addre

ss   Type



                    Group                                             

 

          [employer] [place] 2020-Present [phone] [address] 

  Medicaid



                      [place]                                       [id number] [place] [address] 



                                                            [id number] 



documented as of this encounter



Advance Directives







                Name            Relationship    Healthcare Agent Communication



                                                Relationship    

 

                Andrew Arroyona Spouse          Primary healthcare agent [phone]



                                                                (Mobile)[phone] (Home)

 

                Vicky Cartagena  Mother          [facility] [id number]





                                                agent           (Mobile)[phone] (Home)

## 2020-08-21 NOTE — XMS REPORT
Summary of Care

                             Created on:2020



Patient:Temi Cartagena

Sex:Female

:1995

External Reference #:[id number]





Demographics







                          Address                   [address]



                                                    [place] [address]

 

                          Mobile Phone              [phone]

 

                          Home Phone                [phone]

 

                          Email Address             [email or web address]

[email or web address]

 

                          Preferred Language        English

 

                          Marital Status            Single

 

                          Confucianist Affiliation     Unknown

 

                          Race                      White

 

                          Ethnic Group               or 









Author







                          Organization              [facility]

 

                          Address                   [address] [place] [address]









Support







                Name            Relationship    Address         Phone

 

                Andrew George Unavailable     [address] [phone]



                                                [place] [address] 

 

                Vicky Cartagena  Unavailable     [address] [phone]



                                                [place] [address] 









Care Team Providers







                    Name                Role                Phone

 

                    Pcp,  Does Not Have A Primary Care Provider [phone]









Reason for Visit







                          Reason                    Comments

 

                          URINARY TRACT INFECTION   







Encounter Details







             Date         Type         Department   Care Team    Description

 

             2020   Telephone    [facility]- Shravan Sidhu, UR

INARY TRACT



                                                            [place]



                                        FN



                                        INFECTION



                                                [address] [facility] [address] [place] [address]



                                        



                                                [place]    [phone] [address] [phone] (Fax)        



                                       [phone]              







Allergies







             Active Allergy Reactions    Severity     Noted Date   Comments

 

             Metoclopramide Hcl Anxiety                   10/14/2018   



documented as of this encounter (statuses as of 2020)



Medications







          Medication Sig       Dispensed Refills   Start Date End Date  Status

 

          proMETHazine 25 mg Take 1 tablet 30 tablet 0         2018       

    Active



          tabletIndications: by mouth every                                     

    



          Nausea and vomiting 6 (six) hours                                     

    



          during pregnancy prior as needed for                                  

       



          to 22 weeks gestation Nausea and                                      

   



                    Vomiting (N/V).                                         

 

          ondansetron 4 mg Take 1 tablet 20 tablet 0         2019         

  Active



          disintegrating by mouth every                                         



          tabletIndications: 8 (eight) hours                                    

     



          Colitis   as needed for                                         



                    Nausea and                                         



                    Vomiting (N/V).                                         

 

          prenatal vit Take 1 Packet 30 Each   6         2020           Ac

tive



          33-iron-folic-dha by mouth daily.                                     

    



          (SELECT-OB + DHA) 29                                                  

 



          mg iron-1 mg -250 mg                                                  

 



          combo packIndications:                                                

   



          Supervision of high                                                   



          risk pregnancy,                                                   



          antepartum                                                   

 

          Nitrofurantoin&Nit. Take 1 capsule 20 capsule 0         2020 Active



          Macrocryst (MACROBID) by mouth 2                                      

   



          100 mg    (two) times                                         



          capsuleIndications: daily for 10                                      

   



          Urinary tract days.                                             



          infection without                                                   



          hematuria, site                                                   



          unspecified                                                   



documented as of this encounter (statuses as of 2020)



Active Problems







                          Problem                   Noted Date

 

                          History of herpes simplex type 2 infection 2020

 

                          Tobacco abuse             2020

 

                          BMI 31.0-31.9,adult       2020

 

                          Glucose tolerance test abnormal 2019

 

                          12 weeks gestation of pregnancy 10/14/2018

 

                          Rubella non-immune status, antepartum 10/10/2018









                                        Overview: 







                                        Address in Postpartum









                          Dyspepsia                 10/03/2018

 

                          Class 1 obesity with body mass index (BMI) of 31.0 to 

31.9 in adult, 2018



                          unspecified obesity type, unspecified whether serious 

comorbidity present 

 

                          UTI (urinary tract infection) during pregnancy 

018









                                        Overview: 







                                        TONIE at next visit -neg









                          Supervision of high risk pregnancy in third trimester 

2018

 

                          Multiparity               2018

 

                          Marijuana use             2018









                                        Overview: 







                                        Reports due to nausea









                          Ovarian cyst              2018









                                        Overview: 







                                        Per outside usg see external provided re

cords









                          Atypical squamous cells of undetermined significance (

ASCUS) on 2018



                          Papanicolaou smear of cervix 









                                        Overview: 







                                        Repeat pap in 12 months









                          History of depression     2018

 

                          History of anxiety        2016

 

                          Nausea and vomiting during pregnancy prior to 22 weeks

 gestation 2016

 

                          Supervision of high risk pregnancy, antepartum 

016









                    Pregnant            Estimated Date of Delivery Comments

 

                    Yes                 2021          Based on last menstr

ual period of 2020



                                                            (Approximate)



documented as of this encounter (statuses as of 2020)



Resolved Problems







                    Problem             Noted Date          Resolved Date

 

                    Hyperemesis         10/06/2018          10/09/2018

 

                    Dehydration         2018          10/07/2018

 

                    11 weeks gestation of pregnancy 2018          10/09/20

18

 

                    Well woman exam     2018

 

                    Contraceptive management 2018

 

                    Nexplanon removal   2018









                                        Overview: 







                                        Per patient reports was only for 2 years









                    History of depression 2016

 

                    UTI in pregnancy, antepartum 2016









                                        Overview: 







                                        Still current uti as of 16









                    BV (bacterial vaginosis) 2016



documented as of this encounter (statuses as of 2020)



Immunizations







                    Name                Administration Dates Next Due

 

                    Influenza Virus Vaccine Quad .5 mL IM 6+ MO 2019, 10/1

2018 

 

                    Influenza Virus Vaccine Quad IM 3+ YRS 2016          

 

                    TDAP                2019          

 

                    TDAP (ADACEL) VACCINE 2016          



documented as of this encounter



Social History







             Tobacco Use  Types        Packs/Day    Years Used   Date

 

             Former Smoker                                        









                Smokeless Tobacco: Never Used                                 









                                        Comments: quit 2020 was smoking 1 

pack per day









                Alcohol Use     Drinks/Week     oz/Week         Comments

 

                No              0 Standard drinks or equivalent 0.0             









                    Pregnant            Estimated Date of Delivery Comments

 

                    Yes                 2021          Based on last menstr

ual period of 2020



                                                            (Approximate)









                          Sex Assigned at Birth     Date Recorded

 

                          Not on file               









                    Job Start Date      Occupation          Industry

 

                    Not on file         Not on file         Not on file









                    Travel History      Travel Start        Travel End









                                        No recent travel history available.









                    COVID-19 Exposure   Response            Date Recorded

 

                    In the last month, have you been in contact with No / Unsure

         2020  9:52 AM 

CDT



                    someone who was confirmed or suspected to have              

       



                    Coronavirus / COVID-19?                     



documented as of this encounter



Last Filed Vital Signs

Not on filedocumented in this encounter



Plan of Treatment







             Date         Type         Specialty    Care Team    Description

 

             2020   Technician Visit Maternal Fetal              



                                       Medicine                  

 

             2020   Routine Prenatal Visit [facility] Shravan Sidhu, 



                                                                FNP



                                        



                                                    [address] [address]

15



                                        



                                                                [phone] (Fax) 









                Health Maintenance Due Date        Last Done       Comments

 

                HPV VACCINES (1 - Female 2006                      



                2-dose series)                                  

 

                INFLUENZA VACCINE (#1) 2020,     



                                                10/18/2018,     



                                                2016      

 

                Depression Screening 2021,     



                                                2020      

 

                PAP SMEAR       2023,     



                                                2018      

 

                DTaP,Tdap,and Td Vaccines (3 2029,     



                - Td)                           2016      

 

                PNEUMOCOCCAL 0-64 YEARS Aged Out                        No longe

r eligible based



                COMBINED SERIES                                 on patient's age

 to



                                                                complete this to

pic



documented as of this encounter



Results

Not on filedocumented in this encounter



Visit Diagnoses







                                        Diagnosis

 

                                        Urinary tract infection without hematuri

a, site unspecified - Primary



documented in this encounter



Insurance







          Payer     Benefit Plan / Subscriber ID Effective Dates Phone     Addre

ss   Type



                    Group                                             

 

          [employer] [place] 2020-Present [phone] [address] 

  Medicaid



                      [place]                                       [id number] [place] [address] 



                                                            [id number] 



documented as of this encounter



Advance Directives







                Name            Relationship    Healthcare Agent Communication



                                                Relationship    

 

                Andrew George Spouse          Primary healthcare agent [phone]



                                                                (Mobile)[phone] (Home)

 

                Vicky Cartagena  Mother          [facility] [phone]





                                                agent           (Mobile)[phone] (Home)

## 2020-08-21 NOTE — XMS REPORT
Summary of Care

                             Created on:2020



Patient:Temi Cartagena

Sex:Female

:1995

External Reference #:[id number]





Demographics







                          Address                   [address]



                                                    [place] [address]

 

                          Mobile Phone              [phone]

 

                          Home Phone                [phone]

 

                          Email Address             [email or web address]

[email or web address]

 

                          Preferred Language        English

 

                          Marital Status            Single

 

                          Rastafari Affiliation     Unknown

 

                          Race                      White

 

                          Ethnic Group               or 









Author







                          Organization              [facility]

 

                          Address                   [address] [place] [id number]









Support







                Name            Relationship    Address         Phone

 

                Andrew George Unavailable     [address] [phone]



                                                [place] [address] 

 

                Vicky Cartagena  Unavailable     [address] [phone]



                                                [place] [address] 









Care Team Providers







                    Name                Role                Phone

 

                    Pcp,  Does Not Have A Primary Care Provider [phone]









Reason for Visit







                          Reason                    Comments

 

                          Abnormal Lab              1hr ABN







Encounter Details







             Date         Type         Department   Care Team    Description

 

             2020   Telephone    [facility] ELISE- Shravan Sidhu Ab

normal Lab (1hr 

ABN)



                                                            [address] [address]



                                        



                                                [place]    [phone] [address] [phone] (Fax)        



                                       [phone]              







Allergies







             Active Allergy Reactions    Severity     Noted Date   Comments

 

             Metoclopramide Hcl Anxiety                   10/14/2018   



documented as of this encounter (statuses as of 2020)



Medications







          Medication Sig       Dispensed Refills   Start Date End Date  Status

 

          proMETHazine 25 mg Take 1 tablet by 30 tablet 0         2018    

       Active



          tabletIndications: mouth every 6                                      

   



          Nausea and vomiting (six) hours as                                    

     



          during pregnancy prior needed for Nausea                              

           



          to 22 weeks gestation and Vomiting                                    

     



                    (N/V).                                            

 

          ondansetron 4 mg Take 1 tablet by 20 tablet 0         2019      

     Active



          disintegrating mouth every 8                                         



          tabletIndications: (eight) hours as                                   

      



          Colitis   needed for Nausea                                         



                    and Vomiting                                         



                    (N/V).                                            

 

          prenatal vit Take 1 Packet by 30 Each   6         2020          

 Active



          33-iron-folic-dha mouth daily.                                        

 



          (SELECT-OB + DHA) 29 mg                                               

    



          iron-1 mg -250 mg combo                                               

    



          packIndications:                                                   



          Supervision of high                                                   



          risk pregnancy,                                                   



          antepartum                                                   



documented as of this encounter (statuses as of 2020)



Active Problems







                          Problem                   Noted Date

 

                          History of herpes simplex type 2 infection 2020

 

                          Tobacco abuse             2020

 

                          BMI 31.0-31.9,adult       2020

 

                          Glucose tolerance test abnormal 2019

 

                          12 weeks gestation of pregnancy 10/14/2018

 

                          Rubella non-immune status, antepartum 10/10/2018

 

                          Dyspepsia                 10/03/2018

 

                          Class 1 obesity with body mass index (BMI) of 31.0 to 

31.9 in adult, 2018



                          unspecified obesity type, unspecified whether serious 

comorbidity present 

 

                          UTI (urinary tract infection) during pregnancy 

018









                                        Overview: 







                                        TONIE at next visit -neg









                          Supervision of high risk pregnancy in third trimester 

2018

 

                          Multiparity               2018

 

                          Marijuana use             2018









                                        Overview: 







                                        Reports due to nausea









                          Ovarian cyst              2018









                                        Overview: 







                                        Per outside [facility] see external provided re

cords









                          Atypical squamous cells of undetermined significance (

ASCUS) on 2018



                          Papanicolaou smear of cervix 









                                        Overview: 







                                        Repeat pap in 12 months









                          History of depression     2018

 

                          History of anxiety        2016

 

                          Nausea and vomiting during pregnancy prior to 22 weeks

 gestation 2016

 

                          Supervision of high risk pregnancy, antepartum 

016









                    Pregnant            Estimated Date of Delivery Comments

 

                    Yes                 2021          Based on last menstr

ual period of 2020



                                                            (Approximate)



documented as of this encounter (statuses as of 2020)



Resolved Problems







                    Problem             Noted Date          Resolved Date

 

                    Hyperemesis         10/06/2018          10/09/2018

 

                    Dehydration         2018          10/07/2018

 

                    11 weeks gestation of pregnancy 2018          10/09/20

18

 

                    Well woman exam     2018

 

                    Contraceptive management 2018

 

                    Nexplanon removal   2018









                                        Overview: 







                                        Per patient reports was only for 2 years









                    History of depression 2016

 

                    UTI in pregnancy, antepartum 2016









                                        Overview: 







                                        Still current uti as of 5--16









                    BV (bacterial vaginosis) 2016



documented as of this encounter (statuses as of 2020)



Immunizations







                    Name                Administration Dates Next Due

 

                    Influenza Virus Vaccine Quad .5 mL IM 6+ MO 2019, 10/1

2018 

 

                    Influenza Virus Vaccine Quad IM 3+ YRS 2016          

 

                    TDAP                2019          

 

                    TDAP (ADACEL) VACCINE 2016          



documented as of this encounter



Social History







             Tobacco Use  Types        Packs/Day    Years Used   Date

 

             Former Smoker                                        









                Smokeless Tobacco: Never Used                                 









                                        Comments: quit 2020 was smoking 1 

pack per day









                Alcohol Use     Drinks/Week     oz/Week         Comments

 

                No              0 Standard drinks or equivalent 0.0             









                    Pregnant            Estimated Date of Delivery Comments

 

                    Yes                 2021          Based on last menstr

ual period of 2020



                                                            (Approximate)









                          Sex Assigned at Birth     Date Recorded

 

                          Not on file               









                    Job Start Date      Occupation          Industry

 

                    Not on file         Not on file         Not on file









                    Travel History      Travel Start        Travel End









                                        No recent travel history available.









                    COVID-19 Exposure   Response            Date Recorded

 

                    In the last month, have you been in contact with No / Unsure

         2020  9:52 AM 

CDT



                    someone who was confirmed or suspected to have              

       



                    Coronavirus / COVID-19?                     



documented as of this encounter



Last Filed Vital Signs

Not on filedocumented in this encounter



Plan of Treatment







             Date         Type         Specialty    Care Team    Description

 

             2020   Technician Visit Maternal Fetal              



                                       Medicine                  

 

             2020   Routine Prenatal Visit OB [facility]ell[facility] Shravan Sidhu, 



                                                                FNP



                                        



                                                    [address] [place], [place] [address]

[address]



                                        



                                                                [phone] (Fax) 









             Name         Type         Priority     Associated Diagnoses Order S

chedule

 

             3 HR GLUCOSE TOLERANCE LAB          Routine      Abnormal maternal 

glucose Expected: 

2020,



             PANEL                                  tolerance, antepartum 

s: 2021









                Health Maintenance Due Date        Last Done       Comments

 

                HPV VACCINES (1 - Female 2006                      



                2-dose series)                                  

 

                INFLUENZA VACCINE (#1) 2020,     



                                                10/18/2018,     



                                                2016      

 

                PAP SMEAR       2021      

 

                Depression Screening 2021,     



                                                2020      

 

                DTaP,Tdap,and Td Vaccines (3 2029,     



                - Td)                           2016      

 

                PNEUMOCOCCAL 0-64 YEARS Aged Out                        No longe

r eligible based



                COMBINED SERIES                                 on patient's age

 to



                                                                complete this to

pic



documented as of this encounter



Results

Not on filedocumented in this encounter



Visit Diagnoses







                                        Diagnosis

 

                                        Abnormal maternal glucose tolerance, ant

epartum - Primary



documented in this encounter



Insurance







          Payer     Benefit Plan / Subscriber ID Effective Dates Phone     Addre

ss   Type



                    Group                                             

 

          [employer] [place] 2020-Present [phone] [address] 

  Medicaid



                      [place]                                       [id number] [place] [address] 



                                                            [id number] 



documented as of this encounter



Advance Directives







                Name            Relationship    Healthcare Agent Communication



                                                Relationship    

 

                Andrew George Spouse          Primary healthcare agent [phone]



                                                                [phone])[phone] (Home)

 

                Vicky Cartagena  Mother          [place] [id number]





                                                agent           (Mobile)[phone] (Home)

## 2020-08-21 NOTE — XMS REPORT
Summary of Care

                             Created on:2020



Patient:Temi Cartagena

Sex:Female

:1995

External Reference #:[id number]





Demographics







                          Address                   [address]



                                                    [place] [address]

 

                          Mobile Phone              [phone]

 

                          Home Phone                [phone]

 

                          Email Address             [email or web address]

[email or web address]

 

                          Preferred Language        English

 

                          Marital Status            Single

 

                          Shinto Affiliation     Unknown

 

                          Race                      White

 

                          Ethnic Group               or 









Author







                          Organization              [facility]

 

                          Address                   [address] [place] [id number]









Support







                Name            Relationship    Address         Phone

 

                Andrew George Unavailable     [address] [phone]



                                                [place] [address] 

 

                Vicky Cartagena  Unavailable     [address] [phone]



                                                [place] [address] 









Care Team Providers







                    Name                Role                Phone

 

                    Pcp,  Does Not Have A Primary Care Provider [phone]









Reason for Visit







                          Reason                    Comments

 

                          Abnormal Lab              1hr ABN







Encounter Details







             Date         Type         Department   Care Team    Description

 

             2020   Telephone    [facility] ELISE- Shravan Sidhu Ab

normal Lab (1hr 

ABN)



                                                            [address] [address]



                                        



                                                [place]    [phone] [address] [phone] (Fax)        



                                       [phone]              







Allergies







             Active Allergy Reactions    Severity     Noted Date   Comments

 

             Metoclopramide Hcl Anxiety                   10/14/2018   



documented as of this encounter (statuses as of 2020)



Medications







          Medication Sig       Dispensed Refills   Start Date End Date  Status

 

          proMETHazine 25 mg Take 1 tablet by 30 tablet 0         2018    

       Active



          tabletIndications: mouth every 6                                      

   



          Nausea and vomiting (six) hours as                                    

     



          during pregnancy prior needed for Nausea                              

           



          to 22 weeks gestation and Vomiting                                    

     



                    (N/V).                                            

 

          ondansetron 4 mg Take 1 tablet by 20 tablet 0         2019      

     Active



          disintegrating mouth every 8                                         



          tabletIndications: (eight) hours as                                   

      



          Colitis   needed for Nausea                                         



                    and Vomiting                                         



                    (N/V).                                            

 

          prenatal vit Take 1 Packet by 30 Each   6         2020          

 Active



          33-iron-folic-dha mouth daily.                                        

 



          (SELECT-OB + DHA) 29 mg                                               

    



          iron-1 mg -250 mg combo                                               

    



          packIndications:                                                   



          Supervision of high                                                   



          risk pregnancy,                                                   



          antepartum                                                   



documented as of this encounter (statuses as of 2020)



Active Problems







                          Problem                   Noted Date

 

                          History of herpes simplex type 2 infection 2020

 

                          Tobacco abuse             2020

 

                          BMI 31.0-31.9,adult       2020

 

                          Glucose tolerance test abnormal 2019

 

                          12 weeks gestation of pregnancy 10/14/2018

 

                          Rubella non-immune status, antepartum 10/10/2018









                                        Overview: 







                                        Address in Postpartum









                          Dyspepsia                 10/03/2018

 

                          Class 1 obesity with body mass index (BMI) of 31.0 to 

31.9 in adult, 2018



                          unspecified obesity type, unspecified whether serious 

comorbidity present 

 

                          UTI (urinary tract infection) during pregnancy 

018









                                        Overview: 







                                        TONIE at next visit -neg









                          Supervision of high risk pregnancy in third trimester 

2018

 

                          Multiparity               2018

 

                          Marijuana use             2018









                                        Overview: 







                                        Reports due to nausea









                          Ovarian cyst              2018









                                        Overview: 







                                        Per outside usg see external provided re

cords









                          Atypical squamous cells of undetermined significance (

ASCUS) on 2018



                          Papanicolaou smear of cervix 









                                        Overview: 







                                        Repeat pap in 12 months









                          History of depression     2018

 

                          History of anxiety        2016

 

                          Nausea and vomiting during pregnancy prior to 22 weeks

 gestation 2016

 

                          Supervision of high risk pregnancy, antepartum 

016









                    Pregnant            Estimated Date of Delivery Comments

 

                    Yes                 2021          Based on last menstr

ual period of 2020



                                                            (Approximate)



documented as of this encounter (statuses as of 2020)



Resolved Problems







                    Problem             Noted Date          Resolved Date

 

                    Hyperemesis         10/06/2018          10/09/2018

 

                    Dehydration         2018          10/07/2018

 

                    11 weeks gestation of pregnancy 2018          10/09/20

18

 

                    Well woman exam     2018

 

                    Contraceptive management 2018

 

                    Nexplanon removal   2018









                                        Overview: 







                                        Per patient reports was only for 2 years









                    History of depression 2016

 

                    UTI in pregnancy, antepartum 2016









                                        Overview: 







                                        Still current uti as of 5-6-16









                    BV (bacterial vaginosis) 2016



documented as of this encounter (statuses as of 2020)



Immunizations







                    Name                Administration Dates Next Due

 

                    Influenza Virus Vaccine Quad .5 mL IM 6+ MO 2019, 10/1

2018 

 

                    Influenza Virus Vaccine Quad IM 3+ YRS 2016          

 

                    TDAP                2019          

 

                    TDAP (ADACEL) VACCINE 2016          



documented as of this encounter



Social History







             Tobacco Use  Types        Packs/Day    Years Used   Date

 

             Former Smoker                                        









                Smokeless Tobacco: Never Used                                 









                                        Comments: quit 2020 was smoking 1 

pack per day









                Alcohol Use     Drinks/Week     oz/Week         Comments

 

                No              0 Standard drinks or equivalent 0.0             









                    Pregnant            Estimated Date of Delivery Comments

 

                    Yes                 2021          Based on last menstr

ual period of 2020



                                                            (Approximate)









                          Sex Assigned at Birth     Date Recorded

 

                          Not on file               









                    Job Start Date      Occupation          Industry

 

                    Not on file         Not on file         Not on file









                    Travel History      Travel Start        Travel End









                                        No recent travel history available.









                    COVID-19 Exposure   Response            Date Recorded

 

                    In the last month, have you been in contact with No / Unsure

         2020  9:52 AM 

CDT



                    someone who was confirmed or suspected to have              

       



                    Coronavirus / COVID-19?                     



documented as of this encounter



Last Filed Vital Signs

Not on filedocumented in this encounter



Plan of Treatment







             Date         Type         Specialty    Care Team    Description

 

             2020   Technician Visit Maternal Fetal              



                                       Medicine                  

 

             2020   Routine Prenatal Visit OB Satellites Shravan Sidhu, 



                                                                P



                                        



                                                    [address] [address]

15



                                        



                                                                [phone] (Fax) 









             Name         Type         Priority     Associated Diagnoses Order S

chedule

 

             3 HR GLUCOSE TOLERANCE LAB          Routine      Abnormal maternal 

glucose Expected: 

2020,



             PANEL                                  tolerance, antepartum 

s: 2021









                Health Maintenance Due Date        Last Done       Comments

 

                HPV VACCINES (1 - Female 2006                      



                2-dose series)                                  

 

                INFLUENZA VACCINE (#1) 2020,     



                                                10/18/2018,     



                                                2016      

 

                PAP SMEAR       2021      

 

                Depression Screening 2021,     



                                                2020      

 

                DTaP,Tdap,and Td Vaccines (3 2029,     



                - Td)                           2016      

 

                PNEUMOCOCCAL 0-64 YEARS Aged Out                        No longe

r eligible based



                COMBINED SERIES                                 on patient's age

 to



                                                                complete this to

pic



documented as of this encounter



Results

Not on filedocumented in this encounter



Visit Diagnoses







                                        Diagnosis

 

                                        Abnormal maternal glucose tolerance, ant

epartum - Primary



documented in this encounter



Insurance







          Payer     Benefit Plan / Subscriber ID Effective Dates Phone     Addre

ss   Type



                    Group                                             

 

          [employer] [place] 2020-Present [phone] [address] 

  Medicaid



                      [place]                                       [id number] [place] [address] 



                                                            [address] 



documented as of this encounter



Advance Directives







                Name            Relationship    Healthcare Agent Communication



                                                Relationship    

 

                Andrew George Spouse          Primary healthcare agent [phone]



                                                                (Mobile)[phone] (Home)

 

                Vicky Cartagena  Mother          [id number]





                                                agent           (Mobile)[phone] (Home)

## 2020-08-21 NOTE — XMS REPORT
Summary of Care

                             Created on:2020



Patient:Temi Cartagena

Sex:Female

:1995

External Reference #:[id number]





Demographics







                          Address                   [address]



                                                    [place] [address]

 

                          Mobile Phone              [phone]

 

                          Home Phone                [phone]

 

                          Email Address             [email or web address]

[email or web address]

 

                          Preferred Language        English

 

                          Marital Status            Single

 

                          Christianity Affiliation     Unknown

 

                          Race                      White

 

                          Ethnic Group               or 









Author







                          Organization              [facility]

 

                          Address                   [address] [place] [id number]









Support







                Name            Relationship    Address         Phone

 

                Andrew George Unavailable     [address] [phone]



                                                [place] [address] 

 

                Vicky Cartagena  Unavailable     [address] [phone]



                                                [place] [address] 









Care Team Providers







                    Name                Role                Phone

 

                    Pcp,  Does Not Have A Primary Care Provider [phone]









Reason for Visit







                          Reason                    Comments

 

                          Talk To Nurse             







Encounter Details







             Date         Type         Department   Care Team    Description

 

                2020      Telephone       [facility] ELISE- Kalli Sidhu R, FNP



                                        Talk To Nurse



                                                            [place]



                                        [address] [facility]



                                        



                                                            [address] [facility] [address]

[place] [address]



                                        



                                                            [place] [address]

955 [phone] (Fax) 







Allergies







             Active Allergy Reactions    Severity     Noted Date   Comments

 

             Metoclopramide Hcl Anxiety                   10/14/2018   



documented as of this encounter (statuses as of 2020)



Medications







          Medication Sig       Dispensed Refills   Start Date End Date  Status

 

          proMETHazine 25 mg Take 1 tablet 30 tablet 0         2018       

    Active



          tabletIndications: by mouth every                                     

    



          Nausea and vomiting 6 (six) hours                                     

    



          during pregnancy prior as needed for                                  

       



          to 22 weeks gestation Nausea and                                      

   



                    Vomiting (N/V).                                         

 

          ondansetron 4 mg Take 1 tablet 20 tablet 0         2019         

  Active



          disintegrating by mouth every                                         



          tabletIndications: 8 (eight) hours                                    

     



          Colitis   as needed for                                         



                    Nausea and                                         



                    Vomiting (N/V).                                         

 

          prenatal vit Take 1 Packet 30 Each   6         2020           Ac

tive



          33-iron-folic-dha by mouth daily.                                     

    



          (SELECT-OB + DHA) 29                                                  

 



          mg iron-1 mg -250 mg                                                  

 



          combo packIndications:                                                

   



          Supervision of high                                                   



          risk pregnancy,                                                   



          antepartum                                                   

 

          Nitrofurantoin&Nit. Take 1 capsule 20 capsule 0         2020 Active



          Macrocryst (MACROBID) by mouth 2                                      

   



          100 mg    (two) times                                         



          capsuleIndications: daily for 10                                      

   



          Urinary tract days.                                             



          infection without                                                   



          hematuria, site                                                   



          unspecified                                                   

 

          proMETHazine 25 mg Take 1 tablet 30 tablet 1         2020       

    Active



          tabletIndications: by mouth every                                     

    



          Nausea and vomiting 4 (four) hours                                    

     



          during pregnancy prior as needed for                                  

       



          to 22 weeks gestation Nausea and                                      

   



                    Vomiting (N/V).                                         



documented as of this encounter (statuses as of 2020)



Active Problems







                          Problem                   Noted Date

 

                          History of herpes simplex type 2 infection 2020

 

                          Tobacco abuse             2020

 

                          BMI 31.0-31.9,adult       2020

 

                          Glucose tolerance test abnormal 2019

 

                          12 weeks gestation of pregnancy 10/14/2018

 

                          Rubella non-immune status, antepartum 10/10/2018









                                        Overview: 







                                        Address in Postpartum









                          Dyspepsia                 10/03/2018

 

                          Class 1 obesity with body mass index (BMI) of 31.0 to 

31.9 in adult, 2018



                          unspecified obesity type, unspecified whether serious 

comorbidity present 

 

                          UTI (urinary tract infection) during pregnancy 

018









                                        Overview: 







                                        TONIE at next visit -neg









                          Supervision of high risk pregnancy in third trimester 

2018

 

                          Multiparity               2018

 

                          Marijuana use             2018









                                        Overview: 







                                        Reports due to nausea









                          Ovarian cyst              2018









                                        Overview: 







                                        Per outside usg see external provided re

cords









                          Atypical squamous cells of undetermined significance (

ASCUS) on 2018



                          Papanicolaou smear of cervix 









                                        Overview: 







                                        Repeat pap in 12 months









                          History of depression     2018

 

                          History of anxiety        2016

 

                          Nausea and vomiting during pregnancy prior to 22 weeks

 gestation 2016

 

                          Supervision of high risk pregnancy, antepartum 

016









                    Pregnant            Estimated Date of Delivery Comments

 

                    Yes                 2021          Based on last menstr

ual period of 2020



                                                            (Approximate)



documented as of this encounter (statuses as of 2020)



Resolved Problems







                    Problem             Noted Date          Resolved Date

 

                    Hyperemesis         10/06/2018          10/09/2018

 

                    Dehydration         2018          10/07/2018

 

                    11 weeks gestation of pregnancy 2018          10/09/20

18

 

                    Well woman exam     2018

 

                    Contraceptive management 2018

 

                    Nexplanon removal   2018









                                        Overview: 







                                        Per patient reports was only for 2 years









                    History of depression 2016

 

                    UTI in pregnancy, antepartum 2016









                                        Overview: 







                                        Still current uti as of 16









                    BV (bacterial vaginosis) 2016



documented as of this encounter (statuses as of 2020)



Immunizations







                    Name                Administration Dates Next Due

 

                    Influenza Virus Vaccine Quad .5 mL IM 6+ MO 2019, 10/1

2018 

 

                    Influenza Virus Vaccine Quad IM 3+ YRS 2016          

 

                    TDAP                2019          

 

                    TDAP (ADACEL) VACCINE 2016          



documented as of this encounter



Social History







             Tobacco Use  Types        Packs/Day    Years Used   Date

 

             Former Smoker                                        









                Smokeless Tobacco: Never Used                                 









                                        Comments: quit 2020 was smoking 1 

pack per day









                Alcohol Use     Drinks/Week     oz/Week         Comments

 

                No              0 Standard drinks or equivalent 0.0             









                    Pregnant            Estimated Date of Delivery Comments

 

                    Yes                 2021          Based on last menstr

ual period of 2020



                                                            (Approximate)









                          Sex Assigned at Birth     Date Recorded

 

                          Not on file               









                    Job Start Date      Occupation          Industry

 

                    Not on file         Not on file         Not on file









                    Travel History      Travel Start        Travel End









                                        No recent travel history available.









                    COVID-19 Exposure   Response            Date Recorded

 

                    In the last month, have you been in contact with No / Unsure

         2020  9:52 AM 

CDT



                    someone who was confirmed or suspected to have              

       



                    Coronavirus / COVID-19?                     



documented as of this encounter



Last Filed Vital Signs

Not on filedocumented in this encounter



Plan of Treatment







             Date         Type         Specialty    Care Team    Description

 

                2020      Routine Prenatal Visit [facility]   IKE Sidhu, FNP



                                        



                                                                [address] [address]

15



                                        



                                                                [phone] (Fax) 









                Health Maintenance Due Date        Last Done       Comments

 

                HPV VACCINES (1 - Female 2006                      



                2-dose series)                                  

 

                INFLUENZA VACCINE (#1) 2020,     



                                                10/18/2018,     



                                                2016      

 

                Depression Screening 2021,     



                                                2020      

 

                PAP SMEAR       2023,     



                                                2018      

 

                DTaP,Tdap,and Td Vaccines (3 2029,     



                - Td)                           2016      

 

                PNEUMOCOCCAL 0-64 YEARS Aged Out                        No longe

r eligible based



                COMBINED SERIES                                 on patient's age

 to



                                                                complete this to

pic



documented as of this encounter



Results

Not on filedocumented in this encounter



Visit Diagnoses







                                        Diagnosis

 

                                        Nausea and vomiting during pregnancy cintia

or to 22 weeks gestation - Primary



documented in this encounter



Insurance







          Payer     Benefit Plan / Subscriber ID Effective Dates Phone     Addre

ss   Type



                    Group                                             

 

          [employer] [place] 2020-Present [phone] [address] 

  Medicaid



                      [place]                                       [id number] [place] [address] 



                                                            [id number] 



documented as of this encounter



Advance Directives







                Name            Relationship    Healthcare Agent Communication



                                                Relationship    

 

                Andrew Arroyona Spouse          Primary healthcare agent [phone]



                                                                (Mobile)[phone] (Home)

 

                Vicky Cartagena  Mother          [address] [address]





                                                agent           (Mobile)[phone] (Home)

## 2020-08-21 NOTE — XMS REPORT
Summary of Care

                             Created on:2020



Patient:Temi Cartagena

Sex:Female

:1995

External Reference #:[id number]





Demographics







                          Address                   [address]



                                                    [place] [address]

 

                          Mobile Phone              [phone]

 

                          Home Phone                [phone]

 

                          Email Address             [email or web address]

[email or web address]

 

                          Preferred Language        English

 

                          Marital Status            Single

 

                          Hinduism Affiliation     Unknown

 

                          Race                      White

 

                          Ethnic Group               or 









Author







                          Organization              [facility]

 

                          Address                   [address] [place] [id number]









Support







                Name            Relationship    Address         Phone

 

                Andrew George Unavailable     [address] [phone]



                                                [place] [address] 

 

                Vicky Cartagena  Unavailable     [address] [phone]



                                                [place] [address] 









Care Team Providers







                    Name                Role                Phone

 

                    Pcp,  Does Not Have A Primary Care Provider [phone]









Reason for Visit







                          Reason                    Comments

 

                          URINARY TRACT INFECTION   







Encounter Details







             Date         Type         Department   Care Team    Description

 

             2020   Telephone    [facility]- Shravan Sidhu, UR

INARY TRACT



                                                            [place]



                                        FN



                                        INFECTION



                                                [address] [facility] [address] [place] [address]



                                        



                                                [place]    [phone] [address] [phone] (Fax)        



                                       [phone]              







Allergies







             Active Allergy Reactions    Severity     Noted Date   Comments

 

             Metoclopramide Hcl Anxiety                   10/14/2018   



documented as of this encounter (statuses as of 2020)



Medications







          Medication Sig       Dispensed Refills   Start Date End Date  Status

 

          proMETHazine 25 mg Take 1 tablet 30 tablet 0         2018       

    Active



          tabletIndications: by mouth every                                     

    



          Nausea and vomiting 6 (six) hours                                     

    



          during pregnancy prior as needed for                                  

       



          to 22 weeks gestation Nausea and                                      

   



                    Vomiting (N/V).                                         

 

          ondansetron 4 mg Take 1 tablet 20 tablet 0         2019         

  Active



          disintegrating by mouth every                                         



          tabletIndications: 8 (eight) hours                                    

     



          Colitis   as needed for                                         



                    Nausea and                                         



                    Vomiting (N/V).                                         

 

          prenatal vit Take 1 Packet 30 Each   6         2020           Ac

tive



          33-iron-folic-dha by mouth daily.                                     

    



          (SELECT-OB + DHA) 29                                                  

 



          mg iron-1 mg -250 mg                                                  

 



          combo packIndications:                                                

   



          Supervision of high                                                   



          risk pregnancy,                                                   



          antepartum                                                   

 

          Nitrofurantoin&Nit. Take 1 capsule 20 capsule 0         2020 Active



          Macrocryst (MACROBID) by mouth 2                                      

   



          100 mg    (two) times                                         



          capsuleIndications: daily for 10                                      

   



          Urinary tract days.                                             



          infection without                                                   



          hematuria, site                                                   



          unspecified                                                   



documented as of this encounter (statuses as of 2020)



Active Problems







                          Problem                   Noted Date

 

                          History of herpes simplex type 2 infection 2020

 

                          Tobacco abuse             2020

 

                          BMI 31.0-31.9,adult       2020

 

                          Glucose tolerance test abnormal 2019

 

                          12 weeks gestation of pregnancy 10/14/2018

 

                          Rubella non-immune status, antepartum 10/10/2018









                                        Overview: 







                                        Address in Postpartum









                          Dyspepsia                 10/03/2018

 

                          Class 1 obesity with body mass index (BMI) of 31.0 to 

31.9 in adult, 2018



                          unspecified obesity type, unspecified whether serious 

comorbidity present 

 

                          UTI (urinary tract infection) during pregnancy 

018









                                        Overview: 







                                        TONIE at next visit -neg









                          Supervision of high risk pregnancy in third trimester 

2018

 

                          Multiparity               2018

 

                          Marijuana use             2018









                                        Overview: 







                                        Reports due to nausea









                          Ovarian cyst              2018









                                        Overview: 







                                        Per outside usg see external provided re

cords









                          Atypical squamous cells of undetermined significance (

ASCUS) on 2018



                          Papanicolaou smear of cervix 









                                        Overview: 







                                        Repeat pap in 12 months









                          History of depression     2018

 

                          History of anxiety        2016

 

                          Nausea and vomiting during pregnancy prior to 22 weeks

 gestation 2016

 

                          Supervision of high risk pregnancy, antepartum 

016









                    Pregnant            Estimated Date of Delivery Comments

 

                    Yes                 2021          Based on last menstr

ual period of 2020



                                                            (Approximate)



documented as of this encounter (statuses as of 2020)



Resolved Problems







                    Problem             Noted Date          Resolved Date

 

                    Hyperemesis         10/06/2018          10/09/2018

 

                    Dehydration         2018          10/07/2018

 

                    11 weeks gestation of pregnancy 2018          10/09/20

18

 

                    Well woman exam     2018

 

                    Contraceptive management 2018

 

                    Nexplanon removal   2018









                                        Overview: 







                                        Per patient reports was only for 2 years









                    History of depression 2016

 

                    UTI in pregnancy, antepartum 2016









                                        Overview: 







                                        Still current uti as of 16









                    BV (bacterial vaginosis) 2016



documented as of this encounter (statuses as of 2020)



Immunizations







                    Name                Administration Dates Next Due

 

                    Influenza Virus Vaccine Quad .5 mL IM 6+ MO 2019, 10/1

2018 

 

                    Influenza Virus Vaccine Quad IM 3+ YRS 2016          

 

                    TDAP                2019          

 

                    TDAP (ADACEL) VACCINE 2016          



documented as of this encounter



Social History







             Tobacco Use  Types        Packs/Day    Years Used   Date

 

             Former Smoker                                        









                Smokeless Tobacco: Never Used                                 









                                        Comments: quit 2020 was smoking 1 

pack per day









                Alcohol Use     Drinks/Week     oz/Week         Comments

 

                No              0 Standard drinks or equivalent 0.0             









                    Pregnant            Estimated Date of Delivery Comments

 

                    Yes                 2021          Based on last menstr

ual period of 2020



                                                            (Approximate)









                          Sex Assigned at Birth     Date Recorded

 

                          Not on file               









                    Job Start Date      Occupation          Industry

 

                    Not on file         Not on file         Not on file









                    Travel History      Travel Start        Travel End









                                        No recent travel history available.









                    COVID-19 Exposure   Response            Date Recorded

 

                    In the last month, have you been in contact with No / Unsure

         2020  9:52 AM 

CDT



                    someone who was confirmed or suspected to have              

       



                    Coronavirus / COVID-19?                     



documented as of this encounter



Last Filed Vital Signs

Not on filedocumented in this encounter



Plan of Treatment







             Date         Type         Specialty    Care Team    Description

 

             2020   Technician Visit Maternal Fetal              



                                       Medicine                  

 

             2020   Routine Prenatal Visit [facility] Shravan Sidhu, 



                                                                FNP



                                        



                                                    [address] [address]

15



                                        



                                                                [phone] (Fax) 









                Health Maintenance Due Date        Last Done       Comments

 

                HPV VACCINES (1 - Female 2006                      



                2-dose series)                                  

 

                INFLUENZA VACCINE (#1) 2020,     



                                                10/18/2018,     



                                                2016      

 

                Depression Screening 2021,     



                                                2020      

 

                PAP SMEAR       2023,     



                                                2018      

 

                DTaP,Tdap,and Td Vaccines (3 2029,     



                - Td)                           2016      

 

                PNEUMOCOCCAL 0-64 YEARS Aged Out                        No longe

r eligible based



                COMBINED SERIES                                 on patient's age

 to



                                                                complete this to

pic



documented as of this encounter



Results

Not on filedocumented in this encounter



Visit Diagnoses







                                        Diagnosis

 

                                        Urinary tract infection without hematuri

a, site unspecified - Primary



documented in this encounter



Insurance







          Payer     Benefit Plan / Subscriber ID Effective Dates Phone     Addre

ss   Type



                    Group                                             

 

          [employer] [place] 2020-Present [phone] [address] 

  Medicaid



                      [place]                                       [id number] [place] [address] 



                                                            [id number] 



documented as of this encounter



Advance Directives







                Name            Relationship    Healthcare Agent Communication



                                                Relationship    

 

                Andrew George Spouse          Primary healthcare agent [phone]



                                                                [phone])[phone] (Home)

 

                Vicky Cartagena  Mother          [facility] [phone]





                                                agent           (Mobile)[phone] (Home)

## 2020-08-21 NOTE — XMS REPORT
Summary of Care

                             Created on:2020



Patient:Temi Cartagena

Sex:Female

:1995

External Reference #:[id number]





Demographics







                          Address                   [address]



                                                    [place] [address]

 

                          Mobile Phone              [phone]

 

                          Home Phone                [phone]

 

                          Email Address             [email or web address]

[email or web address]

 

                          Preferred Language        English

 

                          Marital Status            Single

 

                          Protestant Affiliation     Unknown

 

                          Race                      White

 

                          Ethnic Group               or 









Author







                          Organization              [facility]

 

                          Address                   [address] [place] [id number]









Support







                Name            Relationship    Address         Phone

 

                Andrew George Unavailable     [address] [phone]



                                                [place] [address] 

 

                Vicky Cartagena  Unavailable     [address] [phone]



                                                [place] [address] 









Care Team Providers







                    Name                Role                Phone

 

                    Pcp,  Does Not Have A Primary Care Provider [phone]









Reason for Visit







                          Reason                    Comments

 

                          Abnormal Lab              1hr ABN







Encounter Details







             Date         Type         Department   Care Team    Description

 

             2020   Telephone    [facility]- Shravan Sidhu Ab

normal Lab (1hr 

ABN)



                                                            [address] [address]



                                        



                                                [place]    [phone] [address] [phone] (Fax)        



                                       [phone]              







Allergies







             Active Allergy Reactions    Severity     Noted Date   Comments

 

             Metoclopramide Hcl Anxiety                   10/14/2018   



documented as of this encounter (statuses as of 2020)



Medications







          Medication Sig       Dispensed Refills   Start Date End Date  Status

 

          proMETHazine 25 mg Take 1 tablet by 30 tablet 0         2018    

       Active



          tabletIndications: mouth every 6                                      

   



          Nausea and vomiting (six) hours as                                    

     



          during pregnancy prior needed for Nausea                              

           



          to 22 weeks gestation and Vomiting                                    

     



                    (N/V).                                            

 

          ondansetron 4 mg Take 1 tablet by 20 tablet 0         2019      

     Active



          disintegrating mouth every 8                                         



          tabletIndications: (eight) hours as                                   

      



          Colitis   needed for Nausea                                         



                    and Vomiting                                         



                    (N/V).                                            

 

          prenatal vit Take 1 Packet by 30 Each   6         2020          

 Active



          33-iron-folic-dha mouth daily.                                        

 



          (SELECT-OB + DHA) 29 mg                                               

    



          iron-1 mg -250 mg combo                                               

    



          packIndications:                                                   



          Supervision of high                                                   



          risk pregnancy,                                                   



          antepartum                                                   



documented as of this encounter (statuses as of 2020)



Active Problems







                          Problem                   Noted Date

 

                          History of herpes simplex type 2 infection 2020

 

                          Tobacco abuse             2020

 

                          BMI 31.0-31.9,adult       2020

 

                          Glucose tolerance test abnormal 2019

 

                          12 weeks gestation of pregnancy 10/14/2018

 

                          Rubella non-immune status, antepartum 10/10/2018

 

                          Dyspepsia                 10/03/2018

 

                          Class 1 obesity with body mass index (BMI) of 31.0 to 

31.9 in adult, 2018



                          unspecified obesity type, unspecified whether serious 

comorbidity present 

 

                          UTI (urinary tract infection) during pregnancy 

018









                                        Overview: 







                                        TONIE at next visit -neg









                          Supervision of high risk pregnancy in third trimester 

2018

 

                          Multiparity               2018

 

                          Marijuana use             2018









                                        Overview: 







                                        Reports due to nausea









                          Ovarian cyst              2018









                                        Overview: 







                                        Per outside usg see external provided re

cords









                          Atypical squamous cells of undetermined significance (

ASCUS) on 2018



                          Papanicolaou smear of cervix 









                                        Overview: 







                                        Repeat pap in 12 months









                          History of depression     2018

 

                          History of anxiety        2016

 

                          Nausea and vomiting during pregnancy prior to 22 weeks

 gestation 2016

 

                          Supervision of high risk pregnancy, antepartum 

016









                    Pregnant            Estimated Date of Delivery Comments

 

                    Yes                 2021          Based on last menstr

ual period of 2020



                                                            (Approximate)



documented as of this encounter (statuses as of 2020)



Resolved Problems







                    Problem             Noted Date          Resolved Date

 

                    Hyperemesis         10/06/2018          10/09/2018

 

                    Dehydration         2018          10/07/2018

 

                    11 weeks gestation of pregnancy 2018          10/09/20

18

 

                    Well woman exam     2018

 

                    Contraceptive management 2018

 

                    Nexplanon removal   2018









                                        Overview: 







                                        Per patient reports was only for 2 years









                    History of depression 2016

 

                    UTI in pregnancy, antepartum 2016









                                        Overview: 







                                        Still current uti as of 5--16









                    BV (bacterial vaginosis) 2016



documented as of this encounter (statuses as of 2020)



Immunizations







                    Name                Administration Dates Next Due

 

                    Influenza Virus Vaccine Quad .5 mL IM 6+ MO 2019, 10/1

2018 

 

                    Influenza Virus Vaccine Quad IM 3+ YRS 2016          

 

                    TDAP                2019          

 

                    TDAP (ADACEL) VACCINE 2016          



documented as of this encounter



Social History







             Tobacco Use  Types        Packs/Day    Years Used   Date

 

             Former Smoker                                        









                Smokeless Tobacco: Never Used                                 









                                        Comments: quit 2020 was smoking 1 

pack per day









                Alcohol Use     Drinks/Week     oz/Week         Comments

 

                No              0 Standard drinks or equivalent 0.0             









                    Pregnant            Estimated Date of Delivery Comments

 

                    Yes                 2021          Based on last menstr

ual period of 2020



                                                            (Approximate)









                          Sex Assigned at Birth     Date Recorded

 

                          Not on file               









                    Job Start Date      Occupation          Industry

 

                    Not on file         Not on file         Not on file









                    Travel History      Travel Start        Travel End









                                        No recent travel history available.









                    COVID-19 Exposure   Response            Date Recorded

 

                    In the last month, have you been in contact with No / Unsure

         2020  9:52 AM 

CDT



                    someone who was confirmed or suspected to have              

       



                    Coronavirus / COVID-19?                     



documented as of this encounter



Last Filed Vital Signs

Not on filedocumented in this encounter



Plan of Treatment







             Date         Type         Specialty    Care Team    Description

 

             2020   Technician Visit Maternal Fetal              



                                       Medicine                  

 

             2020   Routine Prenatal Visit [facility] Shravan Sidhu, 



                                                                FNP



                                        



                                                    [address] [place], [place] [address]

[address]



                                        



                                                                [phone] (Fax) 









             Name         Type         Priority     Associated Diagnoses Order S

chedule

 

             3 HR GLUCOSE TOLERANCE LAB          Routine      Abnormal maternal 

glucose Expected: 

2020,



             PANEL                                  tolerance, antepartum 

s: 2021









                Health Maintenance Due Date        Last Done       Comments

 

                HPV VACCINES (1 - Female 2006                      



                2-dose series)                                  

 

                INFLUENZA VACCINE (#1) 2020,     



                                                10/18/2018,     



                                                2016      

 

                PAP SMEAR       2021      

 

                Depression Screening 2021,     



                                                2020      

 

                DTaP,Tdap,and Td Vaccines (3 2029,     



                - Td)                           2016      

 

                PNEUMOCOCCAL 0-64 YEARS Aged Out                        No longe

r eligible based



                COMBINED SERIES                                 on patient's age

 to



                                                                complete this to

pic



documented as of this encounter



Results

Not on filedocumented in this encounter



Visit Diagnoses







                                        Diagnosis

 

                                        Abnormal maternal glucose tolerance, ant

epartum - Primary



documented in this encounter



Insurance







          Payer     Benefit Plan / Subscriber ID Effective Dates Phone     Addre

ss   Type



                    Group                                             

 

          [employer] [place] 2020-Present [phone] [address] 

  Medicaid



                      [place]                                       [id number] [place] [address] 



                                                            [id number] 



documented as of this encounter



Advance Directives







                Name            Relationship    Healthcare Agent Communication



                                                Relationship    

 

                Andrew George Spouse          Primary healthcare agent [phone]



                                                                [phone])[phone] (Home)

 

                Vicky Cartagena  Mother          [place] [id number]





                                                agent           (Mobile)[phone] (Home)

## 2020-08-21 NOTE — XMS REPORT
Summary of Care

                             Created on:2020



Patient:Temi Cartagena

Sex:Female

:1995

External Reference #:[id number]





Demographics







                          Address                   [address]



                                                    [place] [address]

 

                          Mobile Phone              [phone]

 

                          Home Phone                [phone]

 

                          Email Address             [email or web address]

[email or web address]

 

                          Preferred Language        English

 

                          Marital Status            Single

 

                          Taoist Affiliation     Unknown

 

                          Race                      White

 

                          Ethnic Group               or 









Author







                          Organization              [facility]

 

                          Address                   [address] [place] [address]









Support







                Name            Relationship    Address         Phone

 

                Andrew George Unavailable     [address] [phone]



                                                [place] [address] 

 

                Vicky Cartagena  Unavailable     [address] [phone]



                                                [place] [address] 









Care Team Providers







                    Name                Role                Phone

 

                    Pcp,  Does Not Have A Primary Care Provider [phone]









Reason for Visit







                          Reason                    Comments

 

                          Results                   







Encounter Details







             Date         Type         Department   Care Team    Description

 

                    2020          Telephone           [facility]

Shravan Morgan, RAYP



                                        Results



                                                            [address] [facility] [address]

treet



                                        [address] [place] [address]

[address]



                                        [place] [address]



                                        



                                                [phone] (Fax) 







Allergies







             Active Allergy Reactions    Severity     Noted Date   Comments

 

             Metoclopramide Hcl Anxiety                   10/14/2018   



documented as of this encounter (statuses as of 2020)



Medications







          Medication Sig       Dispensed Refills   Start Date End Date  Status

 

          proMETHazine 25 mg Take 1 tablet 30 tablet 0         2018       

    Active



          tabletIndications: by mouth every                                     

    



          Nausea and vomiting 6 (six) hours                                     

    



          during pregnancy prior as needed for                                  

       



          to 22 weeks gestation Nausea and                                      

   



                    Vomiting (N/V).                                         

 

          ondansetron 4 mg Take 1 tablet 20 tablet 0         2019         

  Active



          disintegrating by mouth every                                         



          tabletIndications: 8 (eight) hours                                    

     



          Colitis   as needed for                                         



                    Nausea and                                         



                    Vomiting (N/V).                                         

 

          prenatal vit Take 1 Packet 30 Each   6         2020           Ac

tive



          33-iron-folic-dha by mouth daily.                                     

    



          (SELECT-OB + DHA) 29                                                  

 



          mg iron-1 mg -250 mg                                                  

 



          combo packIndications:                                                

   



          Supervision of high                                                   



          risk pregnancy,                                                   



          antepartum                                                   

 

          Nitrofurantoin&Nit. Take 1 capsule 20 capsule 0         2020 Active



          Macrocryst (MACROBID) by mouth 2                                      

   



          100 mg    (two) times                                         



          capsuleIndications: daily for 10                                      

   



          Urinary tract days.                                             



          infection without                                                   



          hematuria, site                                                   



          unspecified                                                   



documented as of this encounter (statuses as of 2020)



Active Problems







                          Problem                   Noted Date

 

                          History of herpes simplex type 2 infection 2020

 

                          Tobacco abuse             2020

 

                          BMI 31.0-31.9,adult       2020

 

                          Glucose tolerance test abnormal 2019

 

                          12 weeks gestation of pregnancy 10/14/2018

 

                          Rubella non-immune status, antepartum 10/10/2018









                                        Overview: 







                                        Address in Postpartum









                          Dyspepsia                 10/03/2018

 

                          Class 1 obesity with body mass index (BMI) of 31.0 to 

31.9 in adult, 2018



                          unspecified obesity type, unspecified whether serious 

comorbidity present 

 

                          UTI (urinary tract infection) during pregnancy 

018









                                        Overview: 







                                        TONIE at next visit -neg









                          Supervision of high risk pregnancy in third trimester 

2018

 

                          Multiparity               2018

 

                          Marijuana use             2018









                                        Overview: 







                                        Reports due to nausea









                          Ovarian cyst              2018









                                        Overview: 







                                        Per outside usg see external provided re

cords









                          Atypical squamous cells of undetermined significance (

ASCUS) on 2018



                          Papanicolaou smear of cervix 









                                        Overview: 







                                        Repeat pap in 12 months









                          History of depression     2018

 

                          History of anxiety        2016

 

                          Nausea and vomiting during pregnancy prior to 22 weeks

 gestation 2016

 

                          Supervision of high risk pregnancy, antepartum 

016









                    Pregnant            Estimated Date of Delivery Comments

 

                    Yes                 2021          Based on last menstr

ual period of 2020



                                                            (Approximate)



documented as of this encounter (statuses as of 2020)



Resolved Problems







                    Problem             Noted Date          Resolved Date

 

                    Hyperemesis         10/06/2018          10/09/2018

 

                    Dehydration         2018          10/07/2018

 

                    11 weeks gestation of pregnancy 2018          10/09/20

18

 

                    Well woman exam     2018

 

                    Contraceptive management 2018

 

                    Nexplanon removal   2018









                                        Overview: 







                                        Per patient reports was only for 2 years









                    History of depression 2016

 

                    UTI in pregnancy, antepartum 2016









                                        Overview: 







                                        Still current uti as of 16









                    BV (bacterial vaginosis) 2016



documented as of this encounter (statuses as of 2020)



Immunizations







                    Name                Administration Dates Next Due

 

                    Influenza Virus Vaccine Quad .5 mL IM 6+ MO 2019, 10/1

2018 

 

                    Influenza Virus Vaccine Quad IM 3+ YRS 2016          

 

                    TDAP                2019          

 

                    TDAP (ADACEL) VACCINE 2016          



documented as of this encounter



Social History







             Tobacco Use  Types        Packs/Day    Years Used   Date

 

             Former Smoker                                        









                Smokeless Tobacco: Never Used                                 









                                        Comments: quit 2020 was smoking 1 

pack per day









                Alcohol Use     Drinks/Week     oz/Week         Comments

 

                No              0 Standard drinks or equivalent 0.0             









                    Pregnant            Estimated Date of Delivery Comments

 

                    Yes                 2021          Based on last menstr

ual period of 2020



                                                            (Approximate)









                          Sex Assigned at Birth     Date Recorded

 

                          Not on file               









                    Job Start Date      Occupation          Industry

 

                    Not on file         Not on file         Not on file









                    Travel History      Travel Start        Travel End









                                        No recent travel history available.









                    COVID-19 Exposure   Response            Date Recorded

 

                    In the last month, have you been in contact with No / Unsure

         2020  9:52 AM 

CDT



                    someone who was confirmed or suspected to have              

       



                    Coronavirus / COVID-19?                     



documented as of this encounter



Last Filed Vital Signs

Not on filedocumented in this encounter



Plan of Treatment







             Date         Type         Specialty    Care Team    Description

 

             2020   Technician Visit Maternal Fetal              



                                       Medicine                  

 

             2020   Routine Prenatal Visit [facility] Shravan Sidhu, 



                                                                FNP



                                        



                                                    [address], 

[address]



                                        



                                                                [phone] (Fax) 









                Health Maintenance Due Date        Last Done       Comments

 

                HPV VACCINES (1 - Female 2006                      



                2-dose series)                                  

 

                INFLUENZA VACCINE (#1) 2020,     



                                                10/18/2018,     



                                                2016      

 

                Depression Screening 2021,     



                                                2020      

 

                PAP SMEAR       2023,     



                                                2018      

 

                DTaP,Tdap,and Td Vaccines (3 2029,     



                - Td)                           2016      

 

                PNEUMOCOCCAL 0-64 YEARS Aged Out                        No longe

r eligible based



                COMBINED SERIES                                 on patient's age

 to



                                                                complete this to

pic



documented as of this encounter



Results

Not on filedocumented in this encounter



Insurance







          Payer     Benefit Plan / Subscriber ID Effective Dates Phone     Addre

ss   Type



                    Group                                             

 

          [facility]      MEDICAID OF xxxxxxxxx 2020-Present [id number] [address] 

  Medicaid



                      [place]                                       [id number] [place] [address] 



                                                            [id number] 



documented as of this encounter



Advance Directives







                Name            Relationship    Healthcare Agent Communication



                                                Relationship    

 

                Andrew George Spouse          Primary healthcare agent [phone]



                                                                [phone])[phone] (Home)

 

                Vicky Cartagena  Mother          [place] [phone]





                                                agent           (Mobile)[phone] (Home)

## 2020-08-21 NOTE — XMS REPORT
Summary of Care

                             Created on:2020



Patient:Temi Cartagena

Sex:Female

:1995

External Reference #:[id number]





Demographics







                          Address                   [address]



                                                    [place] [address]

 

                          Mobile Phone              [phone]

 

                          Home Phone                [phone]

 

                          Email Address             [email or web address]



 

                          Preferred Language        English

 

                          Marital Status            Single

 

                          Mormon Affiliation     Unknown

 

                          Race                      White

 

                          Ethnic Group               or 









Author







                          Organization              [facility]

 

                          Address                   [address] [place] [id number]









Support







                Name            Relationship    Address         Phone

 

                Andrew George Unavailable     [address] [phone]



                                                [place] [address] 

 

                Vicky Cartagena  Unavailable     [address] [phone]



                                                [place] [address] 









Care Team Providers







                    Name                Role                Phone

 

                    Pcp,  Does Not Have A Primary Care Provider [phone]









Reason for Visit







                          Reason                    Comments

 

                          PREGNANCY ULTRASOUND      



 (Routine)





           Status     Reason     Specialty  Diagnoses / Referred By Referred To



                                            Procedures Contact    Contact

 

                Closed                          Maternal Fetal  Diagnoses



Supervision of high risk pregnancy, antepartum Shravan Sidhu          



                                                Medicine        



Procedures



CONSULT MATERNAL FETAL MEDICINE ULTRASOUND Preferred Location: [facility]



                                        



                                                       [address], [place] 



                                                                 [address]



                                        



                                                       Phone:     



                                                                 [phone]



                                        



                                                       Fax: [phone] 









Encounter Details







             Date         Type         Department   Care Team    Description

 

             2020   [facility] rine size-date



                                                            Ultrasound- Cata EDDY



                                        discrepancy in first



                                                            [address] [facility]



                          [address]               trimester



                                                [place], [place]    [id number]



                                        



                                                            [address]



                          [place]             



                                                [phone]    [id number] [phone] 



                                                    (Fax)        







Allergies







             Active Allergy Reactions    Severity     Noted Date   Comments

 

             Metoclopramide Hcl Anxiety                   10/14/2018   



documented as of this encounter (statuses as of 2020)



Medications







          Medication Sig       Dispensed Refills   Start Date End Date  Status

 

          proMETHazine 25 mg Take 1 tablet 30 tablet 0         2018       

    Active



          tabletIndications: by mouth every                                     

    



          Nausea and vomiting 6 (six) hours                                     

    



          during pregnancy prior as needed for                                  

       



          to 22 weeks gestation Nausea and                                      

   



                    Vomiting (N/V).                                         

 

          ondansetron 4 mg Take 1 tablet 20 tablet 0         2019         

  Active



          disintegrating by mouth every                                         



          tabletIndications: 8 (eight) hours                                    

     



          Colitis   as needed for                                         



                    Nausea and                                         



                    Vomiting (N/V).                                         

 

          prenatal vit Take 1 Packet 30 Each   6         2020           Ac

tive



          33-iron-folic-dha by mouth daily.                                     

    



          (SELECT-OB + DHA) 29                                                  

 



          mg iron-1 mg -250 mg                                                  

 



          combo packIndications:                                                

   



          Supervision of high                                                   



          risk pregnancy,                                                   



          antepartum                                                   

 

          Nitrofurantoin&Nit. Take 1 capsule 20 capsule 0         2020 Active



          Macrocryst (MACROBID) by mouth 2                                      

   



          100 mg    (two) times                                         



          capsuleIndications: daily for 10                                      

   



          Urinary tract days.                                             



          infection without                                                   



          hematuria, site                                                   



          unspecified                                                   



documented as of this encounter (statuses as of 2020)



Active Problems







                          Problem                   Noted Date

 

                          History of herpes simplex type 2 infection 2020

 

                          Tobacco abuse             2020

 

                          BMI 31.0-31.9,adult       2020

 

                          Glucose tolerance test abnormal 2019

 

                          12 weeks gestation of pregnancy 10/14/2018

 

                          Rubella non-immune status, antepartum 10/10/2018









                                        Overview: 







                                        Address in Postpartum









                          Dyspepsia                 10/03/2018

 

                          Class 1 obesity with body mass index (BMI) of 31.0 to 

31.9 in adult, 2018



                          unspecified obesity type, unspecified whether serious 

comorbidity present 

 

                          UTI (urinary tract infection) during pregnancy 

018









                                        Overview: 







                                        TONIE at next visit -neg









                          Supervision of high risk pregnancy in third trimester 

2018

 

                          Multiparity               2018

 

                          Marijuana use             2018









                                        Overview: 







                                        Reports due to nausea









                          Ovarian cyst              2018









                                        Overview: 







                                        Per outside usg see external provided re

cords









                          Atypical squamous cells of undetermined significance (

ASCUS) on 2018



                          Papanicolaou smear of cervix 









                                        Overview: 







                                        Repeat pap in 12 months









                          History of depression     2018

 

                          History of anxiety        2016

 

                          Nausea and vomiting during pregnancy prior to 22 weeks

 gestation 2016

 

                          Supervision of high risk pregnancy, antepartum 

016









                    Pregnant            Estimated Date of Delivery Comments

 

                    Yes                 2021          Based on last menstr

ual period of 2020



                                                            (Approximate)



documented as of this encounter (statuses as of 2020)



Resolved Problems







                    Problem             Noted Date          Resolved Date

 

                    Hyperemesis         10/06/2018          10/09/2018

 

                    Dehydration         2018          10/07/2018

 

                    11 weeks gestation of pregnancy 2018          10/09/20

18

 

                    Well woman exam     2018

 

                    Contraceptive management 2018

 

                    Nexplanon removal   2018









                                        Overview: 







                                        Per patient reports was only for 2 years









                    History of depression 2016

 

                    UTI in pregnancy, antepartum 2016









                                        Overview: 







                                        Still current uti as of 16









                    BV (bacterial vaginosis) 2016



documented as of this encounter (statuses as of 2020)



Immunizations







                    Name                Administration Dates Next Due

 

                    Influenza Virus Vaccine Quad .5 mL IM 6+ MO 2019, 10/1

2018 

 

                    Influenza Virus Vaccine Quad IM 3+ YRS 2016          

 

                    TDAP                2019          

 

                    TDAP (ADACEL) VACCINE 2016          



documented as of this encounter



Social History







             Tobacco Use  Types        Packs/Day    Years Used   Date

 

             Former Smoker                                        









                Smokeless Tobacco: Never Used                                 









                                        Comments: quit 2020 was smoking 1 

pack per day









                Alcohol Use     Drinks/Week     oz/Week         Comments

 

                No              0 Standard drinks or equivalent 0.0             









                    Pregnant            Estimated Date of Delivery Comments

 

                    Yes                 2021          Based on last menstr

ual period of 2020



                                                            (Approximate)









                          Sex Assigned at Birth     Date Recorded

 

                          Not on file               









                    Job Start Date      Occupation          Industry

 

                    Not on file         Not on file         Not on file









                    Travel History      Travel Start        Travel End









                                        No recent travel history available.









                    COVID-19 Exposure   Response            Date Recorded

 

                    In the last month, have you been in contact with No / Unsure

         2020  9:52 AM 

CDT



                    someone who was confirmed or suspected to have              

       



                    Coronavirus / COVID-19?                     



documented as of this encounter



Last Filed Vital Signs

Not on filedocumented in this encounter



Plan of Treatment







             Date         Type         Specialty    Care Team    Description

 

                2020      Routine Prenatal Visit [facility]   IKE Sidhu, FNP



                                        



                                                                [address] [address]

15



                                        



                                                                [phone] (Fax) 









                Health Maintenance Due Date        Last Done       Comments

 

                HPV VACCINES (1 - Female 2006                      



                2-dose series)                                  

 

                INFLUENZA VACCINE (#1) 2020,     



                                                10/18/2018,     



                                                2016      

 

                Depression Screening 2021,     



                                                2020      

 

                PAP SMEAR       2023,     



                                                2018      

 

                DTaP,Tdap,and Td Vaccines (3 2029,     



                - Td)                           2016      

 

                PNEUMOCOCCAL 0-64 YEARS Aged Out                        No longe

r eligible based



                COMBINED SERIES                                 on patient's age

 to



                                                                complete this to

pic



documented as of this encounter



Results

Not on filedocumented in this encounter



Visit Diagnoses







                                        Diagnosis

 

                                        Uterine size-date discrepancy in first t

rimester







                                        Uterine size date discrepancy, antepartu

m condition or complication



documented in this encounter



Insurance







          Payer     Benefit Plan / Subscriber ID Effective Dates Phone     Addre

ss   Type



                    Group                                             

 

          [facility]      MEDICAID OF xxxxxxxxx 2020-Present [id number] [address] 

  Medicaid



                      [place]                                       [id number] [place] [address] 



                                                            [id number] 









           Guarantor Name Account Type Relation to Date of    Phone      Billing

 Address



                                 Patient    Birth                 

 

           Temi Cartagena Personal/Famil Self       1995 [phone] [address]                                (Home)     [place]



                                                                  [address]



documented as of this encounter



Advance Directives







                Name            Relationship    Healthcare Agent Communication



                                                Relationship    

 

                Andrew George Spouse          Primary healthcare agent [phone]



                                                                (Mobile)[phone] (Home)

 

                Vicky Cartagena  Mother          [facility] [phone]





                                                agent           (Mobile)[phone] (Home)

## 2020-08-21 NOTE — XMS REPORT
Summary of Care

                             Created on:2020



Patient:Temi Cartagena

Sex:Female

:1995

External Reference #:[id number]





Demographics







                          Address                   [address]



                                                    [place] [address]

 

                          Mobile Phone              [phone]

 

                          Home Phone                [phone]

 

                          Email Address             [email or web address]

[email or web address]

 

                          Preferred Language        English

 

                          Marital Status            Single

 

                          Yarsanism Affiliation     Unknown

 

                          Race                      White

 

                          Ethnic Group               or 









Author







                          Organization              [facility]

 

                          Address                   [address] [place] [address]









Support







                Name            Relationship    Address         Phone

 

                Andrew George Unavailable     [address] [phone]



                                                [place] [address] 

 

                Vicky Cartagena  Unavailable     [address] [phone]



                                                [place] [address] 









Care Team Providers







                    Name                Role                Phone

 

                    Pcp,  Does Not Have A Primary Care Provider [phone]









Reason for Visit







                          Reason                    Comments

 

                          URINARY TRACT INFECTION   







Encounter Details







             Date         Type         Department   Care Team    Description

 

             2020   Telephone    [facility]- Shravan Sidhu, UR

INARY TRACT



                                                            [place]



                                        FN



                                        INFECTION



                                                [address] [facility] [address] [place] [address]



                                        



                                                [place]    [phone] [address] [phone] (Fax)        



                                       [phone]              







Allergies







             Active Allergy Reactions    Severity     Noted Date   Comments

 

             Metoclopramide Hcl Anxiety                   10/14/2018   



documented as of this encounter (statuses as of 2020)



Medications







          Medication Sig       Dispensed Refills   Start Date End Date  Status

 

          proMETHazine 25 mg Take 1 tablet 30 tablet 0         2018       

    Active



          tabletIndications: by mouth every                                     

    



          Nausea and vomiting 6 (six) hours                                     

    



          during pregnancy prior as needed for                                  

       



          to 22 weeks gestation Nausea and                                      

   



                    Vomiting (N/V).                                         

 

          ondansetron 4 mg Take 1 tablet 20 tablet 0         2019         

  Active



          disintegrating by mouth every                                         



          tabletIndications: 8 (eight) hours                                    

     



          Colitis   as needed for                                         



                    Nausea and                                         



                    Vomiting (N/V).                                         

 

          prenatal vit Take 1 Packet 30 Each   6         2020           Ac

tive



          33-iron-folic-dha by mouth daily.                                     

    



          (SELECT-OB + DHA) 29                                                  

 



          mg iron-1 mg -250 mg                                                  

 



          combo packIndications:                                                

   



          Supervision of high                                                   



          risk pregnancy,                                                   



          antepartum                                                   

 

          Nitrofurantoin&Nit. Take 1 capsule 20 capsule 0         2020 Active



          Macrocryst (MACROBID) by mouth 2                                      

   



          100 mg    (two) times                                         



          capsuleIndications: daily for 10                                      

   



          Urinary tract days.                                             



          infection without                                                   



          hematuria, site                                                   



          unspecified                                                   



documented as of this encounter (statuses as of 2020)



Active Problems







                          Problem                   Noted Date

 

                          History of herpes simplex type 2 infection 2020

 

                          Tobacco abuse             2020

 

                          BMI 31.0-31.9,adult       2020

 

                          Glucose tolerance test abnormal 2019

 

                          12 weeks gestation of pregnancy 10/14/2018

 

                          Rubella non-immune status, antepartum 10/10/2018









                                        Overview: 







                                        Address in Postpartum









                          Dyspepsia                 10/03/2018

 

                          Class 1 obesity with body mass index (BMI) of 31.0 to 

31.9 in adult, 2018



                          unspecified obesity type, unspecified whether serious 

comorbidity present 

 

                          UTI (urinary tract infection) during pregnancy 

018









                                        Overview: 







                                        TONIE at next visit -neg









                          Supervision of high risk pregnancy in third trimester 

2018

 

                          Multiparity               2018

 

                          Marijuana use             2018









                                        Overview: 







                                        Reports due to nausea









                          Ovarian cyst              2018









                                        Overview: 







                                        Per outside usg see external provided re

cords









                          Atypical squamous cells of undetermined significance (

ASCUS) on 2018



                          Papanicolaou smear of cervix 









                                        Overview: 







                                        Repeat pap in 12 months









                          History of depression     2018

 

                          History of anxiety        2016

 

                          Nausea and vomiting during pregnancy prior to 22 weeks

 gestation 2016

 

                          Supervision of high risk pregnancy, antepartum 

016









                    Pregnant            Estimated Date of Delivery Comments

 

                    Yes                 2021          Based on last menstr

ual period of 2020



                                                            (Approximate)



documented as of this encounter (statuses as of 2020)



Resolved Problems







                    Problem             Noted Date          Resolved Date

 

                    Hyperemesis         10/06/2018          10/09/2018

 

                    Dehydration         2018          10/07/2018

 

                    11 weeks gestation of pregnancy 2018          10/09/20

18

 

                    Well woman exam     2018

 

                    Contraceptive management 2018

 

                    Nexplanon removal   2018









                                        Overview: 







                                        Per patient reports was only for 2 years









                    History of depression 2016

 

                    UTI in pregnancy, antepartum 2016









                                        Overview: 







                                        Still current uti as of 16









                    BV (bacterial vaginosis) 2016



documented as of this encounter (statuses as of 2020)



Immunizations







                    Name                Administration Dates Next Due

 

                    Influenza Virus Vaccine Quad .5 mL IM 6+ MO 2019, 10/1

2018 

 

                    Influenza Virus Vaccine Quad IM 3+ YRS 2016          

 

                    TDAP                2019          

 

                    TDAP (ADACEL) VACCINE 2016          



documented as of this encounter



Social History







             Tobacco Use  Types        Packs/Day    Years Used   Date

 

             Former Smoker                                        









                Smokeless Tobacco: Never Used                                 









                                        Comments: quit 2020 was smoking 1 

pack per day









                Alcohol Use     Drinks/Week     oz/Week         Comments

 

                No              0 Standard drinks or equivalent 0.0             









                    Pregnant            Estimated Date of Delivery Comments

 

                    Yes                 2021          Based on last menstr

ual period of 2020



                                                            (Approximate)









                          Sex Assigned at Birth     Date Recorded

 

                          Not on file               









                    Job Start Date      Occupation          Industry

 

                    Not on file         Not on file         Not on file









                    Travel History      Travel Start        Travel End









                                        No recent travel history available.









                    COVID-19 Exposure   Response            Date Recorded

 

                    In the last month, have you been in contact with No / Unsure

         2020  9:52 AM 

CDT



                    someone who was confirmed or suspected to have              

       



                    Coronavirus / COVID-19?                     



documented as of this encounter



Last Filed Vital Signs

Not on filedocumented in this encounter



Plan of Treatment







             Date         Type         Specialty    Care Team    Description

 

             2020   Technician Visit Maternal Fetal              



                                       Medicine                  

 

             2020   Routine Prenatal Visit [facility] Shravan Sidhu, 



                                                                FNP



                                        



                                                    [address] [address]

15



                                        



                                                                [phone] (Fax) 









                Health Maintenance Due Date        Last Done       Comments

 

                HPV VACCINES (1 - Female 2006                      



                2-dose series)                                  

 

                INFLUENZA VACCINE (#1) 2020,     



                                                10/18/2018,     



                                                2016      

 

                Depression Screening 2021,     



                                                2020      

 

                PAP SMEAR       2023,     



                                                2018      

 

                DTaP,Tdap,and Td Vaccines (3 2029,     



                - Td)                           2016      

 

                PNEUMOCOCCAL 0-64 YEARS Aged Out                        No longe

r eligible based



                COMBINED SERIES                                 on patient's age

 to



                                                                complete this to

pic



documented as of this encounter



Results

Not on filedocumented in this encounter



Visit Diagnoses







                                        Diagnosis

 

                                        Urinary tract infection without hematuri

a, site unspecified - Primary



documented in this encounter



Insurance







          Payer     Benefit Plan / Subscriber ID Effective Dates Phone     Addre

ss   Type



                    Group                                             

 

          [employer] [place] 2020-Present [id number] [address] 

  Medicaid



                      [place]                                       [id number] [place] [address] 



                                                            [id number] 



documented as of this encounter



Advance Directives







                Name            Relationship    Healthcare Agent Communication



                                                Relationship    

 

                Andrew George Spouse          Primary healthcare agent [phone]



                                                                (Mobile)[phone] (Home)

 

                Vicky Cartagena  Mother          [facility] [phone]





                                                agent           (Mobile)[phone] (Home)

## 2020-08-21 NOTE — XMS REPORT
Summary of Care

                             Created on:2020



Patient:Temi Cartagena

Sex:Female

:1995

External Reference #:[id number]





Demographics







                          Address                   [address]



                                                    [place] [address]

 

                          Mobile Phone              [phone]

 

                          Home Phone                [phone]

 

                          Email Address             [email or web address]



 

                          Preferred Language        English

 

                          Marital Status            Single

 

                          Jew Affiliation     Unknown

 

                          Race                      White

 

                          Ethnic Group               or 









Author







                          Organization              [facility]

 

                          Address                   [address] [place] [id number]









Support







                Name            Relationship    Address         Phone

 

                Andrew George Unavailable     [address] [phone]



                                                [place] [address] 

 

                Vicky Cartagena  Unavailable     [address] [phone]



                                                [place] [address] 









Care Team Providers







                    Name                Role                Phone

 

                    Pcp,  Does Not Have A Primary Care Provider [phone]









Reason for Referral

 (Routine)





           Status     Reason     Specialty  Diagnoses / Referred By Referred To



                                            Procedures Contact    Contact

 

                New Request                     Maternal Fetal  Diagnoses



Supervision of high risk pregnancy, antepartum Shravan Sidhu          



                                                Medicine        



Procedures



CONSULT MATERNAL FETAL MEDICINE ULTRASOUND Preferred Location: [facility]



                                        



                                                       [address] [place], [place] 



                                                                 [address]



                                        



                                                       Phone:     



                                                                 [phone]



                                        



                                                       Fax:       



                                                       [phone] 









Reason for Visit







                          Reason                    Comments

 

                          Initial Prenatal Visit    







Encounter Details







             Date         Type         Department   Care Team    Description

 

             2020   Initial Prenatal [facility] Shravan Sidhu

upervision of 

high risk pregnancy, antepartum (Primary Dx);



                                        Visit               [facility]



                                        Multiparity;



                                       [address] [facility]  History o

f herpes simplex type 2 infection;



                                                            Street



                                        [address]



                                        Nausea and vomiting during pregnancy cintia

or to 22 weeks gestation;



                                       [place], [place] History of depr

ession;



                                                            [id number]



                                        Tobacco abuse;



                                                [phone]



                                        Class 1 obesity with body mass index (BM

I) of 31.0 to 31.9 in adult, 

unspecified obesity type, unspecified whether serious comorbidity present;



                                                    [phone] BMI 31.0-31.9,a

dult



                                                    (Fax)        







Allergies







             Active Allergy Reactions    Severity     Noted Date   Comments

 

             Metoclopramide Hcl Anxiety                   10/14/2018   



documented as of this encounter (statuses as of 2020)



Medications







          Medication Sig       Dispensed Refills   Start     End       Status



                                                  Date      Date      

 

          proMETHazine 25 mg Take 1 tablet 30 tablet 0         20         

   Active



          tabletIndications: by mouth every                     18              

    



          Nausea and vomiting 6 (six) hours                                     

    



          during pregnancy as needed for                                        

 



          prior to 22 weeks Nausea and                                         



          gestation Vomiting (N/V).                                         

 

          ondansetron 4 mg Take 1 tablet 20 tablet 0         20           

 Active



          disintegrating by mouth every                     19                  



          tabletIndications: 8 (eight) hours                                    

     



          Colitis   as needed for                                         



                    Nausea and                                         



                    Vomiting (N/V).                                         

 

          prenatal vit Take 1 Packet 30 Each   6         20            Act

baltazar



          33-iron-folic-dha by mouth daily.                     20              

    



          (SELECT-OB + DHA) 29                                                  

 



          mg iron-1 mg -250 mg                                                  

 



          combo                                                       



          packIndications:                                                   



          Supervision of high                                                   



          risk pregnancy,                                                   



          antepartum                                                   

 

          proMETHazine 25 mg Insert 1  4 Suppository 1         20

   Discontinued



          suppository Suppository                     18        020       



                    into rectum                                         



                    every 4 (four)                                         



                    hours as needed                                         



                    for Nausea and                                         



                    Vomiting (N/V).                                         

 

          famotidine 20 mg Take 1 tablet 60 tablet 3         10/03/20  07/01/2  

 Discontinued



          tablet    by mouth 2                     18        020       



                    (two) times                                         



                    daily.                                            

 

          proCHLORperazine Insert 1  12 Suppository 0         10/14/20  07/01/2 

  Discontinued



          (COMPAZINE) 25 mg Suppository                     18        020       



          suppository into rectum                                         



                    every 12                                          



                    (twelve) hours                                         



                    as needed for                                         



                    Nausea and                                         



                    Vomiting (N/V).                                         

 

          proCHLORperazine 10 Take 1 tablet 30 tablet 3         10/15/20  07/01/

2   Discontinued



          mg tablet by mouth every                     18        020       



                    6 (six) hours                                         



                    as needed for                                         



                    Nausea and                                         



                    Vomiting (N/V).                                         

 

          doxylamine-pyridoxin Take 4 tablets 120 tablet 1         10/15/20  07/

01/2   Discontinued



          e, vit B6, by mouth at                     18        020       



          (DICLEGIS) 10-10 mg bedtime. Take 1                                   

      



          per       tab in the                                         



          tabletIndications: morning, 1 tab                                     

    



          Nausea and vomiting with lunch and                                    

     



          during pregnancy 2 tabs at                                         



          prior to 22 weeks bedtime.                                          



          gestation                                                   

 

          ciprofloxacin HCl Take 1 tablet 20 tablet 0         20 

  Discontinued



          500 mg    by mouth 2                             020       



          tabletIndications: (two) times                                        

 



          Colitis   daily.                                            

 

          metroNIDAZOLE 500 mg Take 1 tablet 14 tablet 0         20   Discontinued



          tabletIndications: by mouth 2                     19        020       



          Colitis   (two) times                                         



                    daily.                                            



documented as of this encounter (statuses as of 2020)



Active Problems







                          Problem                   Noted Date

 

                          History of herpes simplex type 2 infection 2020

 

                          Tobacco abuse             2020

 

                          BMI 31.0-31.9,adult       2020

 

                          Glucose tolerance test abnormal 2019

 

                          12 weeks gestation of pregnancy 10/14/2018

 

                          Rubella non-immune status, antepartum 10/10/2018

 

                          Dyspepsia                 10/03/2018

 

                          Class 1 obesity with body mass index (BMI) of 31.0 to 

31.9 in adult, 2018



                          unspecified obesity type, unspecified whether serious 

comorbidity present 

 

                          UTI (urinary tract infection) during pregnancy 

018









                                        Overview: 







                                        TONIE at next visit -neg









                          Supervision of high risk pregnancy in third trimester 

2018

 

                          Multiparity               2018

 

                          Marijuana use             2018









                                        Overview: 







                                        Reports due to nausea









                          Ovarian cyst              2018









                                        Overview: 







                                        Per outside [facility] see external provided re

cords









                          Atypical squamous cells of undetermined significance (

ASCUS) on 2018



                          Papanicolaou smear of cervix 









                                        Overview: 







                                        Repeat pap in 12 months









                          History of depression     2018

 

                          History of anxiety        2016

 

                          Nausea and vomiting during pregnancy prior to 22 weeks

 gestation 2016

 

                          Supervision of high risk pregnancy, antepartum 

016









                    Pregnant            Estimated Date of Delivery Comments

 

                    Yes                 2021          Based on last menstr

ual period of 2020



                                                            (Approximate)



documented as of this encounter (statuses as of 2020)



Resolved Problems







                    Problem             Noted Date          Resolved Date

 

                    Hyperemesis         10/06/2018          10/09/2018

 

                    Dehydration         2018          10/07/2018

 

                    11 weeks gestation of pregnancy 2018          10/09/20

18

 

                    Well woman exam     2018

 

                    Contraceptive management 2018

 

                    Nexplanon removal   2018









                                        Overview: 







                                        Per patient reports was only for 2 years









                    History of depression 2016

 

                    UTI in pregnancy, antepartum 2016









                                        Overview: 







                                        Still current uti as of 5-6-16









                    BV (bacterial vaginosis) 2016



documented as of this encounter (statuses as of 2020)



Immunizations







                    Name                Administration Dates Next Due

 

                    Influenza Virus Vaccine Quad .5 mL IM 6+ MO 2019, 10/1

2018 

 

                    Influenza Virus Vaccine Quad IM 3+ YRS 2016          

 

                    TDAP                2019          

 

                    TDAP (ADACEL) VACCINE 2016          



documented as of this encounter



Social History







             Tobacco Use  Types        Packs/Day    Years Used   Date

 

             Former Smoker                                        









                Smokeless Tobacco: Never Used                                 









                                        Comments: quit 2020 was smoking 1 

pack per day









                Alcohol Use     Drinks/Week     oz/Week         Comments

 

                No              0 Standard drinks or equivalent 0.0             









                    Pregnant            Estimated Date of Delivery Comments

 

                    Yes                 2021          Based on last menstr

ual period of 2020



                                                            (Approximate)









                          Sex Assigned at Birth     Date Recorded

 

                          Not on file               









                    Job Start Date      Occupation          Industry

 

                    Not on file         Not on file         Not on file









                    Travel History      Travel Start        Travel End









                                        No recent travel history available.









                    COVID-19 Exposure   Response            Date Recorded

 

                    In the last month, have you been in contact with No / Unsure

         2020  9:52 AM 

CDT



                    someone who was confirmed or suspected to have              

       



                    Coronavirus / COVID-19?                     



documented as of this encounter



Last Filed Vital Signs







                Vital Sign      Reading         Time Taken      Comments

 

                Blood Pressure  113/77          2020  9:53 AM CDT 

 

                Pulse           76              2020  9:53 AM CDT 

 

                Temperature     36.3 C (97.4 F) 2020  9:53 AM CDT 

 

                Respiratory Rate 16              2020  9:53 AM CDT 

 

                Oxygen Saturation -               -               

 

                Inhaled Oxygen Concentration -               -               

 

                Weight          86.4 kg (190 lb 8 oz) 2020  9:53 AM CDT 

 

                Height          165.1 cm (5' 5") 2020  9:53 AM CDT 

 

                Body Mass Index 31.7            2020  9:53 AM CDT 



documented in this encounter



Patient Instructions

Patient InstructionsMartin Greer RN - 2020  9:45 AM CDTFormatting of
this note might be different from the original.



Patient Education



Prevention Guidelines,Women Ages 18 to 39

Screening tests and vaccines are an important part of managing your health. A 
screening test is doneto find possible disorders or diseases in people who don't
have any symptoms. The goal is to find a disease early so lifestyle changes can 
be made and you can be watched more closely to reduce the riskof disease, or to 
detect it early enough to treat it most effectively. Screening tests are not 
considered diagnostic, but are used to determine if more testing is needed. 
Health counseling is essential, too. Below are guidelines for these, for women 
ages 18 to 39. Talk with your healthcare provider tomake sure youre 
up-to-date on what you need.

Screening Who needs it How often

Alcohol misuse All women in this age group At routine exams

Blood pressure All women in this age group Yearly checkup if your blood pressure
is normal

Normal blood pressure is less than 120/80 mm Hg

If your blood pressure reading is higher than normal, follow the advice of your 
healthcare provider

Breast cancer All women in this age group should talk with their healthcare 
providers about the needfor clinical breast exams (CBE)1 Clinical breast exam 
every 3 years1

Cervical cancer Women ages 21 and older Women between ages 21 and 29 should have
a Pap test every 3 years; women between ages 30 and 65 are advised to have a Pap
test plus an HPV test every 5 years

Chlamydia Sexually active women ages 25 and younger, and women at increased risk
for infection (suchas having multiple sex partners) Every year if you're at risk
or have symptoms

Depression All women in this age group At routine exams

Type 2 diabetes, prediabetes All women with no symptoms who are overweight or 
obese and have 1 or more other risk factors for diabetes At least every 3 years.
Also, testing for diabetes during pregnancy after the 24th week.

Type 2 diabetes, prediabetes All women diagnosed with gestational diabetes 
Lifelong testing every 3 years

Type 2 diabetes All women with prediabetes Every year

Gonorrhea Sexually active women at increased risk for infection At routine exams

Hepatitis C Anyone at increased risk At routine exams

HIV All women should be tested at least once for HIV between the ages of 13 and 
64 At routine exams.Those with risk factors for HIV should be tested at least 
annually.

Obesity All women in this age group At routine exams

Syphilis Women at increased risk for infection should talk with their healthcare
provider At routineexams

Tuberculosis Women at increased risk for infection should talk with their 
healthcare provider Ask your healthcare provider

Vision All women in this age group At least 1 complete exam in your 20s, and 2 
in your 30s

Vaccine2 Who needs it How often

Chickenpox (varicella) All women in this age group who have no record of this 
infection or vaccine 2doses; the second dose should be given 4 to 8 weeks after 
the first dose

Hepatitis A Women at increased risk for infection should talk with their 
healthcare provider 2 dosesgiven at least 6 months apart

Hepatitis B Women at increased risk for infection should talk with their 
healthcare provider 3 dosesover 6 months; second dose should be given 1 month 
after the first dose; the third dose should be given at least 2 months after the
second dose and at least 4 months after the first dose

Haemophilus influenzaeType B (HIB) Women at increased risk for infection 
should talk with their healthcare provider 1 to 3 doses

Human papillomavirus (HPV) All women in this age group up to age 26 3 doses; the
second dose should be given 1 to 2 months after the first dose and the third 
dose given 6 months after the first dose

Influenza (flu) All women in this age group Once a year

Measles, mumps, rubella (MMR) All women in this age group who have no record of 
these infections or vaccines 1 or 2 doses

Meningococcal Women at increased risk for infection should talk with their 
healthcare provider 1 or more doses

Pneumococcal conjugate vaccine (PCV13)and pneumococcal 
polysaccharidevaccine(PPSV23) Women at increased risk for infection should 
talk with their healthcare provider PCV13: 1 dose ages 19 to 65 (protects 
against 13 types of pneumococcal bacteria)

PPSV23: 1 to2 doses through age 64, or 1 dose at 65 or older (protects against
23 types of pneumococcal bacteria)



Tetanus/diphtheria/pertussis (Td/Tdap) booster All women in this age group Td 
every 10 years, or a one-time dose of Tdap instead of a Td booster after age 18,
then Td every 10 years

Counseling Who needs it How often

BRCA gene mutation testing for breast and ovarian cancer susceptibility Women 
with increased risk for having gene mutation When your risk is known

Breast cancer and chemoprevention Women at high risk for breast cancer When your
risk is known

Diet and exercise Women who are overweight or obese When diagnosed, and then at 
routine exams

Domestic violence Women at the age in which they are able to have children At 
routine exams

Sexually transmitted infection prevention Women who are sexually active At 
routine exams

Skin cancer Prevention of skin cancer in fair-skinned adults At routine exams

Use of tobacco and the health effects it can cause All women in this age group 
Every visit

1 According to the ACS, women ages 20 to 39 years should have a clinical breast 
exam (CBE) as part of their routine health exam every 3 years. Breast self-exams
are an option for women starting in their 20s.But the USPSTF does not 
recommend CBE.

[employer] last reviewed this educational content on 10/1/2017

 5642-2964 The [employer]. [address] [place] [address] 
[address]. All rights reserved. This information is not intended as a substitute for
professional medical care. Always follow your healthcare professional's 
instructions.







Patient Education



Understanding STIs



When it comes to sex, nothing is risk-free. Any sexual contact with the penis, 
vagina, anus, or mouth can spread a sexually transmitted infection (STI). These 
include chlamydia, gonorrhea, herpes, HIV,and genital warts. STIs are also known
as sexually transmitted diseases (STDs). The only sure way toprevent STIs is not
having sex (abstinence). But there are ways to make sex safer. Use a latex 
condom each time you have sex. And choose your partner wisely.

Use condoms for safer sex

If you have sex, latex condoms provide the best protection against STIs. Latex 
condoms stop the exchange of body fluids that carry certain STIs. They also 
limit contact with affected skin. Be aware that a condom doesnt cover all 
skin. So affected skin that isn't covered can still transfer disease. But 
youre safer with a condom than without one. Use a condom even if you use 
other birth control. Birth control methods such as the pill or IUD help prevent 
pregnancy, but they don't protect against STIs.

Choose the right condom

Condoms made of latex prevent disease best. If youre allergic to latex, use 
polyurethane condoms instead. Male condoms fit over the penis. Female condoms 
line the vagina. Before buying a condom, read the label to be sure it prevents 
disease. Some novelty condoms dont.

The right lubricant helps

Buy lubricated condoms or use lubricant. This provides greater comfort and 
reduces the risk for condom breakage. Use only water-based lubricants. Dont 
use oil, lotion, or petroleum jelly. They can weaken the condom, causing 
breakage. Also, you may want to choose lubricants without nonoxynol-9. This
spermicide may cause irritation. It can raise the risk for certain STIs.

Use condoms correctly

For condoms to work, they must be used the right way. Keep these tips in mind:

 Use a new latex condom each time you have sex. Slip the condom on the penis 
before any contact ismade.

 When ready to withdraw, hold the rim of the condom as the penis pulls out. 
This prevents the condom from slipping off.

 Check the expiration date before using a condom.

 Dont store condoms in places that can get hot, such as a car or a wallet 
that is carried in a back pocket.

Get to know your partner

Safer sex is a process. It involves getting to know your partner and making 
informed choices. Ask each other how many partners you have had in the past, and
how many you have now. Find out if either ofyou has HIV or any other STI. If you
decide to have sex, use a condom each time. Dont stop using condoms unless 
youre sure neither of you has other partners and youve both been tested to
confirm you dont have HIV or other STIs. Then stay free of disease by having 
sex only with each other (monogamy).

Keep your cool

Dont let alcohol or drugs cloud your judgment. They could lead you to have 
sex with someone you wouldnt have chosen if you were sober. Or you might 
forget to use a condom. If you do plan to have sex, keep a latex condom with 
you. Dont wait until youre in the heat of passion to try to find one.

Consider abstinence

The only way to be sure you wont get an STI is to abstain from sex. 
Abstinence is a choice that many people make at some point in their life. Maybe 
you want to wait until you are sure youre readybefore you have sex. Maybe 
youd like a break from the responsibilities of sex for a while. Or maybe you 
just want to know your partner better before taking the next step. Abstinence is
a choice you can make now to protect your future.

[employer] last reviewed this educational content on 2018-2020 The [employer]. [address] [place] [address], [place] 
[address]. All rights reserved. This information is not intended as a substitute for
professional medical care. Always follow your healthcare professional's 
instructions.







Patient Education



Understanding HIV and AIDS

It's important to know how HIV can get into your body and what happens once 
its there. Then youll be better prepared to protect yourself or others 
against this virus. A person with HIV can look and feel perfectly healthy. But 
that person can give HIV to others as soon as he or she is infected with the 
virus.

Having unsafe or unprotected sex or sharing needles puts you at risk for HIV. 
Talk with your healthcare provider about ways to protect yourself or a loved one
from getting HIV.

How HIV infection progresses

After HIV enters the body, it attacks the immune system in the stages below. A 
person with HIV can infect others once the virus gets into the blood.

HIV with no symptoms. A person with HIV may have no symptoms for years. The only
sign of infection may be a positive blood test for HIV 2 weeks to 3 months or 
later after HIV enters the body.

HIV with symptoms. Some people develop an illness similar to mono 
(mononucleosis) 2 to 4 weeks after the virus enters the body. This is called 
acute retroviral syndrome. Symptoms may include swollen lymph glands, chills, 
fever, night sweats, weakness, weight loss, skin rashes, mouth ulcers, or sore t
hroat. Symptoms may be mild or the person can feel quite sick. Even without 
treatment the symptoms almost always go away in a few days or up to 2 to 3 
weeks. Then the person has no symptoms, often for years. But over time the 
immune system starts to get weaker and symptoms start appearing. People at this 
stage may have a yeast infection in the mouth (oral thrush), shingles, skin 
problems, pneumonia, diarrhea that keeps coming back, or weight loss.

AIDS. AIDS is the most advanced stage of HIV infection, when the immune system 
is severely weakened.Certain rare diseases and cancers that normally would not 
occur, now can occur because the body can no longer fight them well enough. It 
is often these diseases that cause death in people with AIDS. HIV may also 
directly attack the brain and nervous system. This causes seizures and loss of 
memory and body movement. It also affects many other parts of the body. This 
leads to problems such as anemia, low white blood cell count, diarrhea, belly 
pain, skin problems, and many others.



How HIV enters the body

HIV is carried in semen, vaginal fluid, blood, and breastmilk.

 During sex, HIV can enter the body. It gets in through the fragile tissue and
linings, sores, or cuts in or around the vagina, penis, anus, and mouth.

 During drug use, tattooing, or body piercing, the virus can enter the blood 
through an infected needle.

 A mother who has HIV can infect her child during pregnancy, childbirth, and 
breastfeeding.

StayWell last reviewed this educational content on 2019-2020 [employer]. [address] [place] [address], [place] 
[address]. All rights reserved. This information is not intended as a substitute for
professional medical care. Always follow your healthcare professional's 
instructions.







Patient Education



Clinical Breast Exam



Many health organizations recommend a yearly clinical breast exam. This exam may
be done by a gynecologist, family healthcare provider, nurse practitioner, nurse
midwife, or specially trained nurse. Yearly breast exams help tomake 
surethat breast conditions are found early.

Your healthcare providers role

A healthcare professional knows the tests and follow-up care needed if a problem
is found. Your clinical exam is also a great time to ask questions about breast 
self-exams. You can find out if yourechecking your breasts in the best way. 
Or you may want to ask how pregnancy, breast implants, or breast reduction 
surgery affect the way you should check your breasts.

Diagnostic tests

If a clinical exam reveals a breast change, you may have other tests to find out
more. These tests may include:

 Mammography. A low-dose X-ray of your breast tissue.

 Ultrasound. An imaging test that uses sound waves to create images of your 
breast.

 Biopsy. A small amount of breast tissue is removed by needle or by a cut 
(incision). The tissue is then checked under a microscope.

Guidelines for having clinical breast exams

The American College of Obstetricians and Gynecologists recommends that starting
at age 29, you should have a clinical breast exam every 1 to 3 years. After age 
40, have a clinical breast exam each year. If youre at higher risk for breast
cancer, you may need exams more often. Risk factors for breast cancer may 
include:

 Being over 50 or postmenopausal

 Having a family history of breast cancer

 Having the BRCA1 or BRCA2 gene mutation or certain other gene mutations

 Having more menstrual periods due to starting menstruation early(before age
12) or having a late menopause (after age 55)

 Having no pregnancies

 Having a first pregnancy after age 30

 Being obese

 Having a history of radiation treatment to your chest area

 Exposure to REYNOLD during your mother's pregnancy

 Not being active

 Drinking too much alcohol

 Having dense breast tissue

 Taking hormone therapy after menopause

Other health organizations have different recommendations. Talk with your 
healthcare provider about what is best for you.

[employer]Well last reviewed this educational content on 2017 [employer]. [address] [place] [address], [place] 
[address]. All rights reserved. This information is not intended as a substitute for
professional medical care. Always follow your healthcare professional's 
instructions.







Patient Education



Breast Health: Breast Self-Awareness

What is breast self-awareness?

Breast self-awareness is knowing how your breasts normally look and feel. Your 
breasts change as yougo through different stages of your life. So its 
important to learn what is normal for your breasts. Knowing about your breasts 
helps you spot any changes in them right away. Tell your healthcare provider 
about any changes.

Why is breast self-awareness important?

Many experts now say that women should focus on breast self-awareness instead of
doing a breast self-examination (BSE). These experts include the American Cancer
Society and the American Congress of Obstetricians and Gynecologists. Some 
experts even advise not teaching women to do a BSE. Thats because research 
hasnt shown a clear benefit to doing BSEs.

Breast self-awareness is different than a BSE. It isnt about following a 
certain method and schedule. Its about knowing what's normal for your 
breasts. That way you can spot even small changes right away. If you see any 
changes, tell your healthcare provider.

Changes to look for

Call your healthcare provider if you find any changes in your breasts that worry
you. These changes may be:

 A lump

 Nipple discharge other than breastmilk, especially if it's bloody

 Swelling

 A change in size or shape

 Skin changes, such as redness, thickening, or dimpling of the skin

 Swollen lymph nodes in the armpit

 Nipple problems, such as pain or redness

If you find a lump

Call your provider if you find lumpiness in one breast. Also call if you feel 
something different inthe tissue or feel a definite lump. Sometimes lumpiness 
may be due to menstrual changes. But there may be reason for concern.

Your provider may want to see you right away if you have:

 Nipple discharge that is bloody

 Skin changes on your breast, such as dimpling or puckering

Its okay to be upset if you find a lump. Be sure to call your provider right 
away. Remember that most breast lumps are benign. This means they are not 
cancer.

[employer] last reviewed this educational content on 2017-2020 [employer]. [address] [place] [address] 
[address]. All rights reserved. This information is not intended as a substitute for
professional medical care. Always follow your healthcare professional's 
instructions.







Patient Education



Understanding USDA MyPlate

The USDA (U.S. Department of Agriculture) has guidelines to help you make 
healthy food choices. These are called MyPlate. MyPlate shows the food groups 
that make up healthy meals using the image of a place setting. Before you eat, 
think about the healthiest choices for what to put onto your plate or into your 
cup or bowl. To learn more about building a healthy plate, visit 
www.choosemyplate.gov.



The food groups

 Fruits. Any fruit or 100% fruit juice counts as part of the Fruit Group. 
Fruits may be fresh, canned, frozen, or dried, and may be whole, cut-up, or 
pureed. Make half your plate fruits and vegetables.

 Vegetables. Any vegetable or 100% vegetable juice counts as a member of the 
Vegetable Group. Vegetables may be fresh, frozen, canned, or dried. They can be 
served raw or cooked and may be whole, cut-up, or mashed. Make half your plate 
fruits and vegetables.

 Grains. All foods made from grains are part of the Grains Group. These 
include wheat, rice, oats,cornmeal, and barley such as bread, pasta, oatmeal, 
cereal, tortillas, and grits. Grains should be no more than a quarter of your 
plate. At least half of your grains should be whole grains.

 Protein. This group includes meat, poultry, seafood, beans and peas, eggs, 
processed soy products(like tofu), nuts (including nut butters), and seeds. Make
protein choices no more than a quarter ofyour plate. Meat and poultry choices 
should be lean or low fat.

 Dairy. All fluid milk products and foods made from milk that contain calcium,
like yogurt and cheese, are part of the Dairy Group. (Foods that have little 
calcium, such as cream, butter, and cream cheese, are not part of the group.) 
Most dairy choices should be low-fat or fat-free.

 Oils. These are fats that are liquid at room temperature. They include 
canola, corn, olive, soybean, and sunflower oil. Foods that are mainly oil 
include mayonnaise, certain salad dressings, and soft margarines. You should 
have only 5 to 7 teaspoons of oils a day. You probably already get this muchfrom
the food you eat.

[employer] last reviewed this educational content on 2017-2020 [employer]. [address] [place] [address] 
[address]. All rights reserved. This information is not intended as a substitute for
professional medical care. Always follow your healthcare professional's 
instructions.







Patient Education



The Range of Pap Test Results

When your Pap test is sent to the lab, the lab studies your cell samples and 
reports any abnormal cell changes. Your healthcare provider can discuss these 
changes with you. In some cases, an abnormal Pap test is due to an infection. 
More serious cell changes range from dysplasia to cancer. Talk to your 
healthcare provider about your Pap test.



Normal results

Cervical cells, even normal ones, are always changing. As they mature, normal 
squamous cells move from deeper layers within the cervix. Over time, these cells
flatten and cover the surface of the cervix. Within the cervical canal, the 
cells are different. These glandular cells are taller and not as flat as the 
cells on the surface of the cervix. When a Pap test sample shows healthy cells 
of both types, the results are negative. Keep having Pap tests as often as 
directed.

Abnormal results

A positive Pap test result means some cells in the sample showed abnormal 
changes. These results aregrouped by the type of cell change and the location, 
or extent, of the changes. Depending on the results, you may need further 
testing.

 Inflammation. Noncancerous changes are present. They may be due to normal 
cell repair. Or, they may be caused by an infection, such as HPV or yeast. 
Further testing may be needed. (Also called reactive cellular changes.)

 Atypical squamous cells. Test results are unclear. Cells on the surface of 
the cervix show changes, but their significance is not yet known. Testing for 
HPV and other sexually transmitted infections(STIs) may be needed. Treatment may
be required. (Reported as ASC-US or ASC-H.)

 Atypical glandular cells. Cells lining the cervical canal show abnormal 
changes. Further testing is likely. You may also have treatment to destroy or 
remove problem cells. (Reported as AGC.)

 Mild dysplasia. Cells show distinct changes. More testing or HPV typing may 
be done. You may alsohave treatment to destroy or remove problem cells. 
(Reported as low-grade JARRETT or VERONICA 1.)

 Moderate to severe dysplasia. Cells show precancerous changes. Or, 
noninvasive cancer (carcinoma in situ) may be present. Treatment to destroy or 
remove problem cells is likely. (Reported as high-grade JARRETT or VERONICA 2 or VERONICA 3.)

 Cancer. Different types of cancer may be detected by your Pap test. More 
tests to assess the cancer's extent are likely. The type of treatment will 
depend on the test results and other factors, suchas age and health history. 
(Reported as squamous cell carcinoma, endocervical adenocarcinoma in situ, or 
adenocarcinoma.)

[employer] last reviewed this educational content on 2017-2020 [employer]. [address] [place] [address], [place] [address]. All rights reserved. This information is not intended as a substitute for
professional medical care. Always follow your healthcare professional's 
instructions.





Electronically signed by Martin Greer RN at 2020  9:55 AM CDT

documented in this encounter



Progress Notes

Shravan Sidhu, RAYP - 2020  9:45 AM CDTFormatting of this note might 
be different from the original.

Chief complaint:

Chief Complaint

Patient presents with

 Initial Prenatal Visit



HPI

.CC: Initial Prenatal Visit



Temi Cartagena is a 25 year old, , /White female. 
Patient's last menstrual period was 2020 (approximate).  She is 10w2d with
an intrauterine pregnancy. Her Estimated Date of Delivery: 21.  She is 
being seen today for her first obstetrical visit. Patient complains of nausea 
and vomiting since finding out of pregnancy, but was placed on medication at ER 
visit  Patient is unsure of LMP but know it was before 2020. Patient 
reports H/O Depression no medication dn denies nay symptoms today. She reports 
+FM and denies contractions, LOF and bleeding today.Patient denies current or 
past physical, sexual or emotional abuse.



OB History

 Para Term  AB Living

3 2 2     2

SAB TAB Ectopic Multiple Live Births

        2



# Outcome Date GA Lbr Sánchez/2nd Weight Sex Delivery Anes PTL Lv

3 Current

2 Term 19 40w1d   F Vag-Spont   REINA

1 Term 10/23/16 41w0d   F VAGINAL   [facility]



Histories

OB History

 Para Term  AB Living

3 2 2     2

SAB TAB Ectopic Multiple Live Births

        2



# Outcome Date GA Lbr Sánchez/2nd Weight Sex Delivery Anes PTL Lv

3 Current

2 Term 19 40w1d   F Vag-Spont   REINA

1 Term 10/23/16 41w0d   F VAGINAL   REINA



Past Medical History:

Diagnosis Date

 Anxiety

 no meds

 Atypical squamous cells of undetermined significance (ASCUS) on Papanicolaou
smear of cervix 2018

 BV (bacterial vaginosis) 3/9/2016

 Depression

 denies si/hi, no meds

 Genital herpes 

 Insomnia

 no meds

 Marijuana use 2018

 Ovarian cyst



Family History

Problem Relation Age of Onset

 Diabetes Maternal Grandmother

 Heart Maternal Grandmother

 High cholesterol Maternal Grandmother

 Hypertension Maternal Grandmother

 Depression Mother

 Cancer Mother

     Thyroid

 Anxiety Mother

 Cancer Sister

     cervical

 Arthritis NoFHx

 Asthma NoFHx

 Birth defects NoFHx

 Breast Cancer NoFHx

 Colon Cancer NoFHx

 Ovarian Cancer NoFHx

 Genetic NoFHx

 Mental retardation NoFHx

 Neurological NoFHx

 Osteoporosis NoFHx

 Psychiatry NoFHx

 Other - see comments NoFHx



Family Status

Relation Name Status

 MGMo  (Not Specified)

 Mo  (Not Specified)

 Sis  (Not Specified)

 NoFHx  (Not Specified)



History reviewed. No pertinent surgical history.

Social History



Socioeconomic History

 Marital status: Single

  Spouse name: Not on file

 Number of children: Not on file

 Years of education: Not on file

 Highest education level: Not on file

Occupational History

 Not on file

Social Needs

 Financial resource strain: Not on file

 Food insecurity:

  Worry: Not on file

  Inability: Not on file

 Transportation needs:

  Medical: Not on file

  Non-medical: Not on file

Tobacco Use

 Smoking status: Former Smoker

 Smokeless tobacco: Never Used

 Tobacco comment: quit 2020 was smoking 1 pack per day

Substance and Sexual Activity

 Alcohol use: No

  Alcohol/week: 0.0 standard drinks

 Drug use: Yes

  Types: Marijuana

  Comment: 2018

 Sexual activity: Yes

  Partners: Male

  Comment: Last sexual intercourse 2020

Lifestyle

 Physical activity:

  Days per week: Not on file

  Minutes per session: Not on file

 Stress: Not on file

Relationships

 Social connections:

  Talks on phone: Not on file

  Gets together: Not on file

  Attends Zoroastrian service: Not on file

  Active member of club or organization: Not on file

  Attends meetings of clubs or organizations: Not on file

  Relationship status: Not on file

 Intimate partner violence:

  Fear of current or ex partner: Not on file

  Emotionally abused: Not on file

  Physically abused: Not on file

  Forced sexual activity: Not on file

Other Topics Concern

 Not on file

Social History Narrative

 Pt denies current or past physical, sexual or emotional abuse.

 Pt has no pets.

 Pt lives with her parents.



Social History



Substance and Sexual Activity

Sexual Activity Yes

 Partners: Male

 Comment: Last sexual intercourse 2020



Genetic Screen

Autism / Mental Retardation: No

Canavan Disease: No

Congenital Heart Defect: No

Cystic Fibrosis: No

Down Syndrome: No

Familial Dysautonomia: No

Hemophilia or other Blood Disorders: No

[place] Chorea: No

Maternal Metabolic Disorder--specify (eg. Type 1 Diabetes, PKU): No

Muscular Dystrophy: No

Neural Tube Defect: No

Recurrent Pregnancy Loss or a Stillbirth: No

Sickle Cell Disease or Trait: No

Ab Sachs: No

Teratological Substances (specify type &amp; strength/dose) since LMP: No

Thalassemia: No

Other Inherited Genetic or Chromosomal Disorder (specify): No

No Significant History of Genetic Disorders: No Significant History of Genetic 
Disorders



Labs

Labs are pending.



Radiology

No new radiology.



Allergies

Temi is allergic to reglan [metoclopramide hcl].



Medications

Temi has a current medication list which includes the following 
prescription(s): prenatal vit 33-iron-folic-dha, ondansetron, and promethazine.



Review of Systems

Constitutional: Negative for activity change, appetite change, fatigue, 
unexpected weight change, weight gain and weight loss.

HENT: Negative for sore throat.

Eyes: Negative for visual disturbance.

Respiratory: Negative for cough and shortness of breath.

Breasts: Negative for discharge, mass, pain and unequal size.

Cardiovascular: Negative for chest pain, palpitations and leg swelling.

Gastrointestinal: Positive for nausea and vomiting. Negative for abdominal pain,
anal bleeding, blood in stool, constipation, diarrhea and rectal pain.

Genitourinary: Negative for bladder incontinence, dysuria, urgency, flank pain, 
vaginal bleeding, vaginal discharge, genital sores, vaginal pain and pelvic 
pain.

Skin: Negative for color change and rash.

Neurological: Negative.  Negative for dizziness, syncope and headaches.

Psychiatric/Behavioral: Negative for confusion, self-injury and sleep 
disturbance. The patient is not nervous/anxious.

Hematological: Negative for cold intolerance and heat intolerance.

Endocrine: Negative for hair loss, cold intolerance, heat intolerance, weight 
gain and weight loss.



/77 (BP Location: Right arm, Patient Position: Sitting, BP CUFF SIZE: 
Adult Medium)  | Pulse 76  | Temp 36.3 C (97.4 F) (Oral)  | Resp 16  | Ht 5'
5" (1.651 m)  | Wt 190 lb 8 oz (86.4 kg)  | LMP 2020 (Approximate)  | BMI 
31.70 kg/m

Pregravid BMI: 33.3



Physical Exam

Vitals reviewed.

Constitutional: She is oriented to person, place, and time. She appears well-
developed, well-nourished and well-groomed. She has no deformities.

Neck: No tenderness and no mass. No thyroid nodules and no thyromegaly palpated.

Cardiovascular: Regular rate and rhythm. No murmur auscultated.

Pulmonary/Chest: Breath sounds clear to auscultation. Normal inspiratory effort.

Abdominal: Abdomen is soft. No mass palpated. No tenderness present. There is no
guarding.

Neuro/Psychiatric: She has a normal mood and affect. She is oriented to person, 
place, and time.

Skin: Skin normal. No lesion and no rash present.





Tattoo present on bilateral arms



Breast: Right breast exhibits no mass, no nipple discharge and no tenderness. 
Left breast exhibits no mass, no nipple discharge and no tenderness. Normal left
breast and normal right breast

Rectal: normal rectum

External genitalia: Normal external genitalia appropriate for age. Normal hair 
distribution. No labial lesion.

     Chaperone present for the exam: MANUEL Kincaid Student

Vagina:Normal vagina. No lesion inspected. No abnormal vaginal discharge found.

Cervix: Normal cervix. No lesion. No tenderness and no discharge present.

Uterus: Uterus is normal size and non-tender.

     10cm Normal uterus

Adnexa: Right adnexa without tenderness or mass. Left adnexa without tenderness 
or mass. Normal leftadnexa and normal right adnexa

Anus/perineum: Normal perineum.



PRENATAL PHYSICAL:

General Exam:

HEENT: Normal

Thyroid: Normal

Lymph Node: Normal

Neurological: Normal

Abdomen: Normal

Skin: Normal

Extremities: Normal



Pelvic Exam:

Vulva: Normal

Vagina: Normal

    Chaperone present for the exam: MANUEL Kincaid Student

Cervix: Normal

Uterus: 8cm  Weeks

Adnexa:  Normal

Spines: Average

Sacrum: Concave

Subpubic Arch: Normal





Assessment/Plan

Supervision of high risk pregnancy, antepartum  (primary encounter diagnosis)

Multiparity

Comment: Routine NOB

Plan: POCT PREGNANCY TEST, POCT URINALYSIS W/O

      SPECIFIC GRAVITY, GLUCOSE 1 HOUR POST PRANDIAL,

      PAP Smear-Liquid Based, GC &amp; CHLAMYDIA

      AMPLIFIED ASSAY, TRICHOMONAS AMPLIFIED ASSAY,

      CBC WITH DIFF, HEPATITIS B SURFACE ANTIGEN, HIV

      1/2 AG-AB WITH REFLEX, POCT URINALYSIS W

      SPECIFIC GRAVITY, PRENATAL WORKUP, BLOOD BANK,

      RUBELLA SCREEN (TRENT) IGG, GALV ONLY -

      SYPHILIS IGG/IGM, URINE CULTURE, VZV ANTIBODY

      SCREEN, CONSULT MATERNAL FETAL MEDICINE

      ULTRASOUND Preferred Location: [place],

      prenatal vit 33-iron-folic-dha (SELECT-OB +

      DHA) 29 mg iron-1 mg -250 mg combo pack

      Denies zika virus risk, signs and symptoms such as fever,rash,joint pain, 
conjunctivitis (red eyes), muscle pain, headaches; outside [place] travel to areas 
affected by zika, and FOB exposure to zika.Educated on use of mosquito 
repellent. Covid x12 screening done, screening results are negative.



History of herpes simplex type 2 infection

Comment: hisotry

Plan: will treat at 36wks



Nausea and vomiting during pregnancy prior to 22 weeks gestation

Comment: on medication

Plan: will continue to monitor



History of depression

Comment: history of depression, denies any depression or any other thoughts or 
feelings or SI and HI

Plan: will continue to monitor



Tobacco abuse

Comment: stopped with pregnancy

Plan: smoking cessation



Class 1 obesity with body mass index (BMI) of 31.0 to 31.9 in adult, unspecified
obesity type, unspecified whether serious comorbidity present

BMI 31.0-31.9,adult

Comment: BMI: 31.70

Plan: Patient encouraged to limit weight gain and advised to eat healthy diet, 
fruits, vegetables, increased fiber and water intake and protein low in fat.  
Encouraged exercise for 30 min everyday; begin regimen with caution to prevent 
injury.  Encouraged to decrease BMI to &lt;25.



Return to clinic in 4 weeks.

Discussed treatment options.

Medications as ordered.

Reviewed patient instructions and provided printed copy.



This visit did not involve counseling and coordination that comprised more than 
50% of the visit time.

[place]  2020  10:52 AM

Electronically signed by Shravan Sidhu FNP at 2020 10:54 AM CDT
Martin Greer RN - 2020  9:45 AM CDTPatient is 25 year old female 
here for current pregnancy. Patient is .



1) Previous delivery methods   vaginal

2) Patient is mild/moderate experiencing cramping

3) Patient is not experiencing bleeding.

4) LMP   2020 approximate

5) Last Pap was: 2018 Results:   ASCUS

6) Have you had a flu vaccine this season?   NO

7) PPD candidate?   NO

8) Patient complains of  Severe nausea on meds from ED. Mild/Moderate Cramping

9) Patient denies history of physical, emotional, or sexual abuse. Patient 
states she currently feels  safe at home.



Electronically signed by Martin Greer RN at 2020 10:54 AM CDT
documented in this encounter



Plan of Treatment







             Date         Type         Specialty    Care Team    Description

 

                2020      Routine Prenatal Visit [facility]   IKE Sidhu, FNP



                                        



                                                                [address] [address]

15



                                        



                                                                [phone] (Fax) 









             Name         Type         Priority     Associated Diagnoses Date/Ti

me

 

             GLUCOSE 1 HOUR POST LAB          Routine      Supervision of high r

isk 2020 11:00 AM



             PRANDIAL                               pregnancy, antepartum CDT

 

             PAP Smear-Liquid Based LAB          Routine      Supervision of hig

h risk 2020 11:09 AM



                                                    pregnancy, antepartum CDT

 

             GC & CHLAMYDIA AMPLIFIED LAB          Routine      Supervision of h

igh risk 2020 11:09 

AM



             ASSAY                                  pregnancy, antepartum CDT

 

             TRICHOMONAS AMPLIFIED LAB          Routine      Supervision of high

 risk 2020 11:09 AM



             ASSAY                                  pregnancy, antepartum CDT

 

             CBC WITH DIFF LAB          Routine      Supervision of high risk  11:00 AM



                                                    pregnancy, antepartum CDT

 

             HEPATITIS B SURFACE LAB          Routine      Supervision of high r

isk 2020 11:00 AM



             ANTIGEN                                pregnancy, antepartum CDT

 

             HIV 1/2 AG-AB WITH REFLEX LAB          Routine      Supervision of 

high risk 2020 11:00 

AM



                                                    pregnancy, antepartum CDT

 

             RUBELLA SCREEN (TRENT) LAB          Routine      Supervision of hig

h risk 2020 11:00 AM



             IGG                                    pregnancy, antepartum CDT

 

             GALV ONLY - SYPHILIS LAB          Routine      Supervision of high 

risk 2020 11:00 AM



             IGG/IGM                                pregnancy, antepartum CDT

 

             URINE CULTURE LAB          Routine      Supervision of high risk  11:09 AM



                                                    pregnancy, antepartum CDT

 

             VZV ANTIBODY SCREEN LAB          Routine      Supervision of high r

isk 2020 11:00 AM



                                                    pregnancy, antepartum CDT

 

             CBC WITH DIFFERENTIAL LAB          Routine      Supervision of high

 risk 2020 11:00 AM



                                                    pregnancy, antepartum CDT









             Name         Type         Priority     Associated Diagnoses Order S

chedule

 

             GLUCOSE 1 HOUR POST LAB          Routine      Supervision of high r

isk Expected: 2020,



             PRANDIAL                               pregnancy, antepartum 

s: 2021

 

             PAP Smear-Liquid Based LAB          Routine      Supervision of hig

h risk Expected: 

2020,



                                                    pregnancy, antepartum 

s: 2021

 

             GC & CHLAMYDIA AMPLIFIED LAB          Routine      Supervision of h

igh risk Expected: 

2020,



             ASSAY                                  pregnancy, antepartum 

s: 2021

 

             TRICHOMONAS AMPLIFIED LAB          Routine      Supervision of high

 risk Expected: 2020,



             ASSAY                                  pregnancy, antepartum 

s: 2021

 

             CBC WITH DIFF LAB          Routine      Supervision of high risk Ex

pected: 2020,



                                                    pregnancy, antepartum 

s: 2021

 

             HEPATITIS B SURFACE LAB          Routine      Supervision of high r

isk Expected: 2020,



             ANTIGEN                                pregnancy, antepartum 

s: 2021

 

             HIV 1/2 AG-AB WITH LAB          Routine      Supervision of high ri

sk Expected: 2020,



             REFLEX                                 pregnancy, antepartum 

s: 2021

 

             POCT URINALYSIS W LAB          Routine      Supervision of high ris

k 20 Occurrences starting



             SPECIFIC GRAVITY                           pregnancy, antepartum  until



                                                                 2021

 

             PRENATAL WORKUP, BLOOD LAB          Routine      Supervision of hig

h risk Expected: 

2020,



             BANK                                   pregnancy, antepartum 

s: 2021

 

             RUBELLA SCREEN (TRENT) LAB          Routine      Supervision of hig

h risk Expected: 

2020,



             IGG                                    pregnancy, antepartum 

s: 2021

 

             GALV ONLY - SYPHILIS LAB          Routine      Supervision of high 

risk Expected: 2020,



             IGG/IGM                                pregnancy, antepartum 

s: 2021

 

             URINE CULTURE LAB          Routine      Supervision of high risk Ex

pected: 2020,



                                                    pregnancy, antepartum 

s: 2021

 

             VZV ANTIBODY SCREEN LAB          Routine      Supervision of high r

isk Expected: 2020,



                                                    pregnancy, antepartum 

s: 2021









                Health Maintenance Due Date        Last Done       Comments

 

                HPV VACCINES (1 - Female 2006                      



                2-dose series)                                  

 

                INFLUENZA VACCINE (#1) 2020,     



                                                10/18/2018,     



                                                2016      

 

                PAP SMEAR       2021      

 

                Depression Screening 2021,     



                                                2020      

 

                DTaP,Tdap,and Td Vaccines (3 2029,     



                - Td)                           2016      

 

                PNEUMOCOCCAL 0-64 YEARS Aged Out                        No longe

r eligible based



                COMBINED SERIES                                 on patient's age

 to



                                                                complete this to

pic



documented as of this encounter



Procedures







             Procedure Name Priority     Date/Time    Associated Diagnosis Comme

nts

 

             POCT URINALYSIS W/O Routine      2020 10:00 Supervision of hi

gh Results for 

this



             SPECIFIC GRAVITY              AM CDT       risk pregnancy, procedur

e are in



                                                    antepartum   the results



                                                                 section.

 

             POCT PREGNANCY TEST Routine      2020 10:00 Supervision of hi

gh Results for 

this



                                       AM CDT       risk pregnancy, procedure ar

e in



                                                    antepartum   the results



                                                                 section.



documented in this encounter



Results

POCT URINALYSIS W/O SPECIFIC GRAVITY (2020 10:00 AM CDT)





             Component    Value        Ref Range    Performed At [facility]

nature

 

             POCT PH U    5            5 - 8 mg/dl               

 

             POCT U LEUK EST Trace        Negative - Negative              

 

             POCT U NIT   Pos          Negative - Negative              

 

             POCT U PROT  Trace        Negative - Negative              

 

             POCT U GLU   Neg          Negative - Negative              

 

             POCT U KETONE 2+           Negative - Negative              

 

             POCT U BLD   Neg          Negative - Negative              









                                        Specimen

 

                                        Urine - URINE, CLEAN CATCH



POCT PREGNANCY TEST (2020 10:00 AM CDT)





             Component    Value        Ref Range    Performed At [facility]

nature

 

             POCT PREG    Positive                               

 

             On board controls acceptable Yes                                   

 



             with C Line                                         

 

             POCT PREG LOT #                                        

 

             POCT PREG TEST  DATE                                        









                                        Specimen

 

                                        Urine - URINE, CLEAN CATCH



documented in this encounter



Visit Diagnoses







                                        Diagnosis

 

                                        Supervision of high risk pregnancy, ante

partum - Primary

 

                                        Multiparity

 

                                        History of herpes simplex type 2 infecti

on







                                        Personal history of other infectious and

 parasitic disease

 

                                        Nausea and vomiting during pregnancy cintia

or to 22 weeks gestation

 

                                        History of depression







                                        Personal history of other mental disorde

r

 

                                        Tobacco abuse







                                        Tobacco use disorder

 

                                        Class 1 obesity with body mass index (BM

I) of 31.0 to 31.9 in adult, unspecified



                                        obesity type, unspecified whether seriou

s comorbidity present

 

                                        BMI 31.0-31.9,adult







                                        Body Mass Index 31.0-31.9, adult



documented in this encounter



Insurance







          Payer     Benefit Plan / Subscriber ID Effective Dates Phone     Addre

ss   Type



                    Group                                             

 

          [facility]      MEDICAID OF xxxxxxxxx 2020-Present [phone] [address] 

  Medicaid



                      [place]                                       [id number] [place] [address] 



                                                            [id number] 









           Guarantor Name Account Type Relation to Date of    Phone      Billing

 Address



                                 Patient    Birth                 

 

           Selene Cartagenah Personal/Famil Self       1995 [phone]                                (Home)     [place], [place]



                                                                  [id number]



documented as of this encounter



Advance Directives







                Name            Relationship    Healthcare Agent Communication



                                                Relationship    

 

                Andrew George Spouse          Primary healthcare agent [phone]

[phone]



                                                                (Mobile)[phone] (Home)

 

                Vicky Cartagena  Mother          [id number]





                                                agent           (Mobile)[phone] (Home)

## 2020-08-21 NOTE — XMS REPORT
Summary of Care

                             Created on:2020



Patient:Temi Cartagena

Sex:Female

:1995

External Reference #:[id number]





Demographics







                          Address                   [address]



                                                    [place] [address]

 

                          Mobile Phone              [phone]

 

                          Home Phone                [phone]

 

                          Email Address             [email or web address]

[email or web address]

 

                          Preferred Language        English

 

                          Marital Status            Single

 

                          Jainism Affiliation     Unknown

 

                          Race                      White

 

                          Ethnic Group               or 









Author







                          Organization              [facility]

 

                          Address                   [address] [place] [address]









Support







                Name            Relationship    Address         Phone

 

                Andrew George Unavailable     [address] [phone]



                                                [place] [address] 

 

                Vicky Cartagena  Unavailable     [address] [phone]



                                                [place] [address] 









Care Team Providers







                    Name                Role                Phone

 

                    Pcp,  Does Not Have A Primary Care Provider [phone]









Reason for Visit







                          Reason                    Comments

 

                          URINARY TRACT INFECTION   







Encounter Details







             Date         Type         Department   Care Team    Description

 

             2020   Telephone    [facility]- Shravan Sidhu, UR

INARY TRACT



                                                            [place]



                                        FN



                                        INFECTION



                                                [address] [facility] [address] [place] [address]



                                        



                                                [place]    [phone] [address] [phone] (Fax)        



                                       [phone]              







Allergies







             Active Allergy Reactions    Severity     Noted Date   Comments

 

             Metoclopramide Hcl Anxiety                   10/14/2018   



documented as of this encounter (statuses as of 2020)



Medications







          Medication Sig       Dispensed Refills   Start Date End Date  Status

 

          proMETHazine 25 mg Take 1 tablet 30 tablet 0         2018       

    Active



          tabletIndications: by mouth every                                     

    



          Nausea and vomiting 6 (six) hours                                     

    



          during pregnancy prior as needed for                                  

       



          to 22 weeks gestation Nausea and                                      

   



                    Vomiting (N/V).                                         

 

          ondansetron 4 mg Take 1 tablet 20 tablet 0         2019         

  Active



          disintegrating by mouth every                                         



          tabletIndications: 8 (eight) hours                                    

     



          Colitis   as needed for                                         



                    Nausea and                                         



                    Vomiting (N/V).                                         

 

          prenatal vit Take 1 Packet 30 Each   6         2020           Ac

tive



          33-iron-folic-dha by mouth daily.                                     

    



          (SELECT-OB + DHA) 29                                                  

 



          mg iron-1 mg -250 mg                                                  

 



          combo packIndications:                                                

   



          Supervision of high                                                   



          risk pregnancy,                                                   



          antepartum                                                   

 

          Nitrofurantoin&Nit. Take 1 capsule 20 capsule 0         2020 Active



          Macrocryst (MACROBID) by mouth 2                                      

   



          100 mg    (two) times                                         



          capsuleIndications: daily for 10                                      

   



          Urinary tract days.                                             



          infection without                                                   



          hematuria, site                                                   



          unspecified                                                   



documented as of this encounter (statuses as of 2020)



Active Problems







                          Problem                   Noted Date

 

                          History of herpes simplex type 2 infection 2020

 

                          Tobacco abuse             2020

 

                          BMI 31.0-31.9,adult       2020

 

                          Glucose tolerance test abnormal 2019

 

                          12 weeks gestation of pregnancy 10/14/2018

 

                          Rubella non-immune status, antepartum 10/10/2018









                                        Overview: 







                                        Address in Postpartum









                          Dyspepsia                 10/03/2018

 

                          Class 1 obesity with body mass index (BMI) of 31.0 to 

31.9 in adult, 2018



                          unspecified obesity type, unspecified whether serious 

comorbidity present 

 

                          UTI (urinary tract infection) during pregnancy 

018









                                        Overview: 







                                        TONIE at next visit -neg









                          Supervision of high risk pregnancy in third trimester 

2018

 

                          Multiparity               2018

 

                          Marijuana use             2018









                                        Overview: 







                                        Reports due to nausea









                          Ovarian cyst              2018









                                        Overview: 







                                        Per outside usg see external provided re

cords









                          Atypical squamous cells of undetermined significance (

ASCUS) on 2018



                          Papanicolaou smear of cervix 









                                        Overview: 







                                        Repeat pap in 12 months









                          History of depression     2018

 

                          History of anxiety        2016

 

                          Nausea and vomiting during pregnancy prior to 22 weeks

 gestation 2016

 

                          Supervision of high risk pregnancy, antepartum 

016









                    Pregnant            Estimated Date of Delivery Comments

 

                    Yes                 2021          Based on last menstr

ual period of 2020



                                                            (Approximate)



documented as of this encounter (statuses as of 2020)



Resolved Problems







                    Problem             Noted Date          Resolved Date

 

                    Hyperemesis         10/06/2018          10/09/2018

 

                    Dehydration         2018          10/07/2018

 

                    11 weeks gestation of pregnancy 2018          10/09/20

18

 

                    Well woman exam     2018

 

                    Contraceptive management 2018

 

                    Nexplanon removal   2018









                                        Overview: 







                                        Per patient reports was only for 2 years









                    History of depression 2016

 

                    UTI in pregnancy, antepartum 2016









                                        Overview: 







                                        Still current uti as of 16









                    BV (bacterial vaginosis) 2016



documented as of this encounter (statuses as of 2020)



Immunizations







                    Name                Administration Dates Next Due

 

                    Influenza Virus Vaccine Quad .5 mL IM 6+ MO 2019, 10/1

2018 

 

                    Influenza Virus Vaccine Quad IM 3+ YRS 2016          

 

                    TDAP                2019          

 

                    TDAP (ADACEL) VACCINE 2016          



documented as of this encounter



Social History







             Tobacco Use  Types        Packs/Day    Years Used   Date

 

             Former Smoker                                        









                Smokeless Tobacco: Never Used                                 









                                        Comments: quit 2020 was smoking 1 

pack per day









                Alcohol Use     Drinks/Week     oz/Week         Comments

 

                No              0 Standard drinks or equivalent 0.0             









                    Pregnant            Estimated Date of Delivery Comments

 

                    Yes                 2021          Based on last menstr

ual period of 2020



                                                            (Approximate)









                          Sex Assigned at Birth     Date Recorded

 

                          Not on file               









                    Job Start Date      Occupation          Industry

 

                    Not on file         Not on file         Not on file









                    Travel History      Travel Start        Travel End









                                        No recent travel history available.









                    COVID-19 Exposure   Response            Date Recorded

 

                    In the last month, have you been in contact with No / Unsure

         2020  9:52 AM 

CDT



                    someone who was confirmed or suspected to have              

       



                    Coronavirus / COVID-19?                     



documented as of this encounter



Last Filed Vital Signs

Not on filedocumented in this encounter



Plan of Treatment







             Date         Type         Specialty    Care Team    Description

 

             2020   Technician Visit Maternal Fetal              



                                       Medicine                  

 

             2020   Routine Prenatal Visit [facility] Shravan Sidhu, 



                                                                FNP



                                        



                                                    [address] [address]

15



                                        



                                                                [phone] (Fax) 









                Health Maintenance Due Date        Last Done       Comments

 

                HPV VACCINES (1 - Female 2006                      



                2-dose series)                                  

 

                INFLUENZA VACCINE (#1) 2020,     



                                                10/18/2018,     



                                                2016      

 

                Depression Screening 2021,     



                                                2020      

 

                PAP SMEAR       2023,     



                                                2018      

 

                DTaP,Tdap,and Td Vaccines (3 2029,     



                - Td)                           2016      

 

                PNEUMOCOCCAL 0-64 YEARS Aged Out                        No longe

r eligible based



                COMBINED SERIES                                 on patient's age

 to



                                                                complete this to

pic



documented as of this encounter



Results

Not on filedocumented in this encounter



Visit Diagnoses







                                        Diagnosis

 

                                        Urinary tract infection without hematuri

a, site unspecified - Primary



documented in this encounter



Insurance







          Payer     Benefit Plan / Subscriber ID Effective Dates Phone     Addre

ss   Type



                    Group                                             

 

          [employer] [place] 2020-Present [phone] [address] 

  Medicaid



                      [place]                                       [id number] [place] [address] 



                                                            [id number] 



documented as of this encounter



Advance Directives







                Name            Relationship    Healthcare Agent Communication



                                                Relationship    

 

                Andrew George Spouse          Primary healthcare agent [phone]



                                                                [phone])[phone] (Home)

 

                Vicky Cartagena  Mother          [facility] [phone]





                                                agent           (Mobile)[phone] (Home)